# Patient Record
Sex: FEMALE | Race: WHITE | NOT HISPANIC OR LATINO | Employment: FULL TIME | URBAN - METROPOLITAN AREA
[De-identification: names, ages, dates, MRNs, and addresses within clinical notes are randomized per-mention and may not be internally consistent; named-entity substitution may affect disease eponyms.]

---

## 2017-11-28 ENCOUNTER — ALLSCRIPTS OFFICE VISIT (OUTPATIENT)
Dept: OTHER | Facility: OTHER | Age: 32
End: 2017-11-28

## 2017-11-28 DIAGNOSIS — Z13.6 ENCOUNTER FOR SCREENING FOR CARDIOVASCULAR DISORDERS: ICD-10-CM

## 2017-11-28 DIAGNOSIS — Z13.29 ENCOUNTER FOR SCREENING FOR OTHER SUSPECTED ENDOCRINE DISORDER: ICD-10-CM

## 2017-11-28 DIAGNOSIS — Z13.228 ENCOUNTER FOR SCREENING FOR OTHER METABOLIC DISORDERS: ICD-10-CM

## 2017-11-28 DIAGNOSIS — Z13.0 ENCOUNTER FOR SCREENING FOR DISEASES OF THE BLOOD AND BLOOD-FORMING ORGANS AND CERTAIN DISORDERS INVOLVING THE IMMUNE MECHANISM: ICD-10-CM

## 2017-11-30 ENCOUNTER — GENERIC CONVERSION - ENCOUNTER (OUTPATIENT)
Dept: OTHER | Facility: OTHER | Age: 32
End: 2017-11-30

## 2017-11-30 ENCOUNTER — LAB CONVERSION - ENCOUNTER (OUTPATIENT)
Dept: OTHER | Facility: OTHER | Age: 32
End: 2017-11-30

## 2017-11-30 LAB
A/G RATIO (HISTORICAL): 1.6 (CALC) (ref 1–2.5)
ALBUMIN SERPL BCP-MCNC: 4.2 G/DL (ref 3.6–5.1)
ALP SERPL-CCNC: 94 U/L (ref 33–115)
ALT SERPL W P-5'-P-CCNC: 16 U/L (ref 6–29)
AST SERPL W P-5'-P-CCNC: 17 U/L (ref 10–30)
BILIRUB SERPL-MCNC: 0.4 MG/DL (ref 0.2–1.2)
BUN SERPL-MCNC: 11 MG/DL (ref 7–25)
BUN/CREA RATIO (HISTORICAL): ABNORMAL (CALC) (ref 6–22)
CALCIUM SERPL-MCNC: 9.1 MG/DL (ref 8.6–10.2)
CHLORIDE SERPL-SCNC: 100 MMOL/L (ref 98–110)
CHOLEST SERPL-MCNC: 202 MG/DL
CHOLEST/HDLC SERPL: 2.9 (CALC)
CO2 SERPL-SCNC: 27 MMOL/L (ref 20–31)
CREAT SERPL-MCNC: 0.76 MG/DL (ref 0.5–1.1)
DEPRECATED RDW RBC AUTO: 13.3 % (ref 11–15)
EGFR AFRICAN AMERICAN (HISTORICAL): 119 ML/MIN/1.73M2
EGFR-AMERICAN CALC (HISTORICAL): 102 ML/MIN/1.73M2
GAMMA GLOBULIN (HISTORICAL): 2.7 G/DL (CALC) (ref 1.9–3.7)
GLUCOSE (HISTORICAL): 94 MG/DL (ref 65–99)
HCT VFR BLD AUTO: 40.3 % (ref 35–45)
HDLC SERPL-MCNC: 70 MG/DL
HGB BLD-MCNC: 12.9 G/DL (ref 11.7–15.5)
LDL CHOLESTEROL (HISTORICAL): 105 MG/DL (CALC)
MCH RBC QN AUTO: 26 PG (ref 27–33)
MCHC RBC AUTO-ENTMCNC: 32 G/DL (ref 32–36)
MCV RBC AUTO: 81.1 FL (ref 80–100)
NON-HDL-CHOL (CHOL-HDL) (HISTORICAL): 132 MG/DL (CALC)
PLATELET # BLD AUTO: 331 THOUSAND/UL (ref 140–400)
PMV BLD AUTO: 10.3 FL (ref 7.5–12.5)
POTASSIUM SERPL-SCNC: 4 MMOL/L (ref 3.5–5.3)
RBC # BLD AUTO: 4.97 MILLION/UL (ref 3.8–5.1)
SODIUM SERPL-SCNC: 134 MMOL/L (ref 135–146)
TOTAL PROTEIN (HISTORICAL): 6.9 G/DL (ref 6.1–8.1)
TRIGL SERPL-MCNC: 154 MG/DL
TSH SERPL DL<=0.05 MIU/L-ACNC: 2.44 MIU/L
WBC # BLD AUTO: 6.6 THOUSAND/UL (ref 3.8–10.8)

## 2018-01-11 NOTE — PROGRESS NOTES
Assessment    1  Encounter for preventive health examination (V70 0) (Z00 00)   2  Obesity (278 00) (E66 9)    Plan  Obesity    · Begin or continue regular aerobic exercise  Gradually work up to at least 3 sessions of 30  minutes of exercise a week ; Status:Complete;   Done: 26EZQ8045   · Guidelines for a vegan diet ; Status:Complete;   Done: 61PHH6602   · How to eat a high-protein vegetarian diet ; Status:Complete;   Done: 97EBV9131   · We recommend that you bring your body mass index down to 26 ; Status:Complete;    Done: 07ONK7479    Discussion/Summary  health maintenance visit healthy adult female Currently, she eats an adequate diet and has an inadequate exercise regimen  cervical cancer screening is current Breast cancer screening: breast cancer screening is not indicated  Colorectal cancer screening: colorectal cancer screening is not indicated  Osteoporosis screening: bone mineral density testing is not indicated  The immunizations are up to date  Advice and education were given regarding nutrition, aerobic exercise and weight loss  Work PE form filled out  Discussed weight loss  Encouraged pt to schedule appointment to discuss further once she is finished with school  Chief Complaint  Pt is here for PE      History of Present Illness  HM, Adult Female: The patient is being seen for a health maintenance evaluation  Social History: Household members include domestic partner  She is unmarried  Work status: working full time and occupation:   General Health: The patient's health since the last visit is described as good  She has regular dental visits  She complains of vision problems  Vision care includes wearing glasses and an eye examination more than a year ago  She denies hearing loss  Immunizations status: up to date  Lifestyle:  She consumes a diverse and healthy diet  She has weight concerns  She does not exercise regularly   She does not use tobacco  She consumes alcohol  She reports occasional alcohol use  She denies drug use  Reproductive health: the patient is premenopausal    Screening: Cervical cancer screening includes a pap smear performed last year  Breast cancer screening includes no previous clinical breast exam  She hasn't been previously screened for colorectal cancer  Metabolic screening includes lipid profile performed 2015, glucose screening performed 2015 and thyroid function test performed 2015  Cardiovascular risk factors: stress, obesity and sedentary lifestyle  Risk findings: anxiety symptoms and depression symptoms, but no travel to developing areas and no tuberculosis exposure  HPI: Has difficulty losing weight  Has been to nutritionist w/o effect  Keeps track of dietary intake on phone  Does not exercise  Works full time as  and in school for [employer]  Done in May  Under a lot of stress  Does tend to stress eat  Mom with obesity  Review of Systems    Constitutional: no fever, not feeling poorly, no chills and not feeling tired  Eyes: No complaints of eye pain, no red eyes, no eyesight problems, no discharge, no dry eyes, no itching of eyes  ENT: no complaints of earache, no loss of hearing, no nose bleeds, no nasal discharge, no sore throat, no hoarseness  Cardiovascular: no chest pain, no palpitations and no lower extremity edema  Respiratory: no shortness of breath, no cough and no wheezing  Gastrointestinal: No complaints of abdominal pain, no constipation, no nausea or vomiting, no diarrhea, no bloody stools  Genitourinary: No complaints of dysuria, no incontinence, no pelvic pain, no dysmenorrhea, no vaginal discharge or bleeding  Musculoskeletal: No complaints of arthralgias, no myalgias, no joint swelling or stiffness, no limb pain or swelling  Integumentary: no rashes and no skin lesions  Neurological: no headache, no numbness, no tingling and no dizziness     Psychiatric: anxiety and depression, but no sleep disturbances  Hematologic/Lymphatic: No complaints of swollen glands, no swollen glands in the neck, does not bleed easily, does not bruise easily  Active Problems    1  Allergic rhinitis (477 9) (J30 9)   2  Chronic pain syndrome (338 4) (G89 4)   3  Depression (311) (F32 9)   4  Elevated blood pressure reading without diagnosis of hypertension (796 2) (R03 0)   5  Exercise-induced asthma (493 81) (J45 990)   6  Flu vaccine need (V04 81) (Z23)   7  Generalized anxiety disorder (300 02) (F41 1)   8  Limb pain (729 5) (M79 609)   9  Lower back pain (724 2) (M54 5)   10  Lumbar radiculopathy (724 4) (M54 16)   11  Lumbosacral spondylosis without myelopathy (721 3) (M47 817)   12  Obesity (278 00) (E66 9)    Past Medical History    · History of Facial numbness (782 0) (R20 0)   · History of Herniated lumbar intervertebral disc (722 10) (M51 26)   · History of upper respiratory infection (V12 09) (Z87 09)   · History of URTI (acute upper respiratory infection) (465 9) (J06 9)    Surgical History    · History of Oral Surgery Tooth Extraction   · History of Right Breast Lumpectomy    Family History  Family History    · Family history of Ovarian Cancer (V16 41)    Social History    · Being A Social Drinker   · Never A Smoker    Current Meds   1  Allegra Allergy 180 MG Oral Tablet; take 1 tablet daily as needed; Therapy: 21MRC2100 to Recorded   2  B-12 1000 MCG Oral Capsule; 1 cap a day; Therapy: 99WZA2495 to Recorded   3  Fish Oil 1200 MG Oral Capsule; one tab dialy; Therapy: 68QRF9445 to Recorded   4  Montelukast Sodium 10 MG Oral Tablet; TAKE ONE (1) TABLET(S) DAILY; Therapy: 93REB5075 to (Hettie Coil)  Requested for: 61Dbn9560; Last   Rx:88Ocf4574 Ordered   5  NuvaRing 0 12-0 015 MG/24HR Vaginal Ring; Therapy: 53GBR7050 to Recorded   6  AJ-e 200 MG Oral Tablet; TAKE AS DIRECTED; Therapy: 89LCJ0224 to Recorded   7   Triamcinolone Acetonide 55 MCG/ACT Nasal Aerosol; 2 SPRAYS EACH NOSTRIL   ONCE DAILY; Therapy: 54IZO0675 to Recorded   8  Ventolin  (90 Base) MCG/ACT Inhalation Aerosol Solution; INHALE 1 TO 2   PUFFS EVERY 4 TO 6 HOURS AS NEEDED; Therapy: 32HFJ6278 to Recorded    Allergies    1  Anesthesia IV Set MISC   2  Opioid Analgesics   3  Percocet TABS    Vitals   Recorded: 29Nov2016 10:09AM Recorded: 93RCZ5370 09:47AM   Temperature  98 3 F, Tympanic   Heart Rate  96, L Radial   Pulse Quality  Regular   Systolic 261 352, LUE, Sitting   Diastolic 88 90, LUE, Sitting   Height  5 ft 6 in   Weight  228 lb    BMI Calculated  36 8   BSA Calculated  2 12     Physical Exam    Constitutional   General appearance: No acute distress, well appearing and well nourished  obese  Head and Face   Head and face: Normal     Eyes   Conjunctiva and lids: No swelling, erythema or discharge  Pupils and irises: Equal, round, reactive to light  Ears, Nose, Mouth, and Throat   External inspection of ears and nose: Normal     Otoscopic examination: Tympanic membranes translucent with normal light reflex  Canals patent without erythema  Lips, teeth, and gums: Normal, good dentition  Oropharynx: Normal with no erythema, edema, exudate or lesions  Neck   Neck: Supple, symmetric, trachea midline, no masses  Thyroid: Normal, no thyromegaly  Pulmonary   Respiratory effort: No increased work of breathing or signs of respiratory distress  Auscultation of lungs: Clear to auscultation  Cardiovascular   Auscultation of heart: Normal rate and rhythm, normal S1 and S2, no murmurs  Examination of extremities for edema and/or varicosities: Normal     Abdomen   Abdomen: Non-tender, no masses  Liver and spleen: No hepatomegaly or splenomegaly  Lymphatic   Palpation of lymph nodes in neck: No lymphadenopathy  Musculoskeletal   Gait and station: Normal     Muscle strength/tone: Normal     Skin   Skin and subcutaneous tissue: Normal without rashes or lesions  Palpation of skin and subcutaneous tissue: Normal turgor  Neurologic   Reflexes: 2+ and symmetric  Psychiatric   Orientation to person, place, and time: Normal     Mood and affect: Normal        Results/Data  PHQ-9 Adult Depression Screening 29Nov2016 09:42AM User, s     Test Name Result Flag Reference   PHQ-9 Adult Depression Score 3     Over the last two weeks, how often have you been bothered by any of the following problems? Little interest or pleasure in doing things: Not at all - 0  Feeling down, depressed, or hopeless: Several days - 1  Trouble falling or staying asleep, or sleeping too much: Several days - 1  Feeling tired or having little energy: Not at all - 0  Poor appetite or over eating: Several days - 1  Feeling bad about yourself - or that you are a failure or have let yourself or your family down: Not at all - 0  Trouble concentrating on things, such as reading the newspaper or watching television: Not at all - 0  Moving or speaking so slowly that other people could have noticed   Or the opposite -  being so fidgety or restless that you have been moving around a lot more than usual: Not at all - 0  Thoughts that you would be better off dead, or of hurting yourself in some way: Not at all - 0   PHQ-9 Adult Depression Screening Negative     PHQ-9 Difficulty Level Not difficult at all     PHQ-9 Severity Minimal Depression         Signatures   Electronically signed by : Wily Onofre; Nov 29 2016 10:18AM EST                       (Author)    Electronically signed by : Papo Cho DO; Nov 29 2016 10:29AM EST                       (Author)

## 2018-01-11 NOTE — RESULT NOTES
Discussion/Summary    Please call pt and let her know I completed her form and will be faxing  She can  the original    Her blood work was normal except her triglycerides were slightly elevated  I recommend reducing carbs and sugar in diet  Will recheck in 1 year  Will give her slips at her next PE in 1 year  Pt aware         Verified Results  (1) LIPID PANEL, FASTING [id number] 07:23AM Yael Burnham     Test Name Result Flag Reference   CHOLESTEROL, TOTAL 202 mg/dL H <200   HDL CHOLESTEROL 70 mg/dL  >28   TRIGLICERIDES 981 mg/dL H <150   LDL-CHOLESTEROL 105 mg/dL (calc) H    Reference range: <100     Desirable range <100 mg/dL for patients with CHD or  diabetes and <70 mg/dL for diabetic patients with  known heart disease  LDL-C is now calculated using the Lionel-Ley   calculation, which is a validated novel method providing   better accuracy than the Friedewald equation in the   estimation of LDL-C  Kenny Ac  [place] [address];464(01): [phone]   (http://[employer]/faq/VMD015)   CHOL/HDLC RATIO 2 9 (calc)  <5 0   NON HDL CHOLESTEROL 132 mg/dL (calc) H <130   For patients with diabetes plus 1 major ASCVD risk   factor, treating to a non-HDL-C goal of <100 mg/dL   (LDL-C of <70 mg/dL) is considered a therapeutic   option  (1) COMPREHENSIVE METABOLIC PANEL 16KDV5349 14:06QX Ana Rosa Carlson     Test Name Result Flag Reference   GLUCOSE 94 mg/dL  65-99   Fasting reference interval   UREA NITROGEN (BUN) 11 mg/dL  7-25   CREATININE 0 76 mg/dL  0 50-1 10   eGFR NON-AFR   AMERICAN 102 mL/min/1 73m2  > OR = 60   eGFR AFRICAN AMERICAN 119 mL/min/1 73m2  > OR = 60   BUN/CREATININE RATIO   7-14   NOT APPLICABLE (calc)   SODIUM 134 mmol/L L 135-146   POTASSIUM 4 0 mmol/L  3 5-5 3   CHLORIDE 100 mmol/L     CARBON DIOXIDE 27 mmol/L  20-31   CALCIUM 9 1 mg/dL  8 6-10 2   PROTEIN, TOTAL 6 9 g/dL  6 1-8 1   ALBUMIN 4 2 g/dL  3 6-5 1   GLOBULIN 2 7 g/dL (calc)  1 9-3 7   ALBUMIN/GLOBULIN RATIO 1 6 (calc)  1 0-2 5   BILIRUBIN, TOTAL 0 4 mg/dL  0 2-1 2   ALKALINE PHOSPHATASE 94 U/L     AST 17 U/L  10-30   ALT 16 U/L  6-29     (Q) CBC (H/H, RBC, INDICES, WBC, PLT) [id number] 07:23AM Yael Burnham     Test Name Result Flag Reference   WHITE BLOOD CELL COUNT 6 6 Thousand/uL  3 8-10 8   RED BLOOD CELL COUNT 4 97 Million/uL  3 80-5 10   HEMOGLOBIN 12 9 g/dL  11 7-15 5   HEMATOCRIT 40 3 %  35 0-45 0   MCV 81 1 fL  80 0-100 0   MCH 26 0 pg L 27 0-33 0   MCHC 32 0 g/dL  32 0-36 0   RDW 13 3 %  11 0-15 0   PLATELET COUNT 121 Thousand/uL  140-400   MPV 10 3 fL  7 5-12 5     (Q) TSH, 3RD GENERATION W/REFLEX TO FT4 [id number] 07:23AM Durga Epstein   REPORT COMMENT:  FASTING:YES     Test Name Result Flag Reference   TSH W/REFLEX TO FT4 2 44 mIU/L     Reference Range                         > or = 20 Years  0 40-4 50                              Pregnancy Ranges            First trimester    0 26-2 66            Second trimester   0 55-2 73            Third trimester    0 43-2 91

## 2018-01-16 NOTE — PROGRESS NOTES
Assessment    1  Encounter for preventive health examination (V70 0) (Z00 00)   2  Obesity (278 00) (E66 9)   3  Exercise-induced asthma (493 81) (J45 990)    Plan  Exercise-induced asthma    · Ventolin  (90 Base) MCG/ACT Inhalation Aerosol Solution; INHALE 1 TO  2 PUFFS EVERY 4 TO 6 HOURS AS NEEDED  Health Maintenance    · Follow-up visit in 1 year Evaluation and Treatment  Follow-up  Status: Hold For -  Scheduling  Requested for: 11AKY2607   · Brush your teeth 3 times a day and floss at least once a day ; Status:Complete;   Done:  51OAV3743   · Regular aerobic exercise can help reduce stress ; Status:Complete;   Done: 48MXR0483   · Use a sun block product with an SPF of 15 or more ; Status:Complete;   Done:  46YCY9462   · Vitamins can help you get daily requirements that your diet may not be giving you ;  Status:Complete;   Done: 68EQS3000   · We recommend routine visits to a dentist ; Status:Complete;   Done: 44ONV4032   · Call [phone] if: You find a new or different kind of lump in your breast ;  Status:Complete;   Done: 20XQX2049   · Call [phone] if: You have any warning signs of skin cancer ; Status:Complete;    Done: 99QOT5555   · Call [phone] if: You have symptoms of toxic shock ; Status:Complete;   Done: 59MZS1533  Obesity    · Avoid alcoholic beverages ; Status:Complete;   Done: 92MJS7158   · Eat a low fat and low cholesterol diet ; Status:Complete;   Done: 60AEI2802   · How to eat a high-protein vegetarian diet ; Status:Complete;   Done: 85NVG0037   · Some eating tips that can help you lose weight ; Status:Complete;   Done: 56HKX3751   · We recommend that you bring your body mass index down to 26 ; Status:Complete;    Done: 83OYF7902  Screening for cardiovascular condition    · (1) LIPID PANEL, FASTING; Status:Active; Requested for:28Nov2017;   Screening for endocrine/metabolic/immunity disorders    · (1) CBC/ PLT (NO DIFF); Status:Active;  Requested for:28Nov2017;    · (1) COMPREHENSIVE METABOLIC PANEL; Status:Active; Requested for:28Nov2017;    · (1) TSH WITH FT4 REFLEX; Status:Active; Requested for:28Nov2017;     Discussion/Summary  health maintenance visit healthy adult female Currently, she eats an adequate diet and has an adequate exercise regimen  cervical cancer screening is current Breast cancer screening: breast cancer screening is not indicated  Colorectal cancer screening: colorectal cancer screening is not indicated  Osteoporosis screening: bone mineral density testing is not indicated  Screening lab work includes hemoglobin, glucose, lipid profile and thyroid function testing  The immunizations are up to date and had flu shot at work  Advice and education were given regarding nutrition, aerobic exercise and weight loss  Patient discussion: discussed with the patient  EIA well controlled  Refill for Ventolin inhaler sent  Difficulty losing weight  Will check TSH  Will fill out work PE form once lab results available  The treatment plan was reviewed with the patient/guardian  The patient/guardian understands and agrees with the treatment plan      Chief Complaint  Pt is here for PE      History of Present Illness  HM, Adult Female: The patient is being seen for a health maintenance evaluation  The last health maintenance visit was 1 year(s) ago  General Health: The patient's health since the last visit is described as good  She has regular dental visits  She complains of vision problems  Vision care includes wearing glasses and an eye examination within the last year  She denies hearing loss  Immunizations status: up to date  Lifestyle:  She consumes a diverse and healthy diet  She has weight concerns  She exercises regularly  She does not use tobacco  She consumes alcohol  She reports occasional alcohol use  She denies drug use  Reproductive health: the patient is premenopausal    Screening: Cervical cancer screening includes a pap smear performed 2014   Breast cancer screening includes a mammogram performed 1997  She hasn't been previously screened for colorectal cancer  Metabolic screening includes lipid profile performed 2015, glucose screening performed 2015 and thyroid function test performed 2015  Cardiovascular risk factors: obesity  Risk findings: no anxiety symptoms, no depression symptoms, no travel to developing areas and no tuberculosis exposure  HPI: Keeps gaining weight  Runs 10-15 miles/week  Lifts weight  Does couch to 5K  Did Weight Watchers and it did not work for her  Tends to carry water weight  Watches sodium  Vegetarian  Exercise induced asthma  using Ventolin inhaler prior to running  No need outside of that  States depression and anxiety are so much better  has not needed meds for years  Review of Systems    Constitutional: recent weight gain, but no fever, not feeling poorly, no chills and not feeling tired  Eyes: No complaints of eye pain, no red eyes, no eyesight problems, no discharge, no dry eyes, no itching of eyes  ENT: no complaints of earache, no loss of hearing, no nose bleeds, no nasal discharge, no sore throat, no hoarseness  Cardiovascular: no chest pain, no palpitations and no lower extremity edema  Respiratory: no shortness of breath, no cough and no wheezing  Gastrointestinal: No complaints of abdominal pain, no constipation, no nausea or vomiting, no diarrhea, no bloody stools  Genitourinary: No complaints of dysuria, no incontinence, no pelvic pain, no dysmenorrhea, no vaginal discharge or bleeding  Musculoskeletal: No complaints of arthralgias, no myalgias, no joint swelling or stiffness, no limb pain or swelling  Integumentary: no rashes and no skin lesions  Neurological: no headache, no numbness, no tingling, no dizziness, no limb weakness and no difficulty walking  Psychiatric: no anxiety, no sleep disturbances and no depression     Hematologic/Lymphatic: No complaints of swollen glands, no swollen glands in the neck, does not bleed easily, does not bruise easily  Active Problems    1  Allergic rhinitis (477 9) (J30 9)   2  Exercise-induced asthma (493 81) (J45 990)   3  Flu vaccine need (V04 81) (Z23)   4  Obesity (278 00) (E66 9)    Past Medical History    · History of Chronic pain syndrome (338 4) (G89 4)   · History of Elevated blood pressure reading without diagnosis of hypertension (796 2)  (R03 0)   · History of Facial numbness (782 0) (R20 0)   · History of Generalized anxiety disorder (300 02) (F41 1)   · History of Herniated lumbar intervertebral disc (722 10) (M51 26)   · History of depression (V11 8) (Z86 59)   · History of low back pain (V13 59) (Z87 39)   · History of upper respiratory infection (V12 09) (Z87 09)   · History of Limb pain (729 5) (M79 609)   · History of Lumbar radiculopathy (724 4) (M54 16)   · History of Lumbosacral spondylosis without myelopathy (721 3) (M47 817)   · History of URTI (acute upper respiratory infection) (465 9) (J06 9)    Surgical History    · History of Oral Surgery Tooth Extraction   · History of Right Breast Lumpectomy    Family History  Family History    · Family history of Ovarian Cancer (V16 41)    Social History    · Being A Social Drinker   · Never A Smoker    Current Meds   1  Allegra Allergy 180 MG Oral Tablet; take 1 tablet daily as needed; Therapy: 93XEC5422 to Recorded   2  B-12 1000 MCG Oral Capsule; 1 cap a day; Therapy: 83YDS8298 to Recorded   3  Fish Oil 1200 MG Oral Capsule; one tab dialy; Therapy: 37KXB3532 to Recorded   4  Triamcinolone Acetonide 55 MCG/ACT Nasal Aerosol; 2 SPRAYS EACH NOSTRIL   ONCE DAILY; Therapy: 95NZT0725 to Recorded   5  Ventolin  (90 Base) MCG/ACT Inhalation Aerosol Solution; INHALE 1 TO 2   PUFFS EVERY 4 TO 6 HOURS AS NEEDED; Therapy: 91LKN3127 to Recorded    Allergies    1  Anesthesia IV Set MISC   2  Opioid Analgesics   3   Percocet TABS    Vitals   Recorded: [id number] 08:02AM Temperature 97 9 F, Tympanic   Heart Rate 88, L Radial   Pulse Quality Regular, L Radial   Systolic 948, LUE, Sitting   Diastolic 82, LUE, Sitting   Height 5 ft 6 in   Weight 234 lb 6 4 oz   BMI Calculated 37 83   BSA Calculated 2 14     Physical Exam    Constitutional   General appearance: Abnormal   obese  Head and Face   Head and face: Normal     Eyes   Conjunctiva and lids: No swelling, erythema or discharge  Pupils and irises: Equal, round, reactive to light  Ears, Nose, Mouth, and Throat   External inspection of ears and nose: Normal     Otoscopic examination: Tympanic membranes translucent with normal light reflex  Canals patent without erythema  Lips, teeth, and gums: Normal, good dentition  Oropharynx: Normal with no erythema, edema, exudate or lesions  Neck   Neck: Supple, symmetric, trachea midline, no masses  Thyroid: Normal, no thyromegaly  Pulmonary   Respiratory effort: No increased work of breathing or signs of respiratory distress  Auscultation of lungs: Clear to auscultation  Cardiovascular   Auscultation of heart: Normal rate and rhythm, normal S1 and S2, no murmurs  Carotid pulses: 2+ bilaterally  no bruit heard over the right carotid and no bruit heard over the left carotid  Examination of extremities for edema and/or varicosities: Normal     Abdomen   Abdomen: Non-tender, no masses  Liver and spleen: No hepatomegaly or splenomegaly  Lymphatic   Palpation of lymph nodes in neck: No lymphadenopathy  Musculoskeletal   Gait and station: Normal     Muscle strength/tone: Normal     Skin   Skin and subcutaneous tissue: Normal without rashes or lesions  Palpation of skin and subcutaneous tissue: Normal turgor  Neurologic   Cranial nerves: Cranial nerves II-XII intact  Reflexes: 2+ and symmetric      Psychiatric   Orientation to person, place, and time: Normal     Mood and affect: Normal        Results/Data  PHQ-9 Adult Depression Screening [id number] 08: 15AM User, [facility]     Test Name Result Flag Reference   PHQ-9 Adult Depression Score 0     Over the last two weeks, how often have you been bothered by any of the following problems? Little interest or pleasure in doing things: Not at all - 0  Feeling down, depressed, or hopeless: Not at all - 0  Trouble falling or staying asleep, or sleeping too much: Not at all - 0  Feeling tired or having little energy: Not at all - 0  Poor appetite or over eating: Not at all - 0  Feeling bad about yourself - or that you are a failure or have let yourself or your family down: Not at all - 0  Trouble concentrating on things, such as reading the newspaper or watching television: Not at all - 0  Moving or speaking so slowly that other people could have noticed   Or the opposite -  being so fidgety or restless that you have been moving around a lot more than usual: Not at all - 0  Thoughts that you would be better off dead, or of hurting yourself in some way: Not at all - 0   PHQ-9 Adult Depression Screening Negative     PHQ-9 Difficulty Level Not difficult at all     PHQ-9 Severity No Depression         Signatures   Electronically signed by : David Corado; Nov 28 2017  8:46AM EST                       (Author)    Electronically signed by : Shirley Locke DO; Nov 28 2017  8:51AM EST                       (Author)

## 2018-01-22 VITALS
DIASTOLIC BLOOD PRESSURE: 82 MMHG | WEIGHT: 234.4 LBS | HEIGHT: 66 IN | HEART RATE: 88 BPM | TEMPERATURE: 97.9 F | SYSTOLIC BLOOD PRESSURE: 132 MMHG | BODY MASS INDEX: 37.67 KG/M2

## 2018-04-10 ENCOUNTER — TELEPHONE (OUTPATIENT)
Dept: FAMILY MEDICINE CLINIC | Facility: HOSPITAL | Age: 33
End: 2018-04-10

## 2018-04-10 RX ORDER — PYRIDOXINE HCL (VITAMIN B6) 50 MG
TABLET ORAL
COMMUNITY
Start: 2013-11-25 | End: 2018-04-11 | Stop reason: SDUPTHER

## 2018-04-10 RX ORDER — ALBUTEROL SULFATE 90 UG/1
1-2 AEROSOL, METERED RESPIRATORY (INHALATION)
COMMUNITY
Start: 2013-11-25 | End: 2018-11-06 | Stop reason: SDUPTHER

## 2018-04-10 RX ORDER — TRIAMCINOLONE ACETONIDE 55 UG/1
2 SPRAY, METERED NASAL DAILY
COMMUNITY
Start: 2015-09-08 | End: 2018-05-15 | Stop reason: ALTCHOICE

## 2018-04-10 RX ORDER — AMOXICILLIN 500 MG
CAPSULE ORAL
COMMUNITY
Start: 2013-11-25 | End: 2018-05-15 | Stop reason: ALTCHOICE

## 2018-04-10 RX ORDER — FEXOFENADINE HCL 180 MG/1
1 TABLET ORAL DAILY PRN
COMMUNITY
Start: 2015-06-04 | End: 2018-05-15 | Stop reason: ALTCHOICE

## 2018-04-10 NOTE — TELEPHONE ENCOUNTER
PATIENT LEFT MESSAGE ON OUR VOICEMAIL - SHE IS PREGNANT BUT WANTS TO SEE A PSYCH DR BECAUSE SHE IS DEPRESSED - CAN NOT GET IN TO SEE PSYCH DR FOR A FEW MONTHS AND WAS HOPING THAT WE COULD PRESCRIBE AN ANTIDEPRESSANT FOR HER IN THE MEANTIME - PCB

## 2018-04-11 ENCOUNTER — OFFICE VISIT (OUTPATIENT)
Dept: FAMILY MEDICINE CLINIC | Facility: HOSPITAL | Age: 33
End: 2018-04-11
Payer: COMMERCIAL

## 2018-04-11 VITALS
TEMPERATURE: 98.6 F | WEIGHT: 235.8 LBS | BODY MASS INDEX: 37.9 KG/M2 | DIASTOLIC BLOOD PRESSURE: 80 MMHG | HEIGHT: 66 IN | SYSTOLIC BLOOD PRESSURE: 136 MMHG | HEART RATE: 94 BPM

## 2018-04-11 DIAGNOSIS — F33.0 MILD EPISODE OF RECURRENT MAJOR DEPRESSIVE DISORDER (HCC): Primary | ICD-10-CM

## 2018-04-11 DIAGNOSIS — Z3A.01 LESS THAN 8 WEEKS GESTATION OF PREGNANCY: ICD-10-CM

## 2018-04-11 PROCEDURE — 99214 OFFICE O/P EST MOD 30 MIN: CPT | Performed by: NURSE PRACTITIONER

## 2018-04-11 RX ORDER — LORATADINE 10 MG/1
CAPSULE, LIQUID FILLED ORAL
COMMUNITY
Start: 2018-03-19 | End: 2020-03-27

## 2018-04-11 RX ORDER — CHOLECALCIFEROL (VITAMIN D3) 25 MCG
TABLET,CHEWABLE ORAL
COMMUNITY
Start: 2018-01-03 | End: 2022-07-08

## 2018-04-11 RX ORDER — SERTRALINE HYDROCHLORIDE 25 MG/1
TABLET, FILM COATED ORAL
Qty: 30 TABLET | Refills: 1 | Status: SHIPPED | OUTPATIENT
Start: 2018-04-11 | End: 2018-05-15 | Stop reason: SDUPTHER

## 2018-04-11 NOTE — ASSESSMENT & PLAN NOTE
Recurrent depression complicated by pregnancy  Discussed risk/benefit to SSRI use in pregnancy  Agreeable starting medication outweighs risks  Will start on sertraline  Advise she discuss with OB before starting  Continue with therapy  F/U in 4 weeks  Call with any worsening mood

## 2018-04-11 NOTE — PROGRESS NOTES
Assessment/Plan:    Mild episode of recurrent major depressive disorder (HCC)  Recurrent depression complicated by pregnancy  Discussed risk/benefit to SSRI use in pregnancy  Agreeable starting medication outweighs risks  Will start on sertraline  Advise she discuss with OB before starting  Continue with therapy  F/U in 4 weeks  Call with any worsening mood  Diagnoses and all orders for this visit:    Mild episode of recurrent major depressive disorder (HCC)  -     sertraline (ZOLOFT) 25 mg tablet; Take 1/2 tab daily for 1 week then increase to 1 tablet daily  Less than 8 weeks gestation of pregnancy    Other orders  -     Loratadine (CLARITIN) 10 MG CAPS;   -     Fluticasone Propionate (FLONASE ALLERGY RELIEF NA);   -     Prenatal Vit-Fe Fum-FA-Omega (ONE-A-DAY WOMENS PRENATAL PO);   -     Cyanocobalamin (B-12) 1000 MCG CAPS;           Subjective:      Patient ID: Emily Limon is a 27 y o  female  5 weeks pregnant  Feeling depressed for last 4 months  Was taking AJ-E supplement but stopped when she found out she was pregnant  Meloncholic-very sad  Has some anxiety  Was doing well with supplement  Lack of motivation  Negative self talk  Going to therapy  Using coping skills  Has been on both sertraline and Lexapro in the past  States both were effective but caused SI after being on for 1 year  Had also been on wellbutrin with good effect but made her hyped up initially  Currently denies any Si  Pregnancy was planned  States she did not want children but  does  States she compromised with him as he will be main caretaker once baby born  The following portions of the patient's history were reviewed and updated as appropriate: allergies, current medications, past family history, past medical history, past social history, past surgical history and problem list     Review of Systems   Constitutional: Negative for unexpected weight change     Psychiatric/Behavioral: Positive for dysphoric mood and sleep disturbance  Negative for suicidal ideas  The patient is nervous/anxious  Objective:  Vitals:    04/11/18 0742   BP: 136/80   Pulse: 94   Temp: 98 6 °F (37 °C)      Physical Exam   Constitutional: She is oriented to person, place, and time  She appears well-developed and well-nourished  Neurological: She is alert and oriented to person, place, and time  Skin: Skin is warm and dry  Psychiatric: She has a normal mood and affect   Her behavior is normal  Judgment and thought content normal

## 2018-05-15 ENCOUNTER — OFFICE VISIT (OUTPATIENT)
Dept: FAMILY MEDICINE CLINIC | Facility: HOSPITAL | Age: 33
End: 2018-05-15
Payer: COMMERCIAL

## 2018-05-15 VITALS
SYSTOLIC BLOOD PRESSURE: 126 MMHG | TEMPERATURE: 98.5 F | WEIGHT: 236.2 LBS | BODY MASS INDEX: 37.96 KG/M2 | HEART RATE: 90 BPM | HEIGHT: 66 IN | DIASTOLIC BLOOD PRESSURE: 84 MMHG

## 2018-05-15 DIAGNOSIS — F33.0 MILD EPISODE OF RECURRENT MAJOR DEPRESSIVE DISORDER (HCC): Primary | ICD-10-CM

## 2018-05-15 PROCEDURE — 99213 OFFICE O/P EST LOW 20 MIN: CPT | Performed by: NURSE PRACTITIONER

## 2018-05-15 RX ORDER — SERTRALINE HYDROCHLORIDE 25 MG/1
TABLET, FILM COATED ORAL
Qty: 30 TABLET | Refills: 1 | Status: SHIPPED | OUTPATIENT
Start: 2018-05-15 | End: 2018-07-17 | Stop reason: SDUPTHER

## 2018-05-15 NOTE — ASSESSMENT & PLAN NOTE
Well controlled  Continue with therapy  Good use of coping skills  Will continue on current regimen  F/U in 2 months

## 2018-05-15 NOTE — PROGRESS NOTES
Assessment/Plan:    Mild episode of recurrent major depressive disorder ([address] )  Well controlled  Continue with therapy  Good use of coping skills  Will continue on current regimen  F/U in 2 months  Diagnoses and all orders for this visit:    Mild episode of recurrent major depressive disorder (HCC)  -     sertraline (ZOLOFT) 25 mg tablet; Take 1/2 tab daily for 1 week then increase to 1 tablet daily  Subjective:      Patient ID: Hollie Irwin is a 27 y o  female  Mood and affect are better  Has been using coping skills  In therapy  No anxiety  Not sleeping but feels it is hormonal but getting better  Denies AE  Considering coming off sertraline for third trimester  Plans on utilizing coping skills and therpy to fullest extent in order to achieve this  The following portions of the patient's history were reviewed and updated as appropriate: allergies, current medications, past family history, past medical history, past social history, past surgical history and problem list     Review of Systems   Constitutional: Negative for activity change, fatigue and unexpected weight change  Gastrointestinal: Positive for vomiting  Negative for abdominal pain and nausea  Neurological: Negative for headaches  Psychiatric/Behavioral: Positive for sleep disturbance  Negative for agitation, decreased concentration, dysphoric mood and suicidal ideas  The patient is not nervous/anxious and is not hyperactive  Objective:  Vitals:    05/15/18 1709   BP: 126/84   Pulse: 90   Temp: 98 5 °F (36 9 °C)      Physical Exam   Constitutional: She is oriented to person, place, and time  She appears well-developed and well-nourished  Neurological: She is alert and oriented to person, place, and time  Skin: Skin is warm and dry  Psychiatric: She has a normal mood and affect   Her behavior is normal  Judgment and thought content normal

## 2018-07-17 ENCOUNTER — OFFICE VISIT (OUTPATIENT)
Dept: FAMILY MEDICINE CLINIC | Facility: HOSPITAL | Age: 33
End: 2018-07-17
Payer: COMMERCIAL

## 2018-07-17 VITALS
HEIGHT: 66 IN | TEMPERATURE: 97.9 F | SYSTOLIC BLOOD PRESSURE: 132 MMHG | WEIGHT: 244.4 LBS | DIASTOLIC BLOOD PRESSURE: 80 MMHG | BODY MASS INDEX: 39.28 KG/M2 | HEART RATE: 86 BPM

## 2018-07-17 DIAGNOSIS — F33.0 MILD EPISODE OF RECURRENT MAJOR DEPRESSIVE DISORDER (HCC): Primary | ICD-10-CM

## 2018-07-17 DIAGNOSIS — Z3A.19 19 WEEKS GESTATION OF PREGNANCY: ICD-10-CM

## 2018-07-17 PROCEDURE — 99213 OFFICE O/P EST LOW 20 MIN: CPT | Performed by: NURSE PRACTITIONER

## 2018-07-17 RX ORDER — SERTRALINE HYDROCHLORIDE 25 MG/1
TABLET, FILM COATED ORAL
Qty: 30 TABLET | Refills: 3 | Status: SHIPPED | OUTPATIENT
Start: 2018-07-17 | End: 2018-09-11 | Stop reason: SDUPTHER

## 2018-07-17 NOTE — PROGRESS NOTES
Assessment/Plan:    Mild episode of recurrent major depressive disorder ([address] )  Well controlled depression  Continue on current sertraline dose  Still planning on weaning in third trimester  F/U in 3 months and can discuss wean at that time  Diagnoses and all orders for this visit:    Mild episode of recurrent major depressive disorder (HCC)  -     sertraline (ZOLOFT) 25 mg tablet; Take 1/2 tab daily for 1 week then increase to 1 tablet daily  19 weeks gestation of pregnancy          Subjective:      Patient ID: Jose L Gonzalez is a 27 y o  female  Mood is good  19 weeks pregnant  Feels calm  More productive  Positive thinking  Denies depressive and anxiety symptoms  No AE from sertraline  Still plans on weaning in third trimester  Sleeping better  The following portions of the patient's history were reviewed and updated as appropriate: allergies, current medications, past family history, past medical history, past social history, past surgical history and problem list     Review of Systems   Constitutional: Negative for fatigue and unexpected weight change  Psychiatric/Behavioral: Negative for dysphoric mood, self-injury, sleep disturbance and suicidal ideas  The patient is not nervous/anxious  Objective:  Vitals:    07/17/18 1752   BP: 132/80   Pulse: 86   Temp: 97 9 °F (36 6 °C)      Physical Exam   Constitutional: She is oriented to person, place, and time  She appears well-developed and well-nourished  Neurological: She is alert and oriented to person, place, and time  Psychiatric: She has a normal mood and affect   Her behavior is normal  Judgment and thought content normal

## 2018-07-17 NOTE — ASSESSMENT & PLAN NOTE
Well controlled depression  Continue on current sertraline dose  Still planning on weaning in third trimester  F/U in 3 months and can discuss wean at that time

## 2018-08-13 DIAGNOSIS — F33.0 MILD EPISODE OF RECURRENT MAJOR DEPRESSIVE DISORDER (HCC): ICD-10-CM

## 2018-08-13 RX ORDER — SERTRALINE HYDROCHLORIDE 25 MG/1
25 TABLET, FILM COATED ORAL DAILY
Qty: 30 TABLET | Refills: 2 | Status: SHIPPED | OUTPATIENT
Start: 2018-08-13 | End: 2018-09-11 | Stop reason: SDUPTHER

## 2018-09-11 ENCOUNTER — OFFICE VISIT (OUTPATIENT)
Dept: FAMILY MEDICINE CLINIC | Facility: HOSPITAL | Age: 33
End: 2018-09-11
Payer: COMMERCIAL

## 2018-09-11 VITALS
HEART RATE: 92 BPM | BODY MASS INDEX: 40.47 KG/M2 | HEIGHT: 66 IN | WEIGHT: 251.8 LBS | SYSTOLIC BLOOD PRESSURE: 128 MMHG | TEMPERATURE: 97.9 F | DIASTOLIC BLOOD PRESSURE: 80 MMHG

## 2018-09-11 DIAGNOSIS — Z3A.28 28 WEEKS GESTATION OF PREGNANCY: Primary | ICD-10-CM

## 2018-09-11 DIAGNOSIS — F33.0 MILD EPISODE OF RECURRENT MAJOR DEPRESSIVE DISORDER (HCC): ICD-10-CM

## 2018-09-11 PROCEDURE — 99213 OFFICE O/P EST LOW 20 MIN: CPT | Performed by: NURSE PRACTITIONER

## 2018-09-11 RX ORDER — SERTRALINE HYDROCHLORIDE 25 MG/1
TABLET, FILM COATED ORAL
Qty: 30 TABLET | Refills: 0 | Status: SHIPPED | OUTPATIENT
Start: 2018-09-11 | End: 2018-11-06 | Stop reason: ALTCHOICE

## 2018-09-11 NOTE — PROGRESS NOTES
Assessment/Plan:    Mild episode of recurrent major depressive disorder ([facility] [address] )  Doing exceptionally well on current dose of sertraline  Pt is requesting to wean off now that she will be in 3rd trimester  Instructed to decrease to 1/2 tab daily (12 5 mg) for the next month to see if her mood is tolerating lower dose  If no depressive symptoms can take 1/2 tab every other day for 1 week then stop  Will f/u in 8 weeks  Call with worsening mood  Diagnoses and all orders for this visit:    28 weeks gestation of pregnancy    Mild episode of recurrent major depressive disorder (HCC)  -     sertraline (ZOLOFT) 25 mg tablet; Take 1/2 tablet daily for 1 month then 1/2 tablet every other day for 1 week then stop  Subjective:      Patient ID: Gavin Martinez is a 27 y o  female  Doing very well  Would like to start to wean Zoloft now that she will be starting third trimester  Mood has been good  No anxiety  Still in therapy  Not as often  Working out  Utilizing coping skills  Denies SI  The following portions of the patient's history were reviewed and updated as appropriate: allergies, current medications, past family history, past medical history, past social history, past surgical history and problem list     Review of Systems   Constitutional: Negative for activity change and appetite change  Psychiatric/Behavioral: Negative for dysphoric mood and suicidal ideas  The patient is not nervous/anxious  Objective:  Vitals:    09/11/18 1711   BP: 128/80   Pulse: 92   Temp: 97 9 °F (36 6 °C)      Physical Exam   Constitutional: She is oriented to person, place, and time  She appears well-developed  Neurological: She is alert and oriented to person, place, and time  Psychiatric: She has a normal mood and affect   Her behavior is normal  Judgment and thought content normal

## 2018-09-11 NOTE — ASSESSMENT & PLAN NOTE
Doing exceptionally well on current dose of sertraline  Pt is requesting to wean off now that she will be in 3rd trimester  Instructed to decrease to 1/2 tab daily (12 5 mg) for the next month to see if her mood is tolerating lower dose  If no depressive symptoms can take 1/2 tab every other day for 1 week then stop  Will f/u in 8 weeks  Call with worsening mood

## 2018-11-06 ENCOUNTER — OFFICE VISIT (OUTPATIENT)
Dept: FAMILY MEDICINE CLINIC | Facility: HOSPITAL | Age: 33
End: 2018-11-06
Payer: COMMERCIAL

## 2018-11-06 VITALS
BODY MASS INDEX: 43.33 KG/M2 | SYSTOLIC BLOOD PRESSURE: 128 MMHG | HEIGHT: 66 IN | WEIGHT: 269.6 LBS | TEMPERATURE: 98.1 F | DIASTOLIC BLOOD PRESSURE: 82 MMHG | HEART RATE: 96 BPM

## 2018-11-06 DIAGNOSIS — F33.0 MILD EPISODE OF RECURRENT MAJOR DEPRESSIVE DISORDER (HCC): Primary | ICD-10-CM

## 2018-11-06 DIAGNOSIS — J45.990 EXERCISE-INDUCED ASTHMA: ICD-10-CM

## 2018-11-06 PROCEDURE — 99213 OFFICE O/P EST LOW 20 MIN: CPT | Performed by: NURSE PRACTITIONER

## 2018-11-06 RX ORDER — ALBUTEROL SULFATE 90 UG/1
1-2 AEROSOL, METERED RESPIRATORY (INHALATION) EVERY 6 HOURS PRN
Qty: 1 INHALER | Refills: 0 | Status: SHIPPED | OUTPATIENT
Start: 2018-11-06 | End: 2021-07-11 | Stop reason: SDUPTHER

## 2018-11-06 NOTE — ASSESSMENT & PLAN NOTE
Controlled off sertraline  Discussed postpartum depression  Pt is very in tune to her mood and will call with any concerns

## 2018-11-06 NOTE — PROGRESS NOTES
Assessment/Plan:    Mild episode of recurrent major depressive disorder (HCC)  Controlled off sertraline  Discussed postpartum depression  Pt is very in tune to her mood and will call with any concerns  Exercise-induced asthma  Controlled  Rare use of rescue inhaler  Only uses with intense exercise  Refill sent  Diagnoses and all orders for this visit:    Mild episode of recurrent major depressive disorder (HCC)    Exercise-induced asthma  -     albuterol (VENTOLIN HFA) 90 mcg/act inhaler; Inhale 1-2 puffs every 6 (six) hours as needed for wheezing          Subjective:      Patient ID: Lottie Merino is a 26 y o  female  Weaned off of sertraline  Has been off for the past 2 weeks  Mood has been good  Due in 4 weeks  Denies depression and anxiety  Would like to stay off of medication for now  Has EIA  Has albuterol inhaler  Uses with hard exercise  Only had to use once while pregnant  The following portions of the patient's history were reviewed and updated as appropriate: allergies, current medications, past family history, past medical history, past social history, past surgical history and problem list     Review of Systems   Constitutional: Negative for chills and fever  Respiratory: Negative for cough, shortness of breath and wheezing  Psychiatric/Behavioral: Negative for agitation, decreased concentration, dysphoric mood, sleep disturbance and suicidal ideas  The patient is not nervous/anxious  Objective:  Vitals:    11/06/18 1711   BP: 128/82   Pulse: 96   Temp: 98 1 °F (36 7 °C)      Physical Exam   Constitutional: She is oriented to person, place, and time  She appears well-developed and well-nourished  Cardiovascular: Normal rate, regular rhythm and normal heart sounds  No murmur heard  Pulmonary/Chest: Effort normal and breath sounds normal    Neurological: She is alert and oriented to person, place, and time  Skin: Skin is warm and dry  Psychiatric: She has a normal mood and affect   Her behavior is normal  Judgment and thought content normal

## 2019-01-23 ENCOUNTER — OFFICE VISIT (OUTPATIENT)
Dept: FAMILY MEDICINE CLINIC | Facility: HOSPITAL | Age: 34
End: 2019-01-23
Payer: COMMERCIAL

## 2019-01-23 VITALS
WEIGHT: 244.2 LBS | SYSTOLIC BLOOD PRESSURE: 130 MMHG | TEMPERATURE: 97.3 F | HEIGHT: 66 IN | DIASTOLIC BLOOD PRESSURE: 84 MMHG | HEART RATE: 102 BPM | BODY MASS INDEX: 39.25 KG/M2

## 2019-01-23 DIAGNOSIS — F41.8 POSTPARTUM ANXIETY: Primary | ICD-10-CM

## 2019-01-23 PROCEDURE — 99214 OFFICE O/P EST MOD 30 MIN: CPT | Performed by: NURSE PRACTITIONER

## 2019-01-23 RX ORDER — FEXOFENADINE HCL 180 MG/1
180 TABLET ORAL DAILY
COMMUNITY
End: 2019-02-25 | Stop reason: ALTCHOICE

## 2019-01-23 RX ORDER — SERTRALINE HYDROCHLORIDE 25 MG/1
25 TABLET, FILM COATED ORAL DAILY
Qty: 30 TABLET | Refills: 1 | Status: SHIPPED | OUTPATIENT
Start: 2019-01-23 | End: 2019-02-25 | Stop reason: SDUPTHER

## 2019-01-23 NOTE — PROGRESS NOTES
Assessment/Plan:    Postpartum anxiety  Anxiety likely due to postpartum hormones and lack of sleep  Given underlying depression and anxiety discussed going back on sertraline which pt is agreeable to  Good social support noted  Denies any thoughts of harm to self or baby  F/U in 4 weeks  Call with any worsneing mood  Diagnoses and all orders for this visit:    Postpartum anxiety  -     sertraline (ZOLOFT) 25 mg tablet; Take 1 tablet (25 mg total) by mouth daily    Other orders  -     fexofenadine (ALLEGRA) 180 MG tablet; Take 180 mg by mouth daily          Subjective:      Patient ID: Tyrone Patterson is a 26 y o  female  Postpartum 6 weeks  Having a lot of anxiety  H/o depression and anxiety  Was treated most of her pregnancy but weaned off by 3 rd trimester  Mood was controlled up to birth  Not sleeping due to baby being up  Having trouble shutting brain off  Worrying about the baby  Trying to control everything  Initially 1 st week postpartum had depressive issues and crying  Got better by 2nd week  Anxiety started about 1 1/2 weeks ago  Feels overwhelmed  Crying because feels on edge  Reports loving her baby and not wanting to harm herself or the baby  Feels she has bonded well with baby  Breastfeeding and having issues with heavy letdown so needs to pump a little prior to nursing  Has lactation consultant  Sharing night feedings with  and giving bottle with breast milk at night  Family has been helping  Tries to nap but can't which makes her anxiety worse   is home with her and will stay at home with baby when she returns at week 12  States she is already worrying about her return to work           The following portions of the patient's history were reviewed and updated as appropriate: allergies, current medications, past family history, past medical history, past social history, past surgical history and problem list     Review of Systems   Constitutional: Positive for fatigue  Negative for activity change, appetite change and unexpected weight change  Psychiatric/Behavioral: Positive for sleep disturbance  Negative for dysphoric mood, self-injury and suicidal ideas  The patient is nervous/anxious  Objective:  Vitals:    01/23/19 1421   BP: 130/84   Pulse: 102   Temp: (!) 97 3 °F (36 3 °C)      Physical Exam   Constitutional: She is oriented to person, place, and time  She appears well-developed and well-nourished  Cardiovascular: Normal rate, regular rhythm and normal heart sounds  Pulmonary/Chest: Effort normal and breath sounds normal    Neurological: She is alert and oriented to person, place, and time  Skin: Skin is warm and dry  Psychiatric: She has a normal mood and affect   Her behavior is normal  Judgment and thought content normal    Tearful at times

## 2019-01-23 NOTE — ASSESSMENT & PLAN NOTE
Anxiety likely due to postpartum hormones and lack of sleep  Given underlying depression and anxiety discussed going back on sertraline which pt is agreeable to  Good social support noted  Denies any thoughts of harm to self or baby  F/U in 4 weeks  Call with any worsneing mood

## 2019-02-25 ENCOUNTER — OFFICE VISIT (OUTPATIENT)
Dept: FAMILY MEDICINE CLINIC | Facility: HOSPITAL | Age: 34
End: 2019-02-25
Payer: COMMERCIAL

## 2019-02-25 VITALS
WEIGHT: 245 LBS | DIASTOLIC BLOOD PRESSURE: 82 MMHG | TEMPERATURE: 97.5 F | BODY MASS INDEX: 39.37 KG/M2 | HEART RATE: 90 BPM | SYSTOLIC BLOOD PRESSURE: 126 MMHG | HEIGHT: 66 IN

## 2019-02-25 DIAGNOSIS — F41.8 POSTPARTUM ANXIETY: ICD-10-CM

## 2019-02-25 DIAGNOSIS — F33.0 MILD EPISODE OF RECURRENT MAJOR DEPRESSIVE DISORDER (HCC): Primary | ICD-10-CM

## 2019-02-25 PROCEDURE — 99213 OFFICE O/P EST LOW 20 MIN: CPT | Performed by: NURSE PRACTITIONER

## 2019-02-25 RX ORDER — SERTRALINE HYDROCHLORIDE 25 MG/1
25 TABLET, FILM COATED ORAL DAILY
Qty: 30 TABLET | Refills: 1 | Status: SHIPPED | OUTPATIENT
Start: 2019-02-25 | End: 2019-08-06 | Stop reason: SDUPTHER

## 2019-02-25 NOTE — PROGRESS NOTES
Assessment/Plan:    Mild episode of recurrent major depressive disorder ([facility] [address] )  Much improved on sertraline  I do feel improved sleep has had great impact on mood also  Pt would like to continue on this regimen  F/U in 2 months  Call with worsening mood  Postpartum anxiety  Much improved and likely due to poor sleep  Continue with sertraline  Diagnoses and all orders for this visit:    Mild episode of recurrent major depressive disorder ([facility] [address] )    Postpartum anxiety  -     sertraline (ZOLOFT) 25 mg tablet; Take 1 tablet (25 mg total) by mouth daily          Subjective:      Patient ID: Valentina Craven is a 26 y o  female  Doing much better  Was not getting sleep due to baby waking frequently  Has reflux and sleeping much better  Still on Zoloft  Anxiety is better controlled  Going back to work at the end of the week   will be staying home with baby  Denies SI  No AE from sertrlaine  Has been on previously w/o problems  PHQ-9 Depression Screening    PHQ-9:    Frequency of the following problems over the past two weeks:       Little interest or pleasure in doing things:  0 - not at all  Feeling down, depressed, or hopeless:  0 - not at all  Trouble falling or staying asleep, or sleeping too much:  0 - not at all  Feeling tired or having little energy:  0 - not at all  Poor appetite or overeatin - not at all  Feeling bad about yourself - or that you are a failure or have let yourself or your family down:  0 - not at all  Trouble concentrating on things, such as reading the newspaper or watching television:  0 - not at all  Moving or speaking so slowly that other people could have noticed   Or the opposite - being so fidgety or restless that you have been moving around a lot more than usual:  0 - not at all  Thoughts that you would be better off dead, or of hurting yourself in some way:  0 - not at all  PHQ-2 Score:  0       DASHAWN-7 Flowsheet Screening      Most Recent Value Over the last two weeks, how often have you been bothered by the following problems? Feeling nervous, anxious, or on edge  0   Not being able to stop or control worrying  0   Trouble relaxing   0   Being so restless that it's hard to sit still  0   Becoming easily annoyed or irritable   0   Feeling afraid as if something awful might happen  0   How difficult have these problems made it for you to do your work, take care of things at home, or get along with other people? Not difficult at all   DASHAWN Score   0        The following portions of the patient's history were reviewed and updated as appropriate: allergies, current medications, past family history, past medical history, past social history, past surgical history and problem list     Review of Systems   Constitutional: Negative for fatigue and unexpected weight change  Psychiatric/Behavioral: Negative for agitation, dysphoric mood, sleep disturbance and suicidal ideas  The patient is not nervous/anxious  Objective:  Vitals:    02/25/19 1034   BP: 126/82   Pulse: 90   Temp: 97 5 °F (36 4 °C)      Physical Exam   Constitutional: She is oriented to person, place, and time  She appears well-developed and well-nourished  Cardiovascular: Regular rhythm and normal heart sounds  Pulmonary/Chest: Effort normal and breath sounds normal    Neurological: She is alert and oriented to person, place, and time  Psychiatric: She has a normal mood and affect   Her behavior is normal  Judgment and thought content normal

## 2019-02-25 NOTE — ASSESSMENT & PLAN NOTE
Much improved on sertraline  I do feel improved sleep has had great impact on mood also  Pt would like to continue on this regimen  F/U in 2 months  Call with worsening mood

## 2019-04-23 ENCOUNTER — OFFICE VISIT (OUTPATIENT)
Dept: FAMILY MEDICINE CLINIC | Facility: HOSPITAL | Age: 34
End: 2019-04-23
Payer: COMMERCIAL

## 2019-04-23 VITALS
HEART RATE: 86 BPM | HEIGHT: 66 IN | WEIGHT: 251 LBS | DIASTOLIC BLOOD PRESSURE: 80 MMHG | SYSTOLIC BLOOD PRESSURE: 124 MMHG | BODY MASS INDEX: 40.34 KG/M2 | TEMPERATURE: 98.4 F

## 2019-04-23 DIAGNOSIS — F41.8 POSTPARTUM ANXIETY: Primary | ICD-10-CM

## 2019-04-23 DIAGNOSIS — J45.990 EXERCISE-INDUCED ASTHMA: ICD-10-CM

## 2019-04-23 DIAGNOSIS — F33.0 MILD EPISODE OF RECURRENT MAJOR DEPRESSIVE DISORDER (HCC): ICD-10-CM

## 2019-04-23 PROCEDURE — 99214 OFFICE O/P EST MOD 30 MIN: CPT | Performed by: NURSE PRACTITIONER

## 2019-06-28 DIAGNOSIS — F41.8 POSTPARTUM ANXIETY: ICD-10-CM

## 2019-06-30 RX ORDER — SERTRALINE HYDROCHLORIDE 25 MG/1
25 TABLET, FILM COATED ORAL DAILY
Qty: 30 TABLET | Refills: 2 | Status: SHIPPED | OUTPATIENT
Start: 2019-06-30 | End: 2019-08-06 | Stop reason: SDUPTHER

## 2019-08-06 ENCOUNTER — OFFICE VISIT (OUTPATIENT)
Dept: FAMILY MEDICINE CLINIC | Facility: HOSPITAL | Age: 34
End: 2019-08-06
Payer: COMMERCIAL

## 2019-08-06 VITALS
TEMPERATURE: 98.2 F | WEIGHT: 256.6 LBS | HEIGHT: 66 IN | DIASTOLIC BLOOD PRESSURE: 80 MMHG | HEART RATE: 94 BPM | SYSTOLIC BLOOD PRESSURE: 132 MMHG | BODY MASS INDEX: 41.24 KG/M2

## 2019-08-06 DIAGNOSIS — E66.01 CLASS 3 SEVERE OBESITY DUE TO EXCESS CALORIES WITHOUT SERIOUS COMORBIDITY WITH BODY MASS INDEX (BMI) OF 40.0 TO 44.9 IN ADULT (HCC): ICD-10-CM

## 2019-08-06 DIAGNOSIS — F41.8 POSTPARTUM ANXIETY: ICD-10-CM

## 2019-08-06 DIAGNOSIS — F33.0 MILD EPISODE OF RECURRENT MAJOR DEPRESSIVE DISORDER (HCC): Primary | ICD-10-CM

## 2019-08-06 DIAGNOSIS — E66.01 MORBID OBESITY WITH BMI OF 40.0-44.9, ADULT (HCC): ICD-10-CM

## 2019-08-06 PROCEDURE — 99214 OFFICE O/P EST MOD 30 MIN: CPT | Performed by: NURSE PRACTITIONER

## 2019-08-06 RX ORDER — SERTRALINE HYDROCHLORIDE 25 MG/1
25 TABLET, FILM COATED ORAL DAILY
Qty: 90 TABLET | Refills: 1 | Status: SHIPPED | OUTPATIENT
Start: 2019-08-06 | End: 2020-03-27

## 2019-08-06 NOTE — PATIENT INSTRUCTIONS
Obesity   AMBULATORY CARE:   Obesity  is when your body mass index (BMI) is greater than 30  Your healthcare provider will use your height and weight to measure your BMI  The risks of obesity include  many health problems, such as injuries or physical disability  You may need tests to check for the following:  · Diabetes     · High blood pressure or high cholesterol     · Heart disease     · Gallbladder or liver disease     · Cancer of the colon, breast, prostate, liver, or kidney     · Sleep apnea     · Arthritis or gout  Seek care immediately if:   · You have a severe headache, confusion, or difficulty speaking  · You have weakness on one side of your body  · You have chest pain, sweating, or shortness of breath  Contact your healthcare provider if:   · You have symptoms of gallbladder or liver disease, such as pain in your upper abdomen  · You have knee or hip pain and discomfort while walking  · You have symptoms of diabetes, such as intense hunger and thirst, and frequent urination  · You have symptoms of sleep apnea, such as snoring or daytime sleepiness  · You have questions or concerns about your condition or care  Treatment for obesity  focuses on helping you lose weight to improve your health  Even a small decrease in BMI can reduce the risk for many health problems  Your healthcare provider will help you set a weight-loss goal   · Lifestyle changes  are the first step in treating obesity  These include making healthy food choices and getting regular physical activity  Your healthcare provider may suggest a weight-loss program that involves coaching, education, and therapy  · Medicine  may help you lose weight when it is used with a healthy diet and physical activity  · Surgery  can help you lose weight if you are very obese and have other health problems  There are several types of weight-loss surgery  Ask your healthcare provider for more information    Be successful losing weight:   · Set small, realistic goals  An example of a small goal is to walk for 20 minutes 5 days a week  Anther goal is to lose 5% of your body weight  · Tell friends, family members, and coworkers about your goals  and ask for their support  Ask a friend to lose weight with you, or join a weight-loss support group  · Identify foods or triggers that may cause you to overeat , and find ways to avoid them  Remove tempting high-calorie foods from your home and workplace  Place a bowl of fresh fruit on your kitchen counter  If stress causes you to eat, then find other ways to cope with stress  · Keep a diary to track what you eat and drink  Also write down how many minutes of physical activity you do each day  Weigh yourself once a week and record it in your diary  Eating changes: You will need to eat 500 to 1,000 fewer calories each day than you currently eat to lose 1 to 2 pounds a week  The following changes will help you cut calories:  · Eat smaller portions  Use small plates, no larger than 9 inches in diameter  Fill your plate half full of fruits and vegetables  Measure your food using measuring cups until you know what a serving size looks like  · Eat 3 meals and 1 or 2 snacks each day  Plan your meals in advance  Jessica Caballero and eat at home most of the time  Eat slowly  · Eat fruits and vegetables at every meal   They are low in calories and high in fiber, which makes you feel full  Do not add butter, margarine, or cream sauce to vegetables  Use herbs to season steamed vegetables  · Eat less fat and fewer fried foods  Eat more baked or grilled chicken and fish  These protein sources are lower in calories and fat than red meat  Limit fast food  Dress your salads with olive oil and vinegar instead of bottled dressing  · Limit the amount of sugar you eat  Do not drink sugary beverages  Limit alcohol  Activity changes:  Physical activity is good for your body in many ways   It helps you burn calories and build strong muscles  It decreases stress and depression, and improves your mood  It can also help you sleep better  Talk to your healthcare provider before you begin an exercise program   · Exercise for at least 30 minutes 5 days a week  Start slowly  Set aside time each day for physical activity that you enjoy and that is convenient for you  It is best to do both weight training and an activity that increases your heart rate, such as walking, bicycling, or swimming  · Find ways to be more active  Do yard work and housecleaning  Walk up the stairs instead of using elevators  Spend your leisure time going to events that require walking, such as outdoor festivals or fairs  This extra physical activity can help you lose weight and keep it off  Follow up with your healthcare provider as directed: You may need to meet with a dietitian  Write down your questions so you remember to ask them during your visits  © 2017 [employer] Information is for End User's use only and may not be sold, redistributed or otherwise used for commercial purposes  All illustrations and images included in CareNotes® are the copyrighted property of [employer]  or [employer]  The above information is an  only  It is not intended as medical advice for individual conditions or treatments  Talk to your doctor, nurse or pharmacist before following any medical regimen to see if it is safe and effective for you

## 2019-08-06 NOTE — ASSESSMENT & PLAN NOTE
Check TSH to assure thyroid is not issue with weight gain  Discussion on eliminating refined sugar, reading labels and continuing to avoid processed food

## 2019-08-06 NOTE — PROGRESS NOTES
Assessment/Plan:    Mild episode of recurrent major depressive disorder (HCC)  Mood well controlled  Continue on current regimen  Call with any worsening mood  F/U in 6 months  Postpartum anxiety  See above plan    Obesity  Check TSH to assure thyroid is not issue with weight gain  Discussion on eliminating refined sugar, reading labels and continuing to avoid processed food  Diagnoses and all orders for this visit:    Mild episode of recurrent major depressive disorder ([address] )    Postpartum anxiety  -     sertraline (ZOLOFT) 25 mg tablet; Take 1 tablet (25 mg total) by mouth daily    Morbid obesity with BMI of 40 0-44 9, adult ([facility])  -     TSH, 3rd generation with Free T4 reflex; Future  -     TSH, 3rd generation with Free T4 reflex    Class 3 severe obesity due to excess calories without serious comorbidity with body mass index (BMI) of 40 0 to 44 9 in adult [facility])          Subjective:      Patient ID: Astrid Encarnacion is a 26 y o  female  Mood is good  Denies depression and anxiety  Doing well on sertraline  Denies AE  Eating and sleeping well  Difficulty losing weight  Has been attempting by cutting out processed foods  The following portions of the patient's history were reviewed and updated as appropriate: allergies, current medications, past family history, past medical history, past social history, past surgical history and problem list     Review of Systems   Constitutional: Positive for unexpected weight change  Psychiatric/Behavioral: Negative for agitation, decreased concentration, dysphoric mood, sleep disturbance and suicidal ideas  The patient is not nervous/anxious  Objective:  Vitals:    08/06/19 1725   BP: 132/80   Pulse: 94   Temp: 98 2 °F (36 8 °C)      Physical Exam   Constitutional: She is oriented to person, place, and time  She appears well-developed and well-nourished  Cardiovascular: Normal rate, regular rhythm and normal heart sounds     No murmur heard   Pulmonary/Chest: Effort normal and breath sounds normal    Neurological: She is alert and oriented to person, place, and time  Skin: Skin is warm and dry  Psychiatric: She has a normal mood and affect  Her behavior is normal  Judgment and thought content normal    Vitals reviewed  BMI Counseling: Body mass index is 41 42 kg/m²  Discussed the patient's BMI with her  The BMI is above average  BMI counseling and education was provided to the patient  Nutrition recommendations include reducing portion sizes, consuming healthier snacks, moderation in carbohydrate intake and increasing intake of lean protein  Exercise recommendations include moderate aerobic physical activity for 150 minutes/week

## 2020-03-27 ENCOUNTER — TELEMEDICINE (OUTPATIENT)
Dept: FAMILY MEDICINE CLINIC | Facility: HOSPITAL | Age: 35
End: 2020-03-27
Payer: COMMERCIAL

## 2020-03-27 ENCOUNTER — TELEPHONE (OUTPATIENT)
Dept: FAMILY MEDICINE CLINIC | Facility: HOSPITAL | Age: 35
End: 2020-03-27

## 2020-03-27 VITALS
SYSTOLIC BLOOD PRESSURE: 118 MMHG | HEIGHT: 66 IN | BODY MASS INDEX: 38.89 KG/M2 | TEMPERATURE: 97 F | DIASTOLIC BLOOD PRESSURE: 78 MMHG | WEIGHT: 242 LBS

## 2020-03-27 DIAGNOSIS — J02.9 PHARYNGITIS, UNSPECIFIED ETIOLOGY: Primary | ICD-10-CM

## 2020-03-27 PROCEDURE — 99213 OFFICE O/P EST LOW 20 MIN: CPT | Performed by: NURSE PRACTITIONER

## 2020-03-27 RX ORDER — MULTIVITAMIN
1 TABLET ORAL DAILY
COMMUNITY

## 2020-03-27 RX ORDER — CETIRIZINE HYDROCHLORIDE 10 MG/1
10 TABLET ORAL DAILY
COMMUNITY
End: 2021-09-23 | Stop reason: HOSPADM

## 2020-03-27 NOTE — PROGRESS NOTES
Virtual Regular Visit    Problem List Items Addressed This Visit     None      Visit Diagnoses     Pharyngitis, unspecified etiology    -  Primary    reassured pt sx's not consistent w/strep or COVID, most likely allergic - continue zyrtec, sudafed, and use flonase q HS; advise no work/isolate until better        Offered to provide work note if needed  Reason for visit is sore throat    Encounter provider JEY Hernandez    Provider located at [address] [facility]  [address] [place] [address]      Recent Visits  No visits were found meeting these conditions  Showing recent visits within past 7 days and meeting all other requirements     Today's Visits  Date Type Provider Dept   03/27/20 Telemedicine JEY Hernandez P[facility], [facility]   03/27/20 Telephone GWEN Kramer Pg, Md   Showing today's visits and meeting all other requirements     Future Appointments  Date Type Provider Dept   03/27/20 Telemedicine JEY Hernandez [facility], [facility]   Showing future appointments within next 150 days and meeting all other requirements        After connecting through [employer], the patient was identified by name and date of birth  Petraprince Marin was informed that this is a telemedicine visit and that the visit is being conducted through [address] and patient was informed that this is not a secure, HIPAA-complaint platform  she agrees to proceed  which may not be secure and therefore, might not be HIPAA-compliant  My office door was closed  No one else was in the room  She acknowledged consent and understanding of privacy and security of the video platform  The patient has agreed to participate and understands they can discontinue the visit at any time  Subjective  Elke Kayevamshi is a 37 y o  female c/o scratchy, itchy throat and lymph nodes are swollen  Tonsils are inflamed  Sinus headache in cheeks   Sx's started yesterday and worse today  No white spots currently as w/previous strep infections (not recently)  Started sudafed yesterday  Hx bad allergies, takes zyrtec year round  Has flonase but not using regularly   at [employer]  Denies covid exposures  Past Medical History:   Diagnosis Date    Depression     last assessed 13    Elevated blood pressure reading     w/o diagnosis of hypertension- Last assessed 09/15/15    Generalized anxiety disorder     last assessed 14    Herniated lumbar intervertebral disc     last assessed 03/10/14    Lumbar radiculopathy     last assessed 03/10/14    Lumbosacral spondylosis without myelopathy     last assessed 03/10/14       Past Surgical History:   Procedure Laterality Date    BREAST LUMPECTOMY Right     resolved      SECTION      TOOTH EXTRACTION         Current Outpatient Medications   Medication Sig Dispense Refill    albuterol (VENTOLIN HFA) 90 mcg/act inhaler Inhale 1-2 puffs every 6 (six) hours as needed for wheezing 1 Inhaler 0    cetirizine (ZyrTEC) 10 mg tablet Take 10 mg by mouth daily      Cyanocobalamin (B-12) 1000 MCG CAPS       Fluticasone Propionate (FLONASE ALLERGY RELIEF NA)       Multiple Vitamin (MULTIVITAMIN) tablet Take 1 tablet by mouth daily       No current facility-administered medications for this visit  Allergies   Allergen Reactions    Morphine And Related     Oxycodone-Acetaminophen        Review of Systems   Constitutional: Negative for appetite change, chills and fever (BID temp checks at work)  HENT: Positive for congestion, sinus pressure (bilat maxillary) and sore throat  Negative for ear pain and trouble swallowing  Respiratory: Negative for cough, chest tightness, shortness of breath and wheezing  Physical Exam   Constitutional: She is oriented to person, place, and time  She appears well-developed and well-nourished  No distress     HENT:   Head: Normocephalic and atraumatic  Pulmonary/Chest: Effort normal  No respiratory distress  No cough   Neurological: She is alert and oriented to person, place, and time  Psychiatric: She has a normal mood and affect  Her behavior is normal         I spent 10 minutes with the patient during this visit

## 2020-03-27 NOTE — TELEPHONE ENCOUNTER
Symptoms started yesterday with itchy throat, today lymph nodes and tonsils are swollen  No white spots  Does get allergies bad and sinuses are inflamed  Today not having an trouble drinking or swallowing but did have some difficulty yesterday  Self isolating last 3 1/2 weeks because she works at [employer]  Only going to work and home  Does not want to come into the office  Did you want to do a virtual visit?

## 2020-05-14 ENCOUNTER — OFFICE VISIT (OUTPATIENT)
Dept: FAMILY MEDICINE CLINIC | Facility: HOSPITAL | Age: 35
End: 2020-05-14
Payer: COMMERCIAL

## 2020-05-14 VITALS
WEIGHT: 238.6 LBS | HEART RATE: 92 BPM | TEMPERATURE: 97.9 F | DIASTOLIC BLOOD PRESSURE: 70 MMHG | HEIGHT: 66 IN | SYSTOLIC BLOOD PRESSURE: 102 MMHG | BODY MASS INDEX: 38.35 KG/M2

## 2020-05-14 DIAGNOSIS — K64.4 EXTERNAL HEMORRHOIDS: Primary | ICD-10-CM

## 2020-05-14 PROCEDURE — 1036F TOBACCO NON-USER: CPT | Performed by: FAMILY MEDICINE

## 2020-05-14 PROCEDURE — 99213 OFFICE O/P EST LOW 20 MIN: CPT | Performed by: FAMILY MEDICINE

## 2020-05-27 PROBLEM — L29.9 PRURITUS AND RELATED CONDITIONS: Status: ACTIVE | Noted: 2020-05-27

## 2020-07-20 PROBLEM — K62.5 RECTAL BLEEDING: Status: ACTIVE | Noted: 2020-07-20

## 2020-08-02 ENCOUNTER — HOSPITAL ENCOUNTER (EMERGENCY)
Facility: HOSPITAL | Age: 35
Discharge: HOME | End: 2020-08-02
Attending: EMERGENCY MEDICINE | Admitting: EMERGENCY MEDICINE
Payer: COMMERCIAL

## 2020-08-02 ENCOUNTER — APPOINTMENT (EMERGENCY)
Dept: RADIOLOGY | Facility: HOSPITAL | Age: 35
End: 2020-08-02
Payer: COMMERCIAL

## 2020-08-02 VITALS
HEART RATE: 78 BPM | BODY MASS INDEX: 39.05 KG/M2 | RESPIRATION RATE: 16 BRPM | SYSTOLIC BLOOD PRESSURE: 144 MMHG | WEIGHT: 243 LBS | HEIGHT: 66 IN | OXYGEN SATURATION: 99 % | DIASTOLIC BLOOD PRESSURE: 78 MMHG | TEMPERATURE: 98.6 F

## 2020-08-02 DIAGNOSIS — S82.899A CLOSED FRACTURE OF ANKLE: Primary | ICD-10-CM

## 2020-08-02 PROCEDURE — 29515 APPLICATION SHORT LEG SPLINT: CPT | Performed by: EMERGENCY MEDICINE

## 2020-08-02 PROCEDURE — 73610 X-RAY EXAM OF ANKLE: CPT

## 2020-08-02 PROCEDURE — 99283 EMERGENCY DEPT VISIT LOW MDM: CPT

## 2020-08-02 PROCEDURE — 99284 EMERGENCY DEPT VISIT MOD MDM: CPT | Performed by: EMERGENCY MEDICINE

## 2020-08-02 RX ORDER — IBUPROFEN 600 MG/1
600 TABLET ORAL ONCE
Status: COMPLETED | OUTPATIENT
Start: 2020-08-02 | End: 2020-08-02

## 2020-08-02 RX ADMIN — IBUPROFEN 600 MG: 600 TABLET, FILM COATED ORAL at 19:45

## 2020-08-02 NOTE — Clinical Note
Isael Rocha was seen and treated in our emergency department on 8/2/2020  No work until cleared by Family Doctor/Orthopedics        Diagnosis:     Elke    She may return on 08/16/2020  If you have any questions or concerns, please don't hesitate to call        Velma Grayson, DO    ______________________________           _______________          _______________  Hospital Representative                              Date                                Time

## 2020-08-02 NOTE — Clinical Note
Marian Steve was seen and treated in our emergency department on 8/2/2020             no use of left leg until follow-up    Diagnosis:     Elke    She may return on 08/03/2020  If you have any questions or concerns, please don't hesitate to call        Hannah Carvajal, DO    ______________________________           _______________          _______________  Hospital Representative                              Date                                Time

## 2020-08-03 NOTE — ED PROVIDER NOTES
History  Chief Complaint   Patient presents with    Ankle Injury     Pt with left ankle and foot pain post walking and hearing a "pop" in the ankle  Patient complains of severe left ankle pain occurred just prior to arrival she was walking and walked into a divot in the grass  She felt her ankle twist and pop and she had severe pain there  She does have a history of a previous fracture years ago  She did not take any pain medicine and she came in right away  She has pain when she walks on it  Prior to Admission Medications   Prescriptions Last Dose Informant Patient Reported? Taking?    Cyanocobalamin (B-12) 1000 MCG CAPS   Yes No   Fluticasone Propionate (FLONASE ALLERGY RELIEF NA)   Yes No   Multiple Vitamin (MULTIVITAMIN) tablet   Yes No   Sig: Take 1 tablet by mouth daily   albuterol (VENTOLIN HFA) 90 mcg/act inhaler   No No   Sig: Inhale 1-2 puffs every 6 (six) hours as needed for wheezing   cetirizine (ZyrTEC) 10 mg tablet   Yes No   Sig: Take 10 mg by mouth daily      Facility-Administered Medications: None       Past Medical History:   Diagnosis Date    Depression     last assessed 13    Elevated blood pressure reading     w/o diagnosis of hypertension- Last assessed 09/15/15    Generalized anxiety disorder     last assessed 14    Herniated lumbar intervertebral disc     last assessed 03/10/14    Lumbar radiculopathy     last assessed 03/10/14    Lumbosacral spondylosis without myelopathy     last assessed 03/10/14       Past Surgical History:   Procedure Laterality Date    BREAST LUMPECTOMY Right     resolved      SECTION      TOOTH EXTRACTION         Family History   Problem Relation Age of Onset    Alcohol abuse Mother     Substance Abuse Mother     Mental illness Mother     Alcohol abuse Father     Substance Abuse Father     Mental illness Father     Ovarian cancer Family     Colon cancer Neg Hx      I have reviewed and agree with the history as documented  E-Cigarette/Vaping    E-Cigarette Use Never User      E-Cigarette/Vaping Substances    Nicotine No     THC No     CBD No     Flavoring No     Other No     Unknown No      Social History     Tobacco Use    Smoking status: Never Smoker    Smokeless tobacco: Never Used   Substance Use Topics    Alcohol use: Yes     Comment: social    Drug use: Never       Review of Systems   All other systems reviewed and are negative  Physical Exam  Physical Exam  Vitals signs and nursing note reviewed  Constitutional:       Appearance: She is well-developed  Eyes:      Conjunctiva/sclera: Conjunctivae normal       Pupils: Pupils are equal, round, and reactive to light  Neck:      Musculoskeletal: Normal range of motion and neck supple  No spinous process tenderness  Cardiovascular:      Rate and Rhythm: Normal rate and regular rhythm  Heart sounds: Normal heart sounds  Pulmonary:      Effort: Pulmonary effort is normal  No respiratory distress  Breath sounds: Normal breath sounds  No wheezing  Abdominal:      General: Bowel sounds are normal  There is no distension  Palpations: Abdomen is soft  Tenderness: There is no abdominal tenderness  Musculoskeletal:      Left ankle: She exhibits decreased range of motion and swelling  Tenderness  Lateral malleolus and AITFL tenderness found  Achilles tendon normal    Skin:     General: Skin is warm and dry  Findings: No rash  Neurological:      Mental Status: She is alert  GCS: GCS eye subscore is 4  GCS verbal subscore is 5  GCS motor subscore is 6  Sensory: No sensory deficit           Vital Signs  ED Triage Vitals   Temperature Pulse Respirations Blood Pressure SpO2   08/02/20 1859 08/02/20 1859 08/02/20 1859 08/02/20 1859 08/02/20 1902   98 6 °F (37 °C) 98 20 160/90 100 %      Temp Source Heart Rate Source Patient Position - Orthostatic VS BP Location FiO2 (%)   08/02/20 1859 08/02/20 1859 08/02/20 1859 08/02/20 1859 --   Temporal Monitor Lying Left arm       Pain Score       08/02/20 1859       6           Vitals:    08/02/20 1859 08/02/20 1902 08/02/20 1930 08/02/20 2000   BP: 160/90  154/84 144/78   Pulse: 98 93 88 78   Patient Position - Orthostatic VS: Lying  Lying Lying         Visual Acuity  Visual Acuity      Most Recent Value   L Pupil Size (mm)  3   R Pupil Size (mm)  3          ED Medications  Medications   ibuprofen (MOTRIN) tablet 600 mg (600 mg Oral Given 8/2/20 1945)       Diagnostic Studies  Results Reviewed     None                 XR ankle 3+ views LEFT   ED Interpretation by Celio Contreras DO (08/02 2006)   Avulsion fractures lateral malleolus                 Procedures  Orthopedic injury treatment    Date/Time: 8/2/2020 3:05 PM  Performed by: Celio Contreras DO  Authorized by: Celio Contreras DO     Patient Location:  ED  Verbal consent obtained?: Yes    Consent given by:  Patient  Patient states understanding of procedure being performed: Yes    Injury location:  Ankle  Location details:  Left ankle  Injury type:  Fracture  Fracture type: lateral malleolus    Fracture type: lateral malleolus    Neurovascular status: Neurovascularly intact    Distal perfusion: normal    Neurological function: normal    Range of motion: normal    Manipulation performed?: No    Immobilization:  Splint  Splint type:  Short leg  Supplies used:  Ortho-Glass  Neurovascular status: Neurovascularly intact    Distal perfusion: normal    Neurological function: normal    Patient tolerance:  Patient tolerated the procedure well with no immediate complications             ED Course       US AUDIT      Most Recent Value   Initial Alcohol Screen: US AUDIT-C    1  How often do you have a drink containing alcohol? 2 Filed at: 08/02/2020 1901   2  How many drinks containing alcohol do you have on a typical day you are drinking? 1 Filed at: 08/02/2020 1901   3a  Male UNDER 65:  How often do you have five or more drinks on one occasion? 0 Filed at: 08/02/2020 1901   3b  FEMALE Any Age, or MALE 65+: How often do you have 4 or more drinks on one occassion? 0 Filed at: 08/02/2020 1901   Audit-C Score  3 Filed at: 08/02/2020 1901                  LEVON/DAST-10      Most Recent Value   How many times in the past year have you    Used an illegal drug or used a prescription medication for non-medical reasons? Never Filed at: 08/02/2020 1901                                [facility]  Number of Diagnoses or Management Options  Closed fracture of ankle: new and requires workup     Amount and/or Complexity of Data Reviewed  Tests in the radiology section of CPT®: ordered and reviewed    Patient Progress  Patient progress: improved        Disposition  Final diagnoses:   Closed fracture of ankle - acute left     Time reflects when diagnosis was documented in both MDM as applicable and the Disposition within this note     Time User Action Codes Description Comment    8/2/2020  8:19 PM Nury Santoro Add [D28 220C] Closed fracture of ankle     8/2/2020  8:19 PM Nury Santoro Modify [H20 275S] Closed fracture of ankle acute left      ED Disposition     ED Disposition Condition Date/Time Comment    Discharge Stable Sun Aug 2, 2020  8:19 PM Elke Moya discharge to home/self care              Follow-up Information     Follow up With Specialties Details Why Contact Info Additional [address] [address] [place] [address] Orthopedic Surgery Call in 1 day  [address] [address] [phone] [address] [place] [address] Specialists [address] [place] [address] 310          Discharge Medication List as of 8/2/2020  8:21 PM      CONTINUE these medications which have NOT CHANGED    Details   albuterol (VENTOLIN HFA) 90 mcg/act inhaler Inhale 1-2 puffs every 6 (six) hours as needed for wheezing, Starting Tue 11/6/2018, Normal      cetirizine (ZyrTEC) 10 mg tablet Take 10 mg by mouth daily, Historical Med      Cyanocobalamin (B-12) 1000 MCG CAPS Starting Wed 1/3/2018, Historical Med      Fluticasone Propionate (FLONASE ALLERGY RELIEF NA) Starting Mon 3/19/2018, Historical Med      Multiple Vitamin (MULTIVITAMIN) tablet Take 1 tablet by mouth daily, Historical Med           No discharge procedures on file      PDMP Review     None          ED Provider  Electronically Signed by           Wade Max DO  08/03/20 0106

## 2020-08-04 ENCOUNTER — OFFICE VISIT (OUTPATIENT)
Dept: OBGYN CLINIC | Facility: CLINIC | Age: 35
End: 2020-08-04
Payer: COMMERCIAL

## 2020-08-04 VITALS
TEMPERATURE: 96.7 F | HEIGHT: 66 IN | DIASTOLIC BLOOD PRESSURE: 70 MMHG | WEIGHT: 243 LBS | BODY MASS INDEX: 39.05 KG/M2 | SYSTOLIC BLOOD PRESSURE: 125 MMHG

## 2020-08-04 DIAGNOSIS — M25.572 PAIN, JOINT, ANKLE AND FOOT, LEFT: ICD-10-CM

## 2020-08-04 DIAGNOSIS — S82.62XA CLOSED AVULSION FRACTURE OF LATERAL MALLEOLUS OF LEFT FIBULA, INITIAL ENCOUNTER: Primary | ICD-10-CM

## 2020-08-04 PROCEDURE — 1036F TOBACCO NON-USER: CPT | Performed by: FAMILY MEDICINE

## 2020-08-04 PROCEDURE — 99203 OFFICE O/P NEW LOW 30 MIN: CPT | Performed by: FAMILY MEDICINE

## 2020-08-04 NOTE — LETTER
August 4, 2020     Patient: Tulio Ferguson   YOB: 1985   Date of Visit: 8/4/2020       To Whom it May Concern:    Nixon Jo is under my professional care  She was seen in my office on 8/4/2020  She may return to work on August 10, 2020 with limitations including frequent breaks as needed due to her current medical condition       If you have any questions or concerns, please don't hesitate to call           Sincerely,          Thea Storey DO        CC: Tulio Ferguson

## 2020-08-04 NOTE — PROGRESS NOTES
Assessment:     1  Closed avulsion fracture of lateral malleolus of left fibula, initial encounter    2  Pain, joint, ankle and foot, left        Plan:     Problem List Items Addressed This Visit        Musculoskeletal and Integument    Avulsion fracture of lateral malleolus of left fibula - Primary       Other    Pain, joint, ankle and foot, left         Subjective:     Patient ID: Miya Barajas is a 37 y o  female  Chief Complaint:  Patient is a 52-year-old female presenting today for evaluation of left ankle pain  She reports 2 days ago tripping and twisting her left ankle while caring her baby  She reported immediate onset of pain in her ankle after hearing a pop  Pain continues today as a throbbing, aching sensation reproduced any attempted ankle movements  Ice and anti-inflammatories provided minimal relief  She denies any numbness or tingling  She denies any warmth or crepitus      Allergy:  Allergies   Allergen Reactions    Morphine And Related     Oxycodone-Acetaminophen      Medications:  all current active meds have been reviewed  Past Medical History:  Past Medical History:   Diagnosis Date    Depression     last assessed 13    Elevated blood pressure reading     w/o diagnosis of hypertension- Last assessed 09/15/15    Generalized anxiety disorder     last assessed 14    Herniated lumbar intervertebral disc     last assessed 03/10/14    Lumbar radiculopathy     last assessed 03/10/14    Lumbosacral spondylosis without myelopathy     last assessed 03/10/14     Past Surgical History:  Past Surgical History:   Procedure Laterality Date    BREAST LUMPECTOMY Right     resolved 1997     SECTION      TOOTH EXTRACTION       Family History:  Family History   Problem Relation Age of Onset    Alcohol abuse Mother     Substance Abuse Mother     Mental illness Mother     Alcohol abuse Father     Substance Abuse Father     Mental illness Father     Ovarian cancer Family  Colon cancer Neg Hx      Social History:  Social History     Substance and Sexual Activity   Alcohol Use Yes    Comment: social     Social History     Substance and Sexual Activity   Drug Use Never     Social History     Tobacco Use   Smoking Status Never Smoker   Smokeless Tobacco Never Used     Review of Systems   Constitutional: Negative  HENT: Negative  Eyes: Negative  Respiratory: Negative  Cardiovascular: Negative  Gastrointestinal: Negative  Genitourinary: Negative  Musculoskeletal: Positive for arthralgias and myalgias  Skin: Negative  Allergic/Immunologic: Negative  Neurological: Negative  Hematological: Negative  Psychiatric/Behavioral: Negative  Objective:  BP Readings from Last 1 Encounters:   08/04/20 125/70      Wt Readings from Last 1 Encounters:   08/04/20 110 kg (243 lb)      BMI:   Estimated body mass index is 39 22 kg/m² as calculated from the following:    Height as of this encounter: 5' 6"  Weight as of this encounter: 110 kg (243 lb)  BSA:   Estimated body surface area is 2 17 meters squared as calculated from the following:    Height as of this encounter: 5' 6"  Weight as of this encounter: 110 kg (243 lb)  Physical Exam   Constitutional: She is oriented to person, place, and time  She appears well-developed  HENT:   Head: Normocephalic  Eyes: Pupils are equal, round, and reactive to light  Pulmonary/Chest: Effort normal    Neurological: She is alert and oriented to person, place, and time  Skin: Skin is warm and dry  Nursing note and vitals reviewed  Left Ankle Exam     Tenderness   The patient is experiencing tenderness in the lateral malleolus     Swelling: moderate    Range of Motion   Dorsiflexion: abnormal   Plantar flexion: abnormal   Eversion: abnormal   Inversion: abnormal     Tests   Anterior drawer: negative  Varus tilt: negative    Other   Erythema: absent  Sensation: normal  Pulse: present            I have personally reviewed pertinent films in PACS  Stable, nondisplaced avulsion fracture of the lateral malleolus

## 2020-08-11 ENCOUNTER — TELEPHONE (OUTPATIENT)
Dept: OBGYN CLINIC | Facility: HOSPITAL | Age: 35
End: 2020-08-11

## 2020-08-13 NOTE — TELEPHONE ENCOUNTER
Called pt and left her a voicemail  She can go to [employer] across the building in [facility] and get fitted in the right cam boot 
Patient sees Dr Delia Koch  Patient was in last week and received a cam boot but states it to too big  We would like to know if she can come to the office to get a new one or does she have to go through A DME store such as [employer]?       CB: [phone]
breath sounds equal/respirations non-labored/clear to auscultation bilaterally/airway patent/good air movement

## 2020-09-01 ENCOUNTER — OFFICE VISIT (OUTPATIENT)
Dept: OBGYN CLINIC | Facility: CLINIC | Age: 35
End: 2020-09-01
Payer: COMMERCIAL

## 2020-09-01 VITALS
TEMPERATURE: 97.6 F | BODY MASS INDEX: 39.05 KG/M2 | WEIGHT: 243 LBS | SYSTOLIC BLOOD PRESSURE: 122 MMHG | DIASTOLIC BLOOD PRESSURE: 82 MMHG | HEIGHT: 66 IN

## 2020-09-01 DIAGNOSIS — S82.62XA CLOSED AVULSION FRACTURE OF LATERAL MALLEOLUS OF LEFT FIBULA, INITIAL ENCOUNTER: Primary | ICD-10-CM

## 2020-09-01 PROCEDURE — 99203 OFFICE O/P NEW LOW 30 MIN: CPT | Performed by: ORTHOPAEDIC SURGERY

## 2020-09-01 NOTE — ASSESSMENT & PLAN NOTE
Findings consistent with healing distal fibula avulsion fracture left 8/2/20  She can transition from CAM boot into custom brace she will get  Order for brace will be faxed to rep and she should get a call within a few days, they will come to house and fit  If she doesn't hear anything in a week call us  She will also start PT for motion, strengthening  She also discussed on way out possibly having leg length discrepancy from broken pelvis when 17, discussed trying off the shelf gel inserts first   Follow-up in 6 weeks  All patient's questions were answered to her satisfaction  This note is created using dictation transcription  It may contain typographical errors, grammatical errors, improperly dictated words, background noise and other errors

## 2020-09-01 NOTE — PROGRESS NOTES
Assessment:     1  Closed avulsion fracture of lateral malleolus of left fibula, initial encounter        Plan:     Problem List Items Addressed This Visit        Musculoskeletal and Integument    Avulsion fracture of lateral malleolus of left fibula - Primary     Findings consistent with healing distal fibula avulsion fracture left 8/2/20  She can transition from CAM boot into custom brace she will get  Order for brace will be faxed to rep and she should get a call within a few days, they will come to house and fit  If she doesn't hear anything in a week call us  She will also start PT for motion, strengthening  She also discussed on way out possibly having leg length discrepancy from broken pelvis when 17, discussed trying off the shelf gel inserts first   Follow-up in 6 weeks  All patient's questions were answered to her satisfaction  This note is created using dictation transcription  It may contain typographical errors, grammatical errors, improperly dictated words, background noise and other errors  Relevant Orders    Ambulatory referral to Physical Therapy          Subjective:     Patient ID: Rebekah Will is a 37 y o  female  Chief Complaint:  37 yr old female in for evaluation of her left ankle  She was treating with Dr Colette Hercules previously for avulsion fracture of left distal fibula sustained 8/2/20  She presents in CAM boot WBAT  She has no pain walking in boot  She has walked short distances in house with mild pain  Ankle feels stiff, weak  She requires no pain medication, ices and elevates at end of day       Allergy:  Allergies   Allergen Reactions    Morphine And Related     Oxycodone-Acetaminophen      Medications:  all current active meds have been reviewed  Past Medical History:  Past Medical History:   Diagnosis Date    Depression     last assessed 11/25/13    Elevated blood pressure reading     w/o diagnosis of hypertension- Last assessed 09/15/15    Generalized anxiety disorder     last assessed 14    Herniated lumbar intervertebral disc     last assessed 03/10/14    Lumbar radiculopathy     last assessed 03/10/14    Lumbosacral spondylosis without myelopathy     last assessed 03/10/14     Past Surgical History:  Past Surgical History:   Procedure Laterality Date    BREAST LUMPECTOMY Right     resolved 1997     SECTION      TOOTH EXTRACTION       Family History:  Family History   Problem Relation Age of Onset    Alcohol abuse Mother     Substance Abuse Mother     Mental illness Mother     Alcohol abuse Father     Substance Abuse Father     Mental illness Father     Ovarian cancer Family     Colon cancer Neg Hx      Social History:  Social History     Substance and Sexual Activity   Alcohol Use Yes    Comment: social     Social History     Substance and Sexual Activity   Drug Use Never     Social History     Tobacco Use   Smoking Status Never Smoker   Smokeless Tobacco Never Used     Review of Systems   Constitutional: Negative for chills and fever  HENT: Negative for drooling and sneezing  Eyes: Negative for redness  Respiratory: Negative for cough and wheezing  Cardiovascular: Negative for chest pain  Gastrointestinal: Negative for nausea and vomiting  Genitourinary: Negative  Musculoskeletal: Positive for gait problem (Wearing Cam walker on the left)  As reviewed in HPI   Skin: Negative for rash  Psychiatric/Behavioral: The patient is not nervous/anxious  Objective:  BP Readings from Last 1 Encounters:   20 122/82      Wt Readings from Last 1 Encounters:   20 110 kg (243 lb)      BMI:   Estimated body mass index is 39 22 kg/m² as calculated from the following:    Height as of this encounter: 5' 6" (1 676 m)  Weight as of this encounter: 110 kg (243 lb)  BSA:   Estimated body surface area is 2 17 meters squared as calculated from the following:    Height as of this encounter: 5' 6" (1 676 m)      Weight as of this encounter: 110 kg (243 lb)  Physical Exam  Vitals signs and nursing note reviewed  Constitutional:       Appearance: Normal appearance  She is well-developed  HENT:      Head: Normocephalic and atraumatic  Right Ear: External ear normal       Left Ear: External ear normal    Eyes:      Extraocular Movements: Extraocular movements intact  Conjunctiva/sclera: Conjunctivae normal    Neck:      Musculoskeletal: Neck supple  Pulmonary:      Effort: Pulmonary effort is normal    Musculoskeletal:      Right knee: She exhibits no effusion  Skin:     General: Skin is warm and dry  Neurological:      Mental Status: She is alert and oriented to person, place, and time  Deep Tendon Reflexes: Reflexes are normal and symmetric  Psychiatric:         Behavior: Behavior normal          Thought Content: Thought content normal          Judgment: Judgment normal        Left Ankle Exam     Tenderness   Left ankle tenderness location: distal fibula    Swelling: none    Range of Motion   Dorsiflexion: 10   Plantar flexion: 50     Tests   Anterior drawer: negative  Varus tilt: negative    Other   Erythema: absent  Scars: absent  Sensation: normal  Pulse: present      Right Knee Exam     Other   Effusion: no effusion present            I have personally reviewed pertinent films in PACS and my interpretation is eft ankle x-ray showed avulsion fracture distal fibula          Scribe Attestation    I,:   Rosey Regalado am acting as a scribe while in the presence of the attending physician :        I,:   Bob Camargo MD personally performed the services described in this documentation    as scribed in my presence :

## 2020-09-02 ENCOUNTER — TELEPHONE (OUTPATIENT)
Dept: OBGYN CLINIC | Facility: HOSPITAL | Age: 35
End: 2020-09-02

## 2020-09-02 NOTE — TELEPHONE ENCOUNTER
Patient is calling stating that she may need the order sent to [facility] she is going to call back to see if the order she has is suffiecient for them since it was supposed to go to another [employer] company but they do not participate with her insurance

## 2020-09-04 ENCOUNTER — TELEPHONE (OUTPATIENT)
Dept: OBGYN CLINIC | Facility: HOSPITAL | Age: 35
End: 2020-09-04

## 2020-09-04 DIAGNOSIS — S82.62XD CLOSED AVULSION FRACTURE OF LATERAL MALLEOLUS OF LEFT FIBULA WITH ROUTINE HEALING, SUBSEQUENT ENCOUNTER: Primary | ICD-10-CM

## 2020-09-04 NOTE — TELEPHONE ENCOUNTER
Patient sees Dr Ayo Alford  Patient called asking, if there is any possibility to order another brace for her L ankle? [facility] in [facility] accept her insurance, but they do not have the brace that was ordered  She also called [id number] [address] [address] in [facility] and they do not have the brace, it will take about 4 weeks to receive it  Patient is asking for advise     #[phone]

## 2020-09-09 ENCOUNTER — EVALUATION (OUTPATIENT)
Dept: PHYSICAL THERAPY | Facility: CLINIC | Age: 35
End: 2020-09-09
Payer: COMMERCIAL

## 2020-09-09 DIAGNOSIS — S82.62XD CLOSED AVULSION FRACTURE OF LATERAL MALLEOLUS OF LEFT FIBULA WITH ROUTINE HEALING, SUBSEQUENT ENCOUNTER: ICD-10-CM

## 2020-09-09 PROCEDURE — 97162 PT EVAL MOD COMPLEX 30 MIN: CPT | Performed by: PHYSICAL THERAPIST

## 2020-09-09 PROCEDURE — 97110 THERAPEUTIC EXERCISES: CPT | Performed by: PHYSICAL THERAPIST

## 2020-09-09 NOTE — PROGRESS NOTES
PT Evaluation     Today's date: 2020  Patient name: Tyrone Patterson  : 1985  MRN: [id number]  Referring provider: Emanuel Pelayo MD  Dx:   Encounter Diagnosis     ICD-10-CM    1  Closed avulsion fracture of lateral malleolus of left fibula with routine healing, subsequent encounter  S82  62XD Ambulatory referral to Physical Therapy                  Assessment  Assessment details: Tyrone Patterson is a 37 y o  female presenting to outpatient physical therapy with chief complaints of (L) ankle pain  Patient describes inversion injury 5 weeks ago  Patient displays with abnormal gait, (L) ankle AROM restrictions, (L) ankle strength deficits, balance impairments, and functional restrictions  Patient's symptoms are multifactorial in nature with a primary movement diagnosis of poor motor control resulting in pathoanatomical signs and symptoms consistent with closed (L) fibula avulsion fracture resulting in limitations in her ability to return to exercising regularly  No further referral appears necessary at this time based upon examination results  Please contact me if you have any questions  Thank you for the opportunity to share in the care of this patient  Impairments: abnormal gait, abnormal muscle tone, abnormal or restricted ROM, activity intolerance, impaired balance, impaired physical strength, lacks appropriate home exercise program, pain with function and poor body mechanics  Understanding of Dx/Px/POC: good   Prognosis: good  Prognosis details: Positive prognostic indicators include positive attitude toward recovery, motivated to improve, high self-efficacy, good understanding of condition, realistic expectations  Goals  STG to be met in 3 weeks (20):  - Increase (L) Ankle strength by 1/2 MMT grade  - Improve SL balance to 15 seconds  - I with HEP  LTG to be met in 6 weeks (10/21/20):  - Increase (L) Ankle AROM: DF with KE 15 degrees    - Increase (L) Ankle strength to 5/5 all planes  - Improve SL balance to 30 seconds  - I with updated HEP   - Patient will be able to return to regular exercise routine  Plan  Plan details: Prognosis above is given PT services 1x/week over the next 6 weeks and home program adherence  Patient would benefit from: skilled physical therapy  Planned modality interventions: cryotherapy and thermotherapy: hydrocollator packs  Planned therapy interventions: joint mobilization, manual therapy, activity modification, balance, neuromuscular re-education, body mechanics training, patient education, strengthening, stretching, therapeutic activities, therapeutic exercise, functional ROM exercises, home exercise program and gait training  Plan of Care beginning date: 2020  Plan of Care expiration date: 10/21/2020  Treatment plan discussed with: patient and PTA        Subjective Evaluation    History of Present Illness  Mechanism of injury: Patient reports she was walking in a field about 5 weeks ago - stepped in a divot and turned to protect her daughter  Her ankle turned in and she heard a pop  She went to the ED - x-rays were performed and she followed up with Dr Octavio Browning  She was issued a CAM boot for 4 weeks  She returned to see Dr Bayron He - brace was ordered and PT was recommended  Red Flag Screen:  Patient denies recent fever, changes in bowel and bladder function, nausea and vomiting, unexplained weight loss, tingling, numbness, or pain with coughing    Patient reports traumatic VANDA 5 weeks ago, weakness in the ankle associated with immobility       Greatest concern: not reinjuring her ankle    Quality of life: good    Pain  Current pain ratin  At best pain ratin  At worst pain ratin  Location: (L) ankle - lateral   Quality: dull ache and pressure    Social Support  Steps to enter house: no  Stairs in house: yes   Lives in: [facility]  Lives with: spouse and young children    Employment status: working ( at nursing home)    Diagnostic Tests  X-ray: abnormal  Treatments  Previous treatment: medication  Patient Goals  Patient goal: Return to exercising - bike riding, walking, running; Feeling confident in the ankle with lifting        Objective     Static Posture   General Observations  Symmetrical weight bearing  Ankle/Foot   Ankle/Foot (Left): Pes planus  Palpation     Additional Palpation Details  TTP (L) ATFL region - no tenderness along distal fibula     Active Range of Motion   Left Ankle/Foot   Dorsiflexion (ke): 10 degrees   Dorsiflexion (kf): 15 degrees   Plantar flexion: WFL  Inversion: WFL  Eversion: WFL  Great toe extension: WFL    Right Ankle/Foot   Dorsiflexion (ke): 15 degrees   Dorsiflexion (kf): 20 degrees   Plantar flexion: WFL  Inversion: WFL  Eversion: WFL  Great toe extension: WFL    Strength/Myotome Testing     Left Ankle/Foot   Dorsiflexion: 5  Plantar flexion: 4  Inversion: 4  Eversion: 4-    Right Ankle/Foot   Dorsiflexion: 5  Plantar flexion: 5  Inversion: 5  Eversion: 5    Tests     Additional Tests Details  Diagnostic testing not performed secondary to recent fracture     Swelling   Left Ankle/Foot   Figure 8: 53 cm    Right Ankle/Foot   Figure 8: 53 cm    Ambulation     Observational Gait   Gait: antalgic   Right stance time and right step length within functional limits  Decreased walking speed, left stance time and left step length     Left foot contact pattern: foot flat  Right foot contact pattern: foot flat  Left arm swing: decreased  Right arm swing: decreased    Functional Assessment        Comments  Squat: good depth - no weight shift - increased left ankle pain at end range  SL Balance: EO (R) 30 sec - ankle strategy; EO (L)  5 sec - hip strategy              Daily Treatment Diary     DX: (L) fibula avulsion fracture  EPOC:10/21/20  Precautions: NA  CO-MORBIDITIES: NA    Stage: subacute  Stability of Symptoms: evolving - improving   Symptom Irritability Level: moderate  Primary Movement Diagnosis: poor motor control   Goal(s):   STG to be met in 3 weeks (9/30/20):  - Increase (L) Ankle strength by 1/2 MMT grade  - Improve SL balance to 15 seconds  - I with HEP  LTG to be met in 6 weeks (10/21/20):  - Increase (L) Ankle AROM: DF with KE 15 degrees  - Increase (L) Ankle strength to 5/5 all planes  - Improve SL balance to 30 seconds  - I with updated HEP   - Patient will be able to return to regular exercise routine     Greatest Concern: not reinjuring ankle  Current Activity Recommendations: added to HEP (9/9)  Current Educational Needs: progressions       Manual          (L) Ankle PROM                                                 Exercise Diary  HEP         Therapeutic Exercise           Recumbent Bike                    Ankle Pumps 9/9         Ankle Circles 9/9                   RB Taps - 2 Way          RB Balance - 2 Way          Seated Gastroc Stretch 9/9         Standing Gastroc Stretch 9/9                             Neuromuscular Reeducation                    TB Ankle DF/PF          TB Ankle INV/EV                    SL balance                     FWD Mini Lunge          LAT Mini Lunge          Mini Squat                              Therapeutic Activity                              Gait Training                        Modalities

## 2020-09-14 ENCOUNTER — OFFICE VISIT (OUTPATIENT)
Dept: PHYSICAL THERAPY | Facility: CLINIC | Age: 35
End: 2020-09-14
Payer: COMMERCIAL

## 2020-09-14 DIAGNOSIS — S82.62XD CLOSED AVULSION FRACTURE OF LATERAL MALLEOLUS OF LEFT FIBULA WITH ROUTINE HEALING, SUBSEQUENT ENCOUNTER: Primary | ICD-10-CM

## 2020-09-14 PROCEDURE — 97112 NEUROMUSCULAR REEDUCATION: CPT | Performed by: PHYSICAL THERAPIST

## 2020-09-14 PROCEDURE — 97140 MANUAL THERAPY 1/> REGIONS: CPT | Performed by: PHYSICAL THERAPIST

## 2020-09-14 PROCEDURE — 97110 THERAPEUTIC EXERCISES: CPT | Performed by: PHYSICAL THERAPIST

## 2020-09-14 NOTE — PROGRESS NOTES
Daily Note     Today's date: 2020  Patient name: Juan Daniel Herrera  : 1985  MRN: [id number]  Referring provider: Lee Zuniga MD  Dx:   Encounter Diagnosis     ICD-10-CM    1  Closed avulsion fracture of lateral malleolus of left fibula with routine healing, subsequent encounter  S82  62XD                   Subjective: Patient reports good tolerance to her IE  She notes going on a 1 mile hike this weekend and had no increased pain  She has been performing HEP without questions  Objective: See treatment diary below      Assessment:   Stage: subacute  Stability of Symptoms: evolving - improving   Symptom Irritability Level: moderate  Primary Movement Diagnosis: poor motor control   Goal(s):   STG to be met in 3 weeks (20):  - Increase (L) Ankle strength by 1/2 MMT grade  - Improve SL balance to 15 seconds  - I with HEP  LTG to be met in 6 weeks (10/21/20):  - Increase (L) Ankle AROM: DF with KE 15 degrees  - Increase (L) Ankle strength to 5/5 all planes  - Improve SL balance to 30 seconds  - I with updated HEP   - Patient will be able to return to regular exercise routine  Greatest Concern: not reinjuring ankle  Current Activity Recommendations: added to HEP (); continue wearing ankle brace outside of the house ()  Current Educational Needs: progressions     Patient had good tolerance to manual stretching - improved DF following  She demonstrated good exercise form with previously issued HEP  Patient had good tolerance RB exercises and TB exercises  She had no complaints of increased pain post treatment       Plan: Continue with POC - monitor tolerance to initial treatment - progress as able     Daily Treatment Diary     DX: (L) fibula avulsion fracture  EPOC:10/21/20  Precautions: NA  CO-MORBIDITIES: NA      Manual          (L) Ankle PROM 8 min                                                Exercise Diary  HEP         Therapeutic Exercise           Recumbent Bike  6 min                  Ankle Pumps 9/9 20x        Ankle Circles 9/9 CW/ CCW  20x ea        Ankle Alphabet 9/9 1x        RB Taps - 2 Way  20x ea        RB Balance - 2 Way  20"x2 ea        Seated Gastroc Stretch 9/9 30"x3         Standing Gastroc Stretch 9/9 30"x3                            Neuromuscular Reeducation                    TB Ankle DF/PF  OTB   20x ea        TB Ankle INV/EV  OTB  20x ea                  SL Balance                     FWD Mini Lunge          LAT Mini Lunge          Mini Squat                              Therapeutic Activity                              Gait Training                        Modalities

## 2020-09-21 ENCOUNTER — OFFICE VISIT (OUTPATIENT)
Dept: PHYSICAL THERAPY | Facility: CLINIC | Age: 35
End: 2020-09-21
Payer: COMMERCIAL

## 2020-09-21 DIAGNOSIS — S82.62XD CLOSED AVULSION FRACTURE OF LATERAL MALLEOLUS OF LEFT FIBULA WITH ROUTINE HEALING, SUBSEQUENT ENCOUNTER: Primary | ICD-10-CM

## 2020-09-21 PROCEDURE — 97112 NEUROMUSCULAR REEDUCATION: CPT | Performed by: PHYSICAL THERAPIST

## 2020-09-21 PROCEDURE — 97140 MANUAL THERAPY 1/> REGIONS: CPT | Performed by: PHYSICAL THERAPIST

## 2020-09-21 PROCEDURE — 97110 THERAPEUTIC EXERCISES: CPT | Performed by: PHYSICAL THERAPIST

## 2020-09-21 NOTE — PROGRESS NOTES
Daily Note     Today's date: 2020  Patient name: Angely Alvarado  : 1985  MRN: [id number]  Referring provider: Gabby Diaz MD  Dx:   Encounter Diagnosis     ICD-10-CM    1  Closed avulsion fracture of lateral malleolus of left fibula with routine healing, subsequent encounter  S82  62XD                   Subjective: Patient reports good tolerance to her IE  She reports her HEP is going well  Has seen good improvement in function - was able to play with daughter on jungle gym and go for several walks this weekend  Objective: See treatment diary below      Assessment:   Stage: subacute  Stability of Symptoms: evolving - improving   Symptom Irritability Level: moderate  Primary Movement Diagnosis: poor motor control   Goal(s):   STG to be met in 3 weeks (20):  - Increase (L) Ankle strength by 1/2 MMT grade  - Improve SL balance to 15 seconds  - I with HEP  LTG to be met in 6 weeks (10/21/20):  - Increase (L) Ankle AROM: DF with KE 15 degrees  - Increase (L) Ankle strength to 5/5 all planes  - Improve SL balance to 30 seconds  - I with updated HEP   - Patient will be able to return to regular exercise routine  Greatest Concern: not reinjuring ankle  Current Activity Recommendations: added to HEP (); continue wearing ankle brace outside of the house (); added to HEP ()  Current Educational Needs: progressions     Patient had continues to progress well with manual stretching  She displayed improved form and neuromuscular control with balance exercises  She had no complaints of increased pain with performing WB exercises          Plan: Continue with POC - monitor tolerance to treatment progressions and updated HEP       Daily Treatment Diary     DX: (L) fibula avulsion fracture  EPOC:10/21/20  Precautions: NA  CO-MORBIDITIES: NA      Manual         (L) Calf ART   3 min       (L) Ankle PROM 8 min 5 min                                               Exercise Diary HEP 9/14 9/21       Therapeutic Exercise           Recumbent Bike  6 min 6 min                 Ankle Pumps 9/9 20x 20x        Ankle Circles 9/9 CW/ CCW  20x ea CW/ CCW  20x ea       Ankle Alphabet 9/9 1x 1x       RB Taps - 2 Way  20x ea 20x ea       RB Balance - 2 Way  20"x2 ea 20"x2 ea       Seated Gastroc Stretch 9/9 30"x3  30"x3       Standing Gastroc Stretch 9/9 30"x3 30"x3                           Neuromuscular Reeducation                    TB Ankle DF/PF  OTB   20x ea OTB  20x ea       TB Ankle INV/EV  OTB  20x ea OTB  20x ea                 SL Balance    20"x3        TB Counter Balance   GTB  10x ea                 FWD Mini Lunge 9/21  10x ea       LAT Mini Lunge 9/21  10x ea       Mini Squat 9/21  10x                 FWD Heel Tap          LAT Heel Tap          FWD Step Up                    Therapeutic Activity                              Gait Training                        Modalities

## 2020-09-28 ENCOUNTER — OFFICE VISIT (OUTPATIENT)
Dept: PHYSICAL THERAPY | Facility: CLINIC | Age: 35
End: 2020-09-28
Payer: COMMERCIAL

## 2020-09-28 DIAGNOSIS — S82.62XD CLOSED AVULSION FRACTURE OF LATERAL MALLEOLUS OF LEFT FIBULA WITH ROUTINE HEALING, SUBSEQUENT ENCOUNTER: Primary | ICD-10-CM

## 2020-09-28 PROCEDURE — 97112 NEUROMUSCULAR REEDUCATION: CPT

## 2020-09-28 PROCEDURE — 97110 THERAPEUTIC EXERCISES: CPT

## 2020-09-28 PROCEDURE — 97140 MANUAL THERAPY 1/> REGIONS: CPT

## 2020-09-28 NOTE — PROGRESS NOTES
Daily Note     Today's date: 2020  Patient name: Valentina Craven  : 1985  MRN: [id number]  Referring provider: Rin Gross MD  Dx:   Encounter Diagnosis     ICD-10-CM    1  Closed avulsion fracture of lateral malleolus of left fibula with routine healing, subsequent encounter  S82  62XD                   Subjective: Patient reports good tolerance to her LV  She reports she has been trying to ween out of ankle brace and did not wear Tues-Thurs  , but by Friday her ankle was sore so she wore the brace all day Friday  Since then she has been feeling great  She has been consistent with HEP performing 2-3 times per day  Objective: See treatment diary below      Assessment:   Stage: subacute  Stability of Symptoms: evolving - improving   Symptom Irritability Level: moderate  Primary Movement Diagnosis: poor motor control   Goal(s):   STG to be met in 3 weeks (20):  - Increase (L) Ankle strength by 1/2 MMT grade  - Improve SL balance to 15 seconds  - I with HEP  LTG to be met in 6 weeks (10/21/20):  - Increase (L) Ankle AROM: DF with KE 15 degrees  - Increase (L) Ankle strength to 5/5 all planes  - Improve SL balance to 30 seconds  - I with updated HEP   - Patient will be able to return to regular exercise routine  Greatest Concern: not reinjuring ankle  Current Activity Recommendations: added to HEP (); continue wearing ankle brace outside of the house (); added to HEP ()  Current Educational Needs: progressions     Patient continues to progress very well  Her ROM is moving well with no discomfort reported  She was able to tolerate progressed program with no adverse effects  Her SL balance and on uneven surface has improved  Continue to work on strength and stability to improve motor control         Plan: Continue with POC - monitor tolerance to treatment progressions and weaning completely from brace        Daily Treatment Diary     DX: (L) fibula avulsion fracture  EPOC:10/21/20  Precautions: NA  CO-MORBIDITIES: NA      Manual  9/14 9/21 9/28      (L) Calf ART   3 min 3 min      (L) Ankle PROM 8 min 5 min 5 min                                              Exercise Diary  HEP 9/14 9/21 9/28      Therapeutic Exercise           Recumbent Bike  6 min 6 min 6 min                Ankle Pumps 9/9 20x 20x  20x      Ankle Circles 9/9 CW/ CCW  20x ea CW/ CCW  20x ea CW/ CCW  20x ea      Ankle Alphabet 9/9 1x 1x 1x      RB Taps - 2 Way  20x ea 20x ea 20x ea      RB Balance - 2 Way  20"x2 ea 20"x2 ea 20"x3 ea      Seated Gastroc Stretch 9/9 30"x3  30"x3 30"x3      Standing Gastroc Stretch 9/9 30"x3 30"x3 30"x3                          Neuromuscular Reeducation                    TB Ankle DF/PF  OTB   20x ea OTB  20x ea GTB  x20 ea      TB Ankle INV/EV  OTB  20x ea OTB  20x ea GTB  x20 ea                SL Balance    20"x3  20"x3      TB Counter Balance   GTB  10x ea GTB  10x ea                FWD Mini Lunge 9/21  10x ea 10x ea      LAT Mini Lunge 9/21  10x ea 10x ea      Mini Squat 9/21  10x 10x                FWD Heel Tap    x10 ea      LAT Heel Tap    x10 ea      FWD Step Up                    Therapeutic Activity                              Gait Training                        Modalities

## 2020-10-05 ENCOUNTER — OFFICE VISIT (OUTPATIENT)
Dept: PHYSICAL THERAPY | Facility: CLINIC | Age: 35
End: 2020-10-05
Payer: COMMERCIAL

## 2020-10-05 DIAGNOSIS — S82.62XD CLOSED AVULSION FRACTURE OF LATERAL MALLEOLUS OF LEFT FIBULA WITH ROUTINE HEALING, SUBSEQUENT ENCOUNTER: Primary | ICD-10-CM

## 2020-10-05 PROCEDURE — 97112 NEUROMUSCULAR REEDUCATION: CPT

## 2020-10-05 PROCEDURE — 97140 MANUAL THERAPY 1/> REGIONS: CPT

## 2020-10-05 PROCEDURE — 97110 THERAPEUTIC EXERCISES: CPT

## 2020-10-12 ENCOUNTER — APPOINTMENT (OUTPATIENT)
Dept: PHYSICAL THERAPY | Facility: CLINIC | Age: 35
End: 2020-10-12
Payer: COMMERCIAL

## 2020-10-19 ENCOUNTER — APPOINTMENT (OUTPATIENT)
Dept: PHYSICAL THERAPY | Facility: CLINIC | Age: 35
End: 2020-10-19
Payer: COMMERCIAL

## 2020-10-26 ENCOUNTER — APPOINTMENT (OUTPATIENT)
Dept: PHYSICAL THERAPY | Facility: CLINIC | Age: 35
End: 2020-10-26
Payer: COMMERCIAL

## 2021-04-06 ENCOUNTER — OFFICE VISIT (OUTPATIENT)
Dept: FAMILY MEDICINE CLINIC | Facility: HOSPITAL | Age: 36
End: 2021-04-06
Payer: COMMERCIAL

## 2021-04-06 VITALS
HEART RATE: 86 BPM | SYSTOLIC BLOOD PRESSURE: 124 MMHG | WEIGHT: 240.4 LBS | BODY MASS INDEX: 38.63 KG/M2 | DIASTOLIC BLOOD PRESSURE: 70 MMHG | HEIGHT: 66 IN | TEMPERATURE: 97.2 F | OXYGEN SATURATION: 100 %

## 2021-04-06 DIAGNOSIS — J45.990 EXERCISE-INDUCED ASTHMA: ICD-10-CM

## 2021-04-06 DIAGNOSIS — Z13.228 SCREENING FOR ENDOCRINE, METABOLIC AND IMMUNITY DISORDER: ICD-10-CM

## 2021-04-06 DIAGNOSIS — Z13.220 SCREENING CHOLESTEROL LEVEL: ICD-10-CM

## 2021-04-06 DIAGNOSIS — Z13.0 SCREENING FOR ENDOCRINE, METABOLIC AND IMMUNITY DISORDER: ICD-10-CM

## 2021-04-06 DIAGNOSIS — Z13.29 SCREENING FOR ENDOCRINE, METABOLIC AND IMMUNITY DISORDER: ICD-10-CM

## 2021-04-06 DIAGNOSIS — Z13.1 SCREENING FOR DIABETES MELLITUS: ICD-10-CM

## 2021-04-06 DIAGNOSIS — Z00.00 ANNUAL PHYSICAL EXAM: Primary | ICD-10-CM

## 2021-04-06 PROBLEM — F41.8 POSTPARTUM ANXIETY: Status: RESOLVED | Noted: 2019-01-23 | Resolved: 2021-04-06

## 2021-04-06 PROBLEM — F33.0 MILD EPISODE OF RECURRENT MAJOR DEPRESSIVE DISORDER (HCC): Status: RESOLVED | Noted: 2018-04-11 | Resolved: 2021-04-06

## 2021-04-06 PROBLEM — S82.62XA AVULSION FRACTURE OF LATERAL MALLEOLUS OF LEFT FIBULA: Status: RESOLVED | Noted: 2020-08-04 | Resolved: 2021-04-06

## 2021-04-06 PROBLEM — M25.572 PAIN, JOINT, ANKLE AND FOOT, LEFT: Status: RESOLVED | Noted: 2020-08-04 | Resolved: 2021-04-06

## 2021-04-06 PROBLEM — L29.9 PRURITUS AND RELATED CONDITIONS: Status: RESOLVED | Noted: 2020-05-27 | Resolved: 2021-04-06

## 2021-04-06 PROBLEM — K62.5 RECTAL BLEEDING: Status: RESOLVED | Noted: 2020-07-20 | Resolved: 2021-04-06

## 2021-04-06 PROCEDURE — 1036F TOBACCO NON-USER: CPT | Performed by: NURSE PRACTITIONER

## 2021-04-06 PROCEDURE — 3008F BODY MASS INDEX DOCD: CPT | Performed by: NURSE PRACTITIONER

## 2021-04-06 PROCEDURE — 3725F SCREEN DEPRESSION PERFORMED: CPT | Performed by: NURSE PRACTITIONER

## 2021-04-06 PROCEDURE — 99395 PREV VISIT EST AGE 18-39: CPT | Performed by: NURSE PRACTITIONER

## 2021-04-06 NOTE — PROGRESS NOTES
Thad Caballero MD    NAME: Elke Moya  AGE: 38 y o  SEX: female  : 1985     DATE: 2021     Assessment and Plan:     Problem List Items Addressed This Visit        Respiratory    Exercise-induced asthma     Occasional use of albuterol  Well controlled  Other Visit Diagnoses     Annual physical exam    -  Primary    Screening cholesterol level        Relevant Orders    Lipid panel    Screening for diabetes mellitus        Relevant Orders    Hemoglobin A1C    Screening for endocrine, metabolic and immunity disorder        Relevant Orders    CBC and differential    Comprehensive metabolic panel    TSH, 3rd generation with Free T4 reflex          Immunizations and preventive care screenings were discussed with patient today  Appropriate education was printed on patient's after visit summary  Counseling:  Alcohol/drug use: discussed moderation in alcohol intake, the recommendations for healthy alcohol use, and avoidance of illicit drug use  Dental Health: discussed importance of regular tooth brushing, flossing, and dental visits  Injury prevention: discussed safety/seat belts, safety helmets, smoke detectors, carbon dioxide detectors, and smoking near bedding or upholstery  Sexual health: discussed sexually transmitted diseases, partner selection, use of condoms, avoidance of unintended pregnancy, and contraceptive alternatives  · Exercise: the importance of regular exercise/physical activity was discussed  Recommend exercise 3-5 times per week for at least 30 minutes  BMI Counseling: Body mass index is 38 8 kg/m²  The BMI is above normal  Nutrition recommendations include decreasing portion sizes, encouraging healthy choices of fruits and vegetables, decreasing fast food intake, consuming healthier snacks, limiting drinks that contain sugar, moderation in carbohydrate intake and increasing intake of lean protein  Exercise recommendations include moderate physical activity 150 minutes/week  No pharmacotherapy was ordered  Return in 1 year (on 2022)  Chief Complaint:     Chief Complaint   Patient presents with    Follow-up      History of Present Illness:     Adult Annual Physical   Patient here for a comprehensive physical exam  The patient reports no problems  Has occasional swelling of ankles and hands  Especially with exercise  Eating healthy  Has lost weight  Stopped antidepressant a long time ago  Mood has been good off  Diet and Physical Activity  · Diet/Nutrition: well balanced diet  · Exercise: walking  Depression Screening  PHQ-9 Depression Screening    PHQ-9:   Frequency of the following problems over the past two weeks:      Little interest or pleasure in doing things: 0 - not at all  Feeling down, depressed, or hopeless: 1 - several days  Trouble falling or staying asleep, or sleeping too much: 0 - not at all  Feeling tired or having little energy: 0 - not at all  Poor appetite or overeatin - not at all  Feeling bad about yourself - or that you are a failure or have let yourself or your family down: 0 - not at all  Trouble concentrating on things, such as reading the newspaper or watching television: 0 - not at all  Moving or speaking so slowly that other people could have noticed  Or the opposite - being so fidgety or restless that you have been moving around a lot more than usual: 0 - not at all  Thoughts that you would be better off dead, or of hurting yourself in some way: 0 - not at all  PHQ-2 Score: 1  PHQ-9 Score: 1       General Health  · Sleep: sleeps well  · Hearing: normal - bilateral   · Vision: most recent eye exam >1 year ago and wears glasses  · Dental: no dental visits for >1 year  /GYN Health  · Last menstrual period: 19  · Contraceptive method: IUD placement  · History of STDs?: no  · UTD with pap-[facility]       Review of Systems: Review of Systems   Constitutional: Negative  HENT: Negative  Negative for congestion, dental problem, ear pain, hearing loss, postnasal drip, rhinorrhea, sinus pressure, sinus pain, sore throat, tinnitus and trouble swallowing  Eyes: Negative  Respiratory: Negative  Cardiovascular: Positive for leg swelling  Negative for chest pain and palpitations  Gastrointestinal: Negative  Endocrine: Negative  Genitourinary: Negative  Musculoskeletal: Negative  Skin: Negative  Allergic/Immunologic: Negative  Neurological: Negative  Psychiatric/Behavioral: Negative         Past Medical History:     Past Medical History:   Diagnosis Date    Avulsion fracture of lateral malleolus of left fibula 8/4/2020    Depression     last assessed 11/25/13    Elevated blood pressure reading     w/o diagnosis of hypertension- Last assessed 09/15/15    Generalized anxiety disorder     last assessed 12/04/14    Herniated lumbar intervertebral disc     last assessed 03/10/14    Lumbar radiculopathy     last assessed 03/10/14    Lumbosacral spondylosis without myelopathy     last assessed 03/10/14    [facility] of [facility] 4[facility]11[facility]    Postpartum anxiety 1/23/2019    Rectal bleeding 7/20/2020      Past Surgical History:     Past Surgical History:   Procedure Laterality Date    BREAST LUMPECTOMY Right     resolved [place] EXTRACTION        Social History:     E-Cigarette/Vaping    E-Cigarette Use Never User      E-Cigarette/Vaping Substances    Nicotine No     THC No     CBD No     Flavoring No     Other No     Unknown No      Social History     Socioeconomic History    Marital status: /Civil Union     Spouse name: Not on file    Number of children: Not on file    Years of education: Not on file    Highest education level: Not on file   Occupational History    Not on file   Social Needs    Financial resource strain: Not on file    Food insecurity     Worry: Not on file     Inability: Not on file    Transportation needs     Medical: Not on file     Non-medical: Not on file   Tobacco Use    Smoking status: Never Smoker    Smokeless tobacco: Never Used   Substance and Sexual Activity    Alcohol use: Yes     Comment: social    Drug use: Never    Sexual activity: Not on file   Lifestyle    Physical activity     Days per week: Not on file     Minutes per session: Not on file    Stress: Not on file   Relationships    Social connections     Talks on phone: Not on file     Gets together: Not on file     Attends [employer] service: Not on file     Active member of club or organization: Not on file     Attends meetings of clubs or organizations: Not on file     Relationship status: Not on file    Intimate partner violence     Fear of current or ex partner: Not on file     Emotionally abused: Not on file     Physically abused: Not on file     Forced sexual activity: Not on file   Other Topics Concern    Not on file   Social History Narrative    Not on file      Family History:     Family History   Problem Relation Age of Onset    Alcohol abuse Mother     Substance Abuse Mother     Mental illness Mother     Alcohol abuse Father     Substance Abuse Father     Mental illness Father     Ovarian cancer Family     Colon cancer Neg Hx       Current Medications:     Current Outpatient Medications   Medication Sig Dispense Refill    cetirizine (ZyrTEC) 10 mg tablet Take 10 mg by mouth daily      Cyanocobalamin (B-12) 1000 MCG CAPS       Fluticasone Propionate (FLONASE ALLERGY RELIEF NA)       Multiple Vitamin (MULTIVITAMIN) tablet Take 1 tablet by mouth daily      albuterol (VENTOLIN HFA) 90 mcg/act inhaler Inhale 1-2 puffs every 6 (six) hours as needed for wheezing (Patient not taking: Reported on 4/6/2021) 1 Inhaler 0     No current facility-administered medications for this visit  Allergies:      Allergies Allergen Reactions    Morphine And Related     Oxycodone-Acetaminophen       Physical Exam:     /70 (BP Location: Left arm, Patient Position: Sitting, Cuff Size: Standard)   Pulse 86   Temp (!) 97 2 °F (36 2 °C) (Tympanic)   Ht 5' 6" (1 676 m)   Wt 109 kg (240 lb 6 4 oz)   SpO2 100%   BMI 38 80 kg/m²     Physical Exam  Vitals signs reviewed  Constitutional:       Appearance: Normal appearance  She is normal weight  HENT:      Head: Normocephalic and atraumatic  Right Ear: Tympanic membrane, ear canal and external ear normal       Left Ear: Tympanic membrane, ear canal and external ear normal       Nose: Nose normal       Mouth/Throat:      Mouth: Mucous membranes are moist       Pharynx: Oropharynx is clear  Eyes:      Conjunctiva/sclera: Conjunctivae normal       Pupils: Pupils are equal, round, and reactive to light  Neck:      Musculoskeletal: Normal range of motion and neck supple  Cardiovascular:      Rate and Rhythm: Normal rate and regular rhythm  Heart sounds: Normal heart sounds  No murmur  Pulmonary:      Effort: Pulmonary effort is normal       Breath sounds: Normal breath sounds  Abdominal:      General: Abdomen is flat  Bowel sounds are normal       Palpations: Abdomen is soft  There is no hepatomegaly or splenomegaly  Tenderness: There is no abdominal tenderness  Musculoskeletal: Normal range of motion  Skin:     General: Skin is warm and dry  Capillary Refill: Capillary refill takes less than 2 seconds  Neurological:      General: No focal deficit present  Mental Status: She is alert and oriented to person, place, and time  Psychiatric:         Mood and Affect: Mood normal          Behavior: Behavior normal          Thought Content:  Thought content normal          Judgment: Judgment normal           Sukhdeep Hewitt MD

## 2021-04-06 NOTE — PATIENT INSTRUCTIONS
Wellness Visit for Adults   AMBULATORY CARE:   A wellness visit  is when you see your healthcare provider to get screened for health problems  Your healthcare provider will also give you advice on how to stay healthy  Write down your questions so you remember to ask them  Ask your healthcare provider how often you should have a wellness visit  What happens at a wellness visit:  Your healthcare provider will ask about your health, and your family history of health problems  This includes high blood pressure, heart disease, and cancer  He or she will ask if you have symptoms that concern you, if you smoke, and about your mood  You may also be asked about your intake of medicines, supplements, food, and alcohol  Any of the following may be done:  · Your weight  will be checked  Your height may also be checked so your body mass index (BMI) can be calculated  Your BMI shows if you are at a healthy weight  · Your blood pressure  and heart rate will be checked  Your temperature may also be checked  · Blood and urine tests  may be done  Blood tests may be done to check your cholesterol levels  Abnormal cholesterol levels increase your risk for heart disease and stroke  You may also need a blood or urine test to check for diabetes if you are at increased risk  Urine tests may be done to look for signs of an infection or kidney disease  · A physical exam  includes checking your heartbeat and lungs with a stethoscope  Your healthcare provider may also check your skin to look for sun damage  · Screening tests  may be recommended  A screening test is done to check for diseases that may not cause symptoms  The screening tests you may need depend on your age, gender, family history, and lifestyle habits  For example, colorectal screening may be recommended if you are 48years old or older  Screening tests you need if you are a woman:   · A Pap smear  is used to screen for cervical cancer   Pap smears are usually done every 3 to 5 years depending on your age  You may need them more often if you have had abnormal Pap smear test results in the past  Ask your healthcare provider how often you should have a Pap smear  · A mammogram  is an x-ray of your breasts to screen for breast cancer  Experts recommend mammograms every 2 years starting at age 48 years  You may need a mammogram at age 52 years or younger if you have an increased risk for breast cancer  Talk to your healthcare provider about when you should start having mammograms and how often you need them  Vaccines you may need:   · Get an influenza vaccine  every year  The influenza vaccine protects you from the flu  Several types of viruses cause the flu  The viruses change over time, so new vaccines are made each year  · Get a tetanus-diphtheria (Td) booster vaccine  every 10 years  This vaccine protects you against tetanus and diphtheria  Tetanus is a severe infection that may cause painful muscle spasms and lockjaw  Diphtheria is a severe bacterial infection that causes a thick covering in the back of your mouth and throat  · Get a human papillomavirus (HPV) vaccine  if you are female and aged 23 to 32 or male 23 to 24 and never received it  This vaccine protects you from HPV infection  HPV is the most common infection spread by sexual contact  HPV may also cause vaginal, penile, and anal cancers  · Get a pneumococcal vaccine  if you are aged 72 years or older  The pneumococcal vaccine is an injection given to protect you from pneumococcal disease  Pneumococcal disease is an infection caused by pneumococcal bacteria  The infection may cause pneumonia, meningitis, or an ear infection  · Get a shingles vaccine  if you are 60 or older, even if you have had shingles before  The shingles vaccine is an injection to protect you from the varicella-zoster virus  This is the same virus that causes chickenpox   Shingles is a painful rash that develops in people who had chickenpox or have been exposed to the virus  How to eat healthy:  My Plate is a model for planning healthy meals  It shows the types and amounts of foods that should go on your plate  Fruits and vegetables make up about half of your plate, and grains and protein make up the other half  A serving of dairy is included on the side of your plate  The amount of calories and serving sizes you need depends on your age, gender, weight, and height  Examples of healthy foods are listed below:  · Eat a variety of vegetables  such as dark green, red, and orange vegetables  You can also include canned vegetables low in sodium (salt) and frozen vegetables without added butter or sauces  · Eat a variety of fresh fruits , canned fruit in 100% juice, frozen fruit, and dried fruit  · Include whole grains  At least half of the grains you eat should be whole grains  Examples include whole-wheat bread, wheat pasta, brown rice, and whole-grain cereals such as oatmeal     · Eat a variety of protein foods such as seafood (fish and shellfish), lean meat, and poultry without skin (turkey and chicken)  Examples of lean meats include pork leg, shoulder, or tenderloin, and beef round, sirloin, tenderloin, and extra lean ground beef  Other protein foods include eggs and egg substitutes, beans, peas, soy products, nuts, and seeds  · Choose low-fat dairy products such as skim or 1% milk or low-fat yogurt, cheese, and cottage cheese  · Limit unhealthy fats  such as butter, hard margarine, and shortening  Exercise:  Exercise at least 30 minutes per day on most days of the week  Some examples of exercise include walking, biking, dancing, and swimming  You can also fit in more physical activity by taking the stairs instead of the elevator or parking farther away from stores  Include muscle strengthening activities 2 days each week  Regular exercise provides many health benefits   It helps you manage your weight, and decreases your risk for type 2 diabetes, heart disease, stroke, and high blood pressure  Exercise can also help improve your mood  Ask your healthcare provider about the best exercise plan for you  General health and safety guidelines:   · Do not smoke  Nicotine and other chemicals in cigarettes and cigars can cause lung damage  Ask your healthcare provider for information if you currently smoke and need help to quit  E-cigarettes or smokeless tobacco still contain nicotine  Talk to your healthcare provider before you use these products  · Limit alcohol  A drink of alcohol is 12 ounces of beer, 5 ounces of wine, or 1½ ounces of liquor  · Lose weight, if needed  Being overweight increases your risk of certain health conditions  These include heart disease, high blood pressure, type 2 diabetes, and certain types of cancer  · Protect your skin  Do not sunbathe or use tanning beds  Use sunscreen with a SPF 15 or higher  Apply sunscreen at least 15 minutes before you go outside  Reapply sunscreen every 2 hours  Wear protective clothing, hats, and sunglasses when you are outside  · Drive safely  Always wear your seatbelt  Make sure everyone in your car wears a seatbelt  A seatbelt can save your life if you are in an accident  Do not use your cell phone when you are driving  This could distract you and cause an accident  Pull over if you need to make a call or send a text message  · Practice safe sex  Use latex condoms if are sexually active and have more than one partner  Your healthcare provider may recommend screening tests for sexually transmitted infections (STIs)  · Wear helmets, lifejackets, and protective gear  Always wear a helmet when you ride a bike or motorcycle, go skiing, or play sports that could cause a head injury  Wear protective equipment when you play sports  Wear a lifejacket when you are on a boat or doing water sports      © Copyright [employer] 2020 Information is for End User's use only and may not be sold, redistributed or otherwise used for commercial purposes  All illustrations and images included in CareNotes® are the copyrighted property of A D A M , Inc  or Tito Calderón  The above information is an  only  It is not intended as medical advice for individual conditions or treatments  Talk to your doctor, nurse or pharmacist before following any medical regimen to see if it is safe and effective for you  Obesity   AMBULATORY CARE:   Obesity  is when your body mass index (BMI) is greater than 30  Your healthcare provider will use your height and weight to measure your BMI  The risks of obesity include  many health problems, such as injuries or physical disability  You may need tests to check for the following:  · Diabetes    · High blood pressure or high cholesterol    · Heart disease    · Gallbladder or liver disease    · Cancer of the colon, breast, prostate, liver, or kidney    · Sleep apnea    · Arthritis or gout    Seek care immediately if:   · You have a severe headache, confusion, or difficulty speaking  · You have weakness on one side of your body  · You have chest pain, sweating, or shortness of breath  Contact your healthcare provider if:   · You have symptoms of gallbladder or liver disease, such as pain in your upper abdomen  · You have knee or hip pain and discomfort while walking  · You have symptoms of diabetes, such as intense hunger and thirst, and frequent urination  · You have symptoms of sleep apnea, such as snoring or daytime sleepiness  · You have questions or concerns about your condition or care  Treatment for obesity  focuses on helping you lose weight to improve your health  Even a small decrease in BMI can reduce the risk for many health problems  Your healthcare provider will help you set a weight-loss goal   · Lifestyle changes  are the first step in treating obesity   These include making healthy food choices and getting regular physical activity  Your healthcare provider may suggest a weight-loss program that involves coaching, education, and therapy  · Medicine  may help you lose weight when it is used with a healthy diet and physical activity  · Surgery  can help you lose weight if you are very obese and have other health problems  There are several types of weight-loss surgery  Ask your healthcare provider for more information  Be successful losing weight:   · Set small, realistic goals  An example of a small goal is to walk for 20 minutes 5 days a week  Anther goal is to lose 5% of your body weight  · Tell friends, family members, and coworkers about your goals  and ask for their support  Ask a friend to lose weight with you, or join a weight-loss support group  · Identify foods or triggers that may cause you to overeat , and find ways to avoid them  Remove tempting high-calorie foods from your home and workplace  Place a bowl of fresh fruit on your kitchen counter  If stress causes you to eat, then find other ways to cope with stress  · Keep a diary to track what you eat and drink  Also write down how many minutes of physical activity you do each day  Weigh yourself once a week and record it in your diary  Eating changes: You will need to eat 500 to 1,000 fewer calories each day than you currently eat to lose 1 to 2 pounds a week  The following changes will help you cut calories:  · Eat smaller portions  Use small plates, no larger than 9 inches in diameter  Fill your plate half full of fruits and vegetables  Measure your food using measuring cups until you know what a serving size looks like  · Eat 3 meals and 1 or 2 snacks each day  Plan your meals in advance  Nikki Scott and eat at home most of the time  Eat slowly  Do not skip meals  Skipping meals can lead to overeating later in the day  This can make it harder for you to lose weight   Talk with a dietitian to help you make a meal plan and schedule that is right for you  · Eat fruits and vegetables at every meal   They are low in calories and high in fiber, which makes you feel full  Do not add butter, margarine, or cream sauce to vegetables  Use herbs to season steamed vegetables  · Eat less fat and fewer fried foods  Eat more baked or grilled chicken and fish  These protein sources are lower in calories and fat than red meat  Limit fast food  Dress your salads with olive oil and vinegar instead of bottled dressing  · Limit the amount of sugar you eat  Do not drink sugary beverages  Limit alcohol  Activity changes:  Physical activity is good for your body in many ways  It helps you burn calories and build strong muscles  It decreases stress and depression, and improves your mood  It can also help you sleep better  Talk to your healthcare provider before you begin an exercise program   · Exercise for at least 30 minutes 5 days a week  Start slowly  Set aside time each day for physical activity that you enjoy and that is convenient for you  It is best to do both weight training and an activity that increases your heart rate, such as walking, bicycling, or swimming  · Find ways to be more active  Do yard work and housecleaning  Walk up the stairs instead of using elevators  Spend your leisure time going to events that require walking, such as outdoor festivals or fairs  This extra physical activity can help you lose weight and keep it off  Follow up with your healthcare provider as directed: You may need to meet with a dietitian  Write down your questions so you remember to ask them during your visits  © Copyright [address] Information is for End User's use only and may not be sold, redistributed or otherwise used for commercial purposes   All illustrations and images included in CareNotes® are the copyrighted property of A D A M , Inc  or [facility] Khadra Guy   The above information is an  only  It is not intended as medical advice for individual conditions or treatments  Talk to your doctor, nurse or pharmacist before following any medical regimen to see if it is safe and effective for you

## 2021-07-11 ENCOUNTER — NURSE TRIAGE (OUTPATIENT)
Dept: OTHER | Facility: OTHER | Age: 36
End: 2021-07-11

## 2021-07-11 DIAGNOSIS — J45.990 EXERCISE-INDUCED ASTHMA: ICD-10-CM

## 2021-07-11 RX ORDER — ALBUTEROL SULFATE 90 UG/1
1-2 AEROSOL, METERED RESPIRATORY (INHALATION) EVERY 6 HOURS PRN
Refills: 0 | Status: CANCELLED | OUTPATIENT
Start: 2021-07-11

## 2021-07-11 RX ORDER — ALBUTEROL SULFATE 90 UG/1
1-2 AEROSOL, METERED RESPIRATORY (INHALATION) EVERY 6 HOURS PRN
Qty: 18 G | Refills: 0 | Status: SHIPPED | OUTPATIENT
Start: 2021-07-11

## 2021-07-11 NOTE — TELEPHONE ENCOUNTER
Reason for Disposition   [1] Caller requesting a prescription renewal (no refills left), no triage required, AND [2] triager able to renew prescription per department policy    Answer Assessment - Initial Assessment Questions  1  NAME of MEDICATION: "What medicine are you calling about?"      Albuterol inhaler    2  QUESTION: "What is your question?" (e g , medication refill, side effect)     Medication refill    3  PRESCRIBING HCP: "Who prescribed it?" Reason: if prescribed by specialist, call should be referred to that group  Karri velarde    4  SYMPTOMS: "Do you have any symptoms?"      wheezing  5   SEVERITY: If symptoms are present, ask "Are they mild, moderate or severe?"   mild    Protocols used: MEDICATION QUESTION CALL-ADULT-

## 2021-07-11 NOTE — TELEPHONE ENCOUNTER
Regarding: Refill Albuterol  ----- Message from Aravind Banuelos sent at 7/11/2021 12:00 PM EDT -----  "I went to use my albuterol (VENTOLIN HFA) 90 mcg/act inhaler 1-2 puff, Every 6 hours PRN and I did not have any, I would feel more comfortable if I had some "

## 2021-08-01 ENCOUNTER — HOSPITAL ENCOUNTER (EMERGENCY)
Facility: HOSPITAL | Age: 36
Discharge: HOME | End: 2021-08-01
Attending: EMERGENCY MEDICINE
Payer: COMMERCIAL

## 2021-08-01 ENCOUNTER — APPOINTMENT (EMERGENCY)
Dept: RADIOLOGY | Facility: HOSPITAL | Age: 36
End: 2021-08-01
Payer: COMMERCIAL

## 2021-08-01 VITALS
TEMPERATURE: 99 F | HEIGHT: 66 IN | DIASTOLIC BLOOD PRESSURE: 74 MMHG | SYSTOLIC BLOOD PRESSURE: 143 MMHG | HEART RATE: 76 BPM | RESPIRATION RATE: 16 BRPM | OXYGEN SATURATION: 99 % | BODY MASS INDEX: 38.73 KG/M2 | WEIGHT: 241 LBS

## 2021-08-01 DIAGNOSIS — R12 HEARTBURN: Primary | ICD-10-CM

## 2021-08-01 DIAGNOSIS — F41.0 PANIC ATTACK: ICD-10-CM

## 2021-08-01 LAB
ALBUMIN SERPL BCP-MCNC: 3.8 G/DL (ref 3.5–5)
ALP SERPL-CCNC: 90 U/L (ref 46–116)
ALT SERPL W P-5'-P-CCNC: 44 U/L (ref 12–78)
ANION GAP SERPL CALCULATED.3IONS-SCNC: 12 MMOL/L (ref 4–13)
AST SERPL W P-5'-P-CCNC: 21 U/L (ref 5–45)
BASOPHILS # BLD AUTO: 0.04 THOUSANDS/ΜL (ref 0–0.1)
BASOPHILS NFR BLD AUTO: 1 % (ref 0–1)
BILIRUB SERPL-MCNC: 0.2 MG/DL (ref 0.2–1)
BUN SERPL-MCNC: 12 MG/DL (ref 5–25)
CALCIUM SERPL-MCNC: 9 MG/DL (ref 8.3–10.1)
CHLORIDE SERPL-SCNC: 106 MMOL/L (ref 100–108)
CO2 SERPL-SCNC: 23 MMOL/L (ref 21–32)
CREAT SERPL-MCNC: 0.8 MG/DL (ref 0.6–1.3)
EOSINOPHIL # BLD AUTO: 0.35 THOUSAND/ΜL (ref 0–0.61)
EOSINOPHIL NFR BLD AUTO: 4 % (ref 0–6)
ERYTHROCYTE [DISTWIDTH] IN BLOOD BY AUTOMATED COUNT: 13.9 % (ref 11.6–15.1)
GFR SERPL CREATININE-BSD FRML MDRD: 94 ML/MIN/1.73SQ M
GLUCOSE SERPL-MCNC: 112 MG/DL (ref 65–140)
HCT VFR BLD AUTO: 43.5 % (ref 34.8–46.1)
HGB BLD-MCNC: 13.9 G/DL (ref 11.5–15.4)
IMM GRANULOCYTES # BLD AUTO: 0.03 THOUSAND/UL (ref 0–0.2)
IMM GRANULOCYTES NFR BLD AUTO: 0 % (ref 0–2)
LYMPHOCYTES # BLD AUTO: 2.15 THOUSANDS/ΜL (ref 0.6–4.47)
LYMPHOCYTES NFR BLD AUTO: 25 % (ref 14–44)
MCH RBC QN AUTO: 26.6 PG (ref 26.8–34.3)
MCHC RBC AUTO-ENTMCNC: 32 G/DL (ref 31.4–37.4)
MCV RBC AUTO: 83 FL (ref 82–98)
MONOCYTES # BLD AUTO: 0.54 THOUSAND/ΜL (ref 0.17–1.22)
MONOCYTES NFR BLD AUTO: 6 % (ref 4–12)
NEUTROPHILS # BLD AUTO: 5.44 THOUSANDS/ΜL (ref 1.85–7.62)
NEUTS SEG NFR BLD AUTO: 64 % (ref 43–75)
NRBC BLD AUTO-RTO: 0 /100 WBCS
PLATELET # BLD AUTO: 318 THOUSANDS/UL (ref 149–390)
PMV BLD AUTO: 10.4 FL (ref 8.9–12.7)
POTASSIUM SERPL-SCNC: 3.5 MMOL/L (ref 3.5–5.3)
PROT SERPL-MCNC: 7.7 G/DL (ref 6.4–8.2)
RBC # BLD AUTO: 5.22 MILLION/UL (ref 3.81–5.12)
SODIUM SERPL-SCNC: 141 MMOL/L (ref 136–145)
TROPONIN I SERPL-MCNC: <0.02 NG/ML
TSH SERPL DL<=0.05 MIU/L-ACNC: 1.43 UIU/ML (ref 0.36–3.74)
WBC # BLD AUTO: 8.55 THOUSAND/UL (ref 4.31–10.16)

## 2021-08-01 PROCEDURE — 71045 X-RAY EXAM CHEST 1 VIEW: CPT

## 2021-08-01 PROCEDURE — 99285 EMERGENCY DEPT VISIT HI MDM: CPT | Performed by: EMERGENCY MEDICINE

## 2021-08-01 PROCEDURE — 96374 THER/PROPH/DIAG INJ IV PUSH: CPT

## 2021-08-01 PROCEDURE — 96375 TX/PRO/DX INJ NEW DRUG ADDON: CPT

## 2021-08-01 PROCEDURE — 80053 COMPREHEN METABOLIC PANEL: CPT | Performed by: EMERGENCY MEDICINE

## 2021-08-01 PROCEDURE — 84443 ASSAY THYROID STIM HORMONE: CPT | Performed by: EMERGENCY MEDICINE

## 2021-08-01 PROCEDURE — 93005 ELECTROCARDIOGRAM TRACING: CPT

## 2021-08-01 PROCEDURE — 85025 COMPLETE CBC W/AUTO DIFF WBC: CPT | Performed by: EMERGENCY MEDICINE

## 2021-08-01 PROCEDURE — 99285 EMERGENCY DEPT VISIT HI MDM: CPT

## 2021-08-01 PROCEDURE — 84484 ASSAY OF TROPONIN QUANT: CPT | Performed by: EMERGENCY MEDICINE

## 2021-08-01 PROCEDURE — 36415 COLL VENOUS BLD VENIPUNCTURE: CPT | Performed by: EMERGENCY MEDICINE

## 2021-08-01 RX ORDER — DIPHENHYDRAMINE HYDROCHLORIDE 50 MG/ML
25 INJECTION INTRAMUSCULAR; INTRAVENOUS ONCE
Status: COMPLETED | OUTPATIENT
Start: 2021-08-01 | End: 2021-08-01

## 2021-08-01 RX ADMIN — DIPHENHYDRAMINE HYDROCHLORIDE 25 MG: 50 INJECTION, SOLUTION INTRAMUSCULAR; INTRAVENOUS at 16:04

## 2021-08-01 RX ADMIN — FAMOTIDINE 20 MG: 10 INJECTION INTRAVENOUS at 16:04

## 2021-08-01 NOTE — ED NOTES
Pt reports she is feeling better; easier to breathe, lump in throat has decreased and she is burping more        Mynor Fam RN  08/01/21 5115

## 2021-08-01 NOTE — ED PROVIDER NOTES
History  Chief Complaint   Patient presents with    Shortness of Breath     Pt reports SOB that started an "hour to 1/2 hr ago"  Pt reports of heart burn as well since April that comes and goes, happens mostly at night  75-year-old female past medical history depression asthma patient has a nonhormonal IUD  She came in with concern for gradual labored breathing couple hours ago started while she was cleaning the house  She does use a mask because of the mold  She had a sensation of lumps in her throat and discomfort in the epigastric area that radiated up into the throat  For she tried Prilosec which she has been taking lately she is concerned for GI bleeding  Then she tried albuterol inhaler  Neither of those helped  she does admit to having 2 panic attacks in the past but they were triggered by some stressors  She even tried having her sister talk her down on the phone but that did not work  She has no current stress she just got back from a trip to [facility] yesterday  Denies fevers, cough, pain  Prior to Admission Medications   Prescriptions Last Dose Informant Patient Reported? Taking?    Cyanocobalamin (B-12) 1000 MCG CAPS  Self Yes No   Fluticasone Propionate (FLONASE ALLERGY RELIEF NA)  Self Yes No   Multiple Vitamin (MULTIVITAMIN) tablet  Self Yes No   Sig: Take 1 tablet by mouth daily   albuterol (Ventolin HFA) 90 mcg/act inhaler   No No   Sig: Inhale 1-2 puffs every 6 (six) hours as needed for wheezing   cetirizine (ZyrTEC) 10 mg tablet  Self Yes No   Sig: Take 10 mg by mouth daily      Facility-Administered Medications: None       Past Medical History:   Diagnosis Date    Asthma     Avulsion fracture of lateral malleolus of left fibula 8/4/2020    Depression     last assessed 11/25/13    Elevated blood pressure reading     w/o diagnosis of hypertension- Last assessed 09/15/15    Generalized anxiety disorder     last assessed 12/04/14    Herniated lumbar intervertebral disc     last assessed 03/10/14    Lumbar radiculopathy     last assessed 03/10/14    Lumbosacral spondylosis without myelopathy     last assessed 03/10/14    Mild episode of recurrent major depressive disorder ([facility] [address] ) 2018    Postpartum anxiety 2019    Rectal bleeding 2020       Past Surgical History:   Procedure Laterality Date    BREAST LUMPECTOMY Right     resolved 1997     SECTION      TOOTH EXTRACTION         Family History   Problem Relation Age of Onset    Alcohol abuse Mother     Substance Abuse Mother     Mental illness Mother     Alcohol abuse Father     Substance Abuse Father     Mental illness Father     Ovarian cancer Family     Colon cancer Neg Hx      I have reviewed and agree with the history as documented  E-Cigarette/Vaping    E-Cigarette Use Never User      E-Cigarette/Vaping Substances    Nicotine No     THC No     CBD No     Flavoring No     Other No     Unknown No      Social History     Tobacco Use    Smoking status: Never Smoker    Smokeless tobacco: Never Used   Vaping Use    Vaping Use: Never used   Substance Use Topics    Alcohol use: Yes     Comment: social    Drug use: Never       Review of Systems   Constitutional: Negative for chills and fever  HENT: Negative for rhinorrhea and sore throat  Respiratory: Negative for shortness of breath  Cardiovascular: Negative for chest pain  Gastrointestinal: Negative for constipation, diarrhea, nausea and vomiting  Genitourinary: Negative for dysuria and frequency  Skin: Negative for rash  Psychiatric/Behavioral: Negative for suicidal ideas  The patient is nervous/anxious  All other systems reviewed and are negative  Physical Exam  Physical Exam  Vitals and nursing note reviewed  Constitutional:       Appearance: She is well-developed  HENT:      Head: Normocephalic and atraumatic  Jaw: No trismus        Right Ear: External ear normal       Left Ear: External ear normal       Nose: Nose normal       Mouth/Throat:      Mouth: Mucous membranes are moist       Pharynx: Oropharynx is clear  No pharyngeal swelling  Eyes:      Conjunctiva/sclera: Conjunctivae normal       Pupils: Pupils are equal, round, and reactive to light  Neck:      Thyroid: No thyromegaly  Cardiovascular:      Rate and Rhythm: Normal rate and regular rhythm  Heart sounds: Normal heart sounds  Pulmonary:      Effort: Pulmonary effort is normal  No respiratory distress  Breath sounds: Normal breath sounds  No wheezing  Abdominal:      General: Bowel sounds are normal  There is no distension  Palpations: Abdomen is soft  Tenderness: There is no abdominal tenderness  Musculoskeletal:         General: No deformity  Normal range of motion  Cervical back: Full passive range of motion without pain, normal range of motion and neck supple  No edema, erythema or rigidity  No spinous process tenderness  Right lower leg: No edema  Left lower leg: No edema  Lymphadenopathy:      Cervical: No cervical adenopathy  Skin:     General: Skin is warm and dry  Findings: No rash  Neurological:      General: No focal deficit present  Mental Status: She is alert and oriented to person, place, and time  GCS: GCS eye subscore is 4  GCS verbal subscore is 5  GCS motor subscore is 6  Sensory: No sensory deficit  Psychiatric:         Mood and Affect: Mood is anxious  Speech: Speech normal          Behavior: Behavior normal          Thought Content: Thought content does not include suicidal ideation           Vital Signs  ED Triage Vitals [08/01/21 1544]   Temperature Pulse Respirations Blood Pressure SpO2   99 °F (37 2 °C) 104 16 150/87 98 %      Temp Source Heart Rate Source Patient Position - Orthostatic VS BP Location FiO2 (%)   Oral Monitor Lying Left arm --      Pain Score       --           Vitals:    08/01/21 1544 08/01/21 1630 08/01/21 1653   BP: 150/87 128/79 143/74   Pulse: 104 81 76   Patient Position - Orthostatic VS: Lying Lying Lying         Visual Acuity      ED Medications  Medications   diphenhydrAMINE (BENADRYL) injection 25 mg (25 mg Intravenous Given 8/1/21 1604)   famotidine (PEPCID) injection 20 mg (20 mg Intravenous Given 8/1/21 1604)       Diagnostic Studies  Results Reviewed     Procedure Component Value Units Date/Time    TSH, 3rd generation with Free T4 reflex [[id number]]  (Normal) Collected: 08/01/21 1601    Lab Status: Final result Specimen: Blood from Arm, Right Updated: 08/01/21 1648     TSH 3RD GENERATON 1 434 uIU/mL     Narrative:      Patients undergoing fluorescein dye angiography may retain small amounts of fluorescein in the body for 48-72 hours post procedure  Samples containing fluorescein can produce falsely depressed TSH values  If the patient had this procedure,a specimen should be resubmitted post fluorescein clearance        Troponin I [[id number]]  (Normal) Collected: 08/01/21 1601    Lab Status: Final result Specimen: Blood from Arm, Right Updated: 08/01/21 1643     Troponin I <0 02 ng/mL     Comprehensive metabolic panel [[id number]] Collected: 08/01/21 1601    Lab Status: Final result Specimen: Blood from Arm, Right Updated: 08/01/21 1640     Sodium 141 mmol/L      Potassium 3 5 mmol/L      Chloride 106 mmol/L      CO2 23 mmol/L      ANION GAP 12 mmol/L      BUN 12 mg/dL      Creatinine 0 80 mg/dL      Glucose 112 mg/dL      Calcium 9 0 mg/dL      AST 21 U/L      ALT 44 U/L      Alkaline Phosphatase 90 U/L      Total Protein 7 7 g/dL      Albumin 3 8 g/dL      Total Bilirubin 0 20 mg/dL      eGFR 94 ml/min/1 73sq m     Narrative:      [place] guidelines for Chronic Kidney Disease (CKD):     Stage 1 with normal or high GFR (GFR > 90 mL/min/1 73 square meters)    Stage 2 Mild CKD (GFR = 60-89 mL/min/1 73 square meters)    Stage 3A Moderate CKD (GFR = 45-59 mL/min/1 73 square meters)    Stage 3B Moderate CKD (GFR = 30-44 mL/min/1 73 square meters)    Stage 4 Severe CKD (GFR = 15-29 mL/min/1 73 square meters)    Stage 5 End Stage CKD (GFR <15 mL/min/1 73 square meters)  Note: GFR calculation is accurate only with a steady state creatinine    CBC and differential [[id number]]  (Abnormal) Collected: 08/01/21 1601    Lab Status: Final result Specimen: Blood from Arm, Right Updated: 08/01/21 1615     WBC 8 55 Thousand/uL      RBC 5 22 Million/uL      Hemoglobin 13 9 g/dL      Hematocrit 43 5 %      MCV 83 fL      MCH 26 6 pg      MCHC 32 0 g/dL      RDW 13 9 %      MPV 10 4 fL      Platelets 461 Thousands/uL      nRBC 0 /100 WBCs      Neutrophils Relative 64 %      Immat GRANS % 0 %      Lymphocytes Relative 25 %      Monocytes Relative 6 %      Eosinophils Relative 4 %      Basophils Relative 1 %      Neutrophils Absolute 5 44 Thousands/µL      Immature Grans Absolute 0 03 Thousand/uL      Lymphocytes Absolute 2 15 Thousands/µL      Monocytes Absolute 0 54 Thousand/µL      Eosinophils Absolute 0 35 Thousand/µL      Basophils Absolute 0 04 Thousands/µL                  XR chest 1 view portable   ED Interpretation by Lady Iliana DO (08/01 1627)   No acute abnl                 Procedures  ECG 12 Lead Documentation Only    Date/Time: 8/1/2021 3:58 PM  Performed by: Lady Iliana DO  Authorized by: Lady Iliana DO     Indications / Diagnosis:  Sob  ECG reviewed by me, the ED Provider: yes    Patient location:  ED  Previous ECG:     Previous ECG:  Unavailable  Interpretation:     Interpretation: normal    Rate:     ECG rate:  105    ECG rate assessment: tachycardic    Rhythm:     Rhythm: sinus tachycardia    Ectopy:     Ectopy: none    QRS:     QRS axis:  Normal    QRS intervals:  Normal  Conduction:     Conduction: normal    ST segments:     ST segments:  Normal  T waves:     T waves: normal               ED Course                             SBIRT 20yo+      Most Recent Value   SBIRT (24 yo +)   In order to provide better care to our patients, we are screening all of our patients for alcohol and drug use  Would it be okay to ask you these screening questions? No Filed at: 08/01/2021 1545                    [facility]  Number of Diagnoses or Management Options  Heartburn: established and worsening  Panic attack: new and requires workup     Amount and/or Complexity of Data Reviewed  Clinical lab tests: ordered and reviewed  Tests in the radiology section of CPT®: ordered and reviewed    Patient Progress  Patient progress: improved      Disposition  Final diagnoses:   Heartburn   Panic attack     Time reflects when diagnosis was documented in both MDM as applicable and the Disposition within this note     Time User Action Codes Description Comment    8/1/2021  4:49 PM Verna Bosworth Add [R12] Heartburn     8/1/2021  4:49 PM Peyton Bosworth Add [F41 0] Panic attack       ED Disposition     ED Disposition Condition Date/Time Comment    Discharge Stable Sun Aug 1, 2021  4:49 PM Elke Moya discharge to home/self care              Follow-up Information     Follow up With Specialties Details Why Contact Info Additional Information    [facility], [facility] Call   [facility]  [facility] [id number] [phone] [facility], [address], [address] [place] [address], [address] [place] [address], [id number] [phone]          Discharge Medication List as of 8/1/2021  4:50 PM      CONTINUE these medications which have NOT CHANGED    Details   albuterol (Ventolin HFA) 90 mcg/act inhaler Inhale 1-2 puffs every 6 (six) hours as needed for wheezing, Starting Sun 7/11/2021, Normal      cetirizine (ZyrTEC) 10 mg tablet Take 10 mg by mouth daily, Historical Med      Cyanocobalamin (B-12) 1000 MCG CAPS Starting Wed 1/3/2018, Historical Med      Fluticasone Propionate (FLONASE ALLERGY RELIEF NA) Starting Mon 3/19/2018, Historical Med Multiple Vitamin (MULTIVITAMIN) tablet Take 1 tablet by mouth daily, Historical Med           No discharge procedures on file      PDMP Review     None          ED Provider  Electronically Signed by           Shaina Cunningham DO  08/01/21 3256

## 2021-08-02 LAB
ATRIAL RATE: 105 BPM
P AXIS: 61 DEGREES
PR INTERVAL: 146 MS
QRS AXIS: 72 DEGREES
QRSD INTERVAL: 78 MS
QT INTERVAL: 344 MS
QTC INTERVAL: 454 MS
T WAVE AXIS: 57 DEGREES
VENTRICULAR RATE: 105 BPM

## 2021-08-02 PROCEDURE — 93010 ELECTROCARDIOGRAM REPORT: CPT | Performed by: INTERNAL MEDICINE

## 2021-08-03 ENCOUNTER — OFFICE VISIT (OUTPATIENT)
Dept: FAMILY MEDICINE CLINIC | Facility: HOSPITAL | Age: 36
End: 2021-08-03
Payer: COMMERCIAL

## 2021-08-03 VITALS
HEIGHT: 66 IN | BODY MASS INDEX: 37.77 KG/M2 | HEART RATE: 88 BPM | SYSTOLIC BLOOD PRESSURE: 138 MMHG | WEIGHT: 235 LBS | DIASTOLIC BLOOD PRESSURE: 82 MMHG | OXYGEN SATURATION: 98 % | RESPIRATION RATE: 16 BRPM

## 2021-08-03 DIAGNOSIS — K21.9 GASTRIC REFLUX SYNDROME: Primary | ICD-10-CM

## 2021-08-03 DIAGNOSIS — K44.9 HIATAL HERNIA: ICD-10-CM

## 2021-08-03 PROCEDURE — 99213 OFFICE O/P EST LOW 20 MIN: CPT | Performed by: STUDENT IN AN ORGANIZED HEALTH CARE EDUCATION/TRAINING PROGRAM

## 2021-08-03 PROCEDURE — 1036F TOBACCO NON-USER: CPT | Performed by: STUDENT IN AN ORGANIZED HEALTH CARE EDUCATION/TRAINING PROGRAM

## 2021-08-03 PROCEDURE — 3008F BODY MASS INDEX DOCD: CPT | Performed by: STUDENT IN AN ORGANIZED HEALTH CARE EDUCATION/TRAINING PROGRAM

## 2021-08-03 RX ORDER — FAMOTIDINE 20 MG/1
20 TABLET, FILM COATED ORAL DAILY
COMMUNITY
End: 2021-08-31 | Stop reason: ALTCHOICE

## 2021-08-03 RX ORDER — PANTOPRAZOLE SODIUM 20 MG/1
20 TABLET, DELAYED RELEASE ORAL
Qty: 30 TABLET | Refills: 5 | Status: SHIPPED | OUTPATIENT
Start: 2021-08-03 | End: 2021-09-14 | Stop reason: DRUGHIGH

## 2021-08-03 NOTE — PATIENT INSTRUCTIONS
Diet for Stomach Ulcers and Gastritis   AMBULATORY CARE:   A diet for stomach ulcers and gastritis  is a meal plan that limits foods that irritate your stomach  Certain foods may worsen symptoms such as stomach pain, bloating, heartburn, or indigestion  Foods to limit or avoid:  You may need to avoid acidic, spicy, or high-fat foods  Not all foods affect everyone the same way  You will need to learn which foods worsen your symptoms and limit those foods  The following are some foods that may worsen ulcer or gastritis symptoms:  · Beverages:      ? Whole milk and chocolate milk    ? Hot cocoa and cola    ? Any beverage with caffeine    ? Regular and decaffeinated coffee    ? Peppermint and spearmint tea    ? Green and black tea, with or without caffeine    ? Orange and grapefruit juices    ? Drinks that contain alcohol    · Spices and seasonings:      ? Black and red pepper    ? Chili powder    ? Mustard seed and nutmeg    · Other foods:      ? Dairy foods made from whole milk or cream    ? Chocolate    ? Spicy or strongly flavored cheeses, such as jalapeno or black pepper    ? Highly seasoned, high-fat meats, such as sausage, salami, lee, ham, and cold cuts    ? Hot chiles and peppers    ? Tomato products, such as tomato paste, tomato sauce, or tomato juice    Foods to include:  Eat a variety of healthy foods from all the food groups  Eat fruits, vegetables, whole grains, and fat-free or low-fat dairy foods  Whole grains include whole-wheat breads, cereals, pasta, and brown rice  Choose lean meats, poultry (chicken and turkey), fish, beans, eggs, and nuts  A healthy meal plan is low in unhealthy fats, salt, and added sugar  Healthy fats include olive oil and canola oil  Ask your dietitian for more information about a healthy diet  Other helpful guidelines:   · Do not eat right before bedtime  Stop eating at least 2 hours before bedtime  · Eat small, frequent meals    Your stomach may tolerate small, frequent meals better than large meals  © Copyright [address] 2021 Information is for End User's use only and may not be sold, redistributed or otherwise used for commercial purposes  All illustrations and images included in CareNotes® are the copyrighted property of A D A M , Inc  or Tito Calderón  The above information is an  only  It is not intended as medical advice for individual conditions or treatments  Talk to your doctor, nurse or pharmacist before following any medical regimen to see if it is safe and effective for you  Pantoprazole (By mouth)   Pantoprazole (pan-TOE-pra-zole)  Treats gastroesophageal reflux disease (GERD), a damaged esophagus, and conditions that cause your stomach to make too much acid, including Zollinger-Lewis syndrome  This medicine is a proton pump inhibitor (PPI)  Brand Name(s): Protonix   There may be other brand names for this medicine  When This Medicine Should Not Be Used: This medicine is not right for everyone  Do not use it if you had an allergic reaction to pantoprazole or similar medicines  How to Use This Medicine:   Packet, Tablet, Delayed Release Tablet, Long Acting Tablet  · Your doctor will tell you how much medicine to use  Do not use more than directed  · Delayed-release tablet: Swallow the tablet whole  Do not crush, break, or chew it  · Delayed-release packet: Take the medicine mixed in apple juice or applesauce at least 30 minutes before a meal  It may also be given using a nasogastric tube when mixed in apple juice only  ? To prepare with applesauce:   § Mix the packet contents with 1 teaspoon of applesauce  Do not mix with water or other liquids or food  Do not divide the packet contents to make smaller doses  § Swallow the mixture within 10 minutes after you mix it  Do not chew or crush the granules  § Sip some water after you take the mixture to make sure you swallow all of the medicine  ?  To prepare with apple juice:   § Mix the packet contents with 1 teaspoon of apple juice in a small cup  Do not divide the packet contents to make smaller doses  § Stir for 5 seconds and drink the mixture immediately  Do not chew or crush the granules  § To make sure you get all of the medicine, add more apple juice to the cup  Drink it immediately  ? To prepare for a feeding tube:   § Pour the packet contents in a 2-ounce (60 milliliter [mL]) catheter-tip syringe  § Add 10 mL of apple juice to the syringe  Add the mixture to the tube  Gently tap or shake the barrel of the syringe to help empty it  § Add 10 mL of apple juice to the syringe and put it in the tube  Do this at least 2 times  There should be no granules left in the syringe  · This medicine should come with a Medication Guide  Ask your pharmacist for a copy if you do not have one  · Missed dose: Take a dose as soon as you remember  If it is almost time for your next dose, wait until then and take a regular dose  Do not take extra medicine to make up for a missed dose  · Store the medicine in a closed container at room temperature, away from heat, moisture, and direct light  Drugs and Foods to Avoid:   Ask your doctor or pharmacist before using any other medicine, including over-the-counter medicines, vitamins, and herbal products  · Do not use pantoprazole together with medicine that contains rilpivirine  · Some medicines can affect how pantoprazole works  Tell your doctor if you are using any of the following:   ? Ampicillin, atazanavir, dasatinib, digoxin, erlotinib, itraconazole, ketoconazole, methotrexate, mycophenolate mofetil, nelfinavir, nilotinib, saquinavir  ? Blood thinner (including warfarin)  ? Diuretic (water pill)  ? Iron supplements  Warnings While Using This Medicine:   · Tell your doctor if you are pregnant or breastfeeding, or if you have kidney disease, liver disease, lupus, or osteoporosis    · This medicine may cause the following problems:  ? Kidney problems, including acute tubulointerstitial nephritis  ? Increased risk of broken bones in the hip, wrist, or spine (more likely if used several times per day or longer than 1 year)  ? Lupus  ? Fundic gland polyps (abnormal growth in the upper part of your stomach)  · This medicine can cause diarrhea  Call your doctor if the diarrhea becomes severe, does not stop, or is bloody  Do not take any medicine to stop diarrhea until you have talked to your doctor  Diarrhea can occur 2 months or more after you stop taking this medicine  · Tell any doctor or dentist who treats you that you are using this medicine  This medicine may affect certain medical test results  · Your doctor will do lab tests at regular visits to check on the effects of this medicine  Keep all appointments  · Keep all medicine out of the reach of children  Never share your medicine with anyone  Possible Side Effects While Using This Medicine:   Call your doctor right away if you notice any of these side effects:  · Allergic reaction: Itching or hives, swelling in your face or hands, swelling or tingling in your mouth or throat, chest tightness, trouble breathing  · Blistering, peeling, red skin rash  · Fever, joint pain, swelling in your body, unusual weight gain, change in how much or how often you urinate, blood in the urine  · Joint pain, rash on your cheeks or arms that gets worse in the sun  · Seizures, dizziness, uneven heartbeat, muscle cramps or twitching  · Severe diarrhea, stomach cramp or pain, nausea, vomiting, loss of appetite, weight loss  · Unusual tiredness or weakness  If you notice these less serious side effects, talk with your doctor:   · Headache  If you notice other side effects that you think are caused by this medicine, tell your doctor  Call your doctor for medical advice about side effects   You may report side effects to FDA at 3-370-FDA-8828  © Copyright [employer] 2021 Information is for End User's use only and may not be sold, redistributed or otherwise used for commercial purposes  The above information is an  only  It is not intended as medical advice for individual conditions or treatments  Talk to your doctor, nurse or pharmacist before following any medical regimen to see if it is safe and effective for you

## 2021-08-03 NOTE — PROGRESS NOTES
[facility],     Assessment/Plan:        Diagnosis ICD-10-CM Associated Orders   1  Gastric reflux syndrome  K21 9 Ambulatory referral to Gastroenterology     pantoprazole (PROTONIX) 20 mg tablet     FL barium swallow ROUTINE   2  Hiatal hernia  K44 9 FL barium swallow ROUTINE        Concern at this time for hiatal hernia provoking GERD symptoms  Will treat with protonix and keep log of symptoms until f/u   Scheduled appt in Sept with GI  Return in about 4 weeks (around 8/31/2021) for Review hiatal hernia imaging   Patient may call or return to office with any questions or concerns  _________________________________________________________________________    Subjective:     Patient ID: Laurie Casanova is a 38 y o  female  HPI  Elke [facility] 23-year-old female with a history of allergic rhinitis, depression, and exercise-induced asthma who presents today for follow-up from the emergency department 2 days ago at [facility]  She reported at that time that she was having shortness of breath, as well as heartburn that started in April  In emergency department she received Benadryl and Pepcid, was normal   Chest x-ray did not identify any acute cardiopulmonary disease  EKG did note sinus tachycardia with a heart rate of 105  Started pepcid 3 weeks ago, with some relief, but still having episodes of breakthrough  Has one daughter 3  y/o  Remember having severe GERD in pregnancy, with pressure in throat and chest  Feels similar and provokes feelings of worry and fear of choking on it  Has tried home remedies and included wedge under pillow  The following portions of the patient's history were reviewed and updated as appropriate: allergies, current medications, past medical history and problem list     Review of Systems   Constitutional: Negative for chills and fever  HENT: Positive for trouble swallowing (Thorat feels inflamed from acid coming up)   Negative for congestion and sore throat  Eyes: Negative for pain and redness  Respiratory: Positive for cough (occasional)  Negative for shortness of breath  Cardiovascular: Negative for chest pain and palpitations  Gastrointestinal: Negative for abdominal pain, blood in stool, constipation, diarrhea, nausea and vomiting  Genitourinary: Negative for dysuria and urgency  Musculoskeletal: Negative for arthralgias, back pain and gait problem  Skin: Negative for color change and pallor  Neurological: Negative for light-headedness and headaches  Psychiatric/Behavioral: Negative for dysphoric mood  The patient is not nervous/anxious  Objective:      Vitals:    08/03/21 0830   BP: 138/82   Pulse: 88   Resp: 16   SpO2: 98%      Physical Exam  Vitals reviewed  Constitutional:       General: She is not in acute distress  Appearance: Normal appearance  She is well-developed  She is obese  She is not ill-appearing  HENT:      Head: Normocephalic and atraumatic  Eyes:      General: No scleral icterus  Right eye: No discharge  Left eye: No discharge  Neck:      Comments: No thyromegaly  Cardiovascular:      Rate and Rhythm: Normal rate and regular rhythm  Pulses: Normal pulses  Heart sounds: Normal heart sounds  No murmur heard  No friction rub  No gallop  Pulmonary:      Effort: Pulmonary effort is normal  No respiratory distress  Breath sounds: Normal breath sounds  No stridor  No wheezing or rales  Abdominal:      General: Abdomen is flat  Bowel sounds are normal  There is no distension  Palpations: Abdomen is soft  There is no mass  Tenderness: There is no abdominal tenderness  Hernia: No hernia is present  Musculoskeletal:      Cervical back: Normal range of motion  No tenderness  Lymphadenopathy:      Cervical: No cervical adenopathy  Skin:     General: Skin is warm and dry     Neurological:      Mental Status: She is alert and oriented to person, place, and time  Psychiatric:         Mood and Affect: Mood normal          Behavior: Behavior normal          Thought Content: Thought content normal          Judgment: Judgment normal            Portions of the record may have been created with voice recognition software  Occasional wrong word or "sound alike" substitutions may have occurred due to the inherent limitations of voice recognition software  Please review the chart carefully and recognize, using context, where substitutions/typographical errors may have occurred

## 2021-08-11 ENCOUNTER — HOSPITAL ENCOUNTER (OUTPATIENT)
Dept: RADIOLOGY | Facility: HOSPITAL | Age: 36
Discharge: HOME | End: 2021-08-11
Attending: STUDENT IN AN ORGANIZED HEALTH CARE EDUCATION/TRAINING PROGRAM
Payer: COMMERCIAL

## 2021-08-11 DIAGNOSIS — K21.9 GASTRIC REFLUX SYNDROME: ICD-10-CM

## 2021-08-11 DIAGNOSIS — K44.9 HIATAL HERNIA: ICD-10-CM

## 2021-08-11 PROCEDURE — 74220 X-RAY XM ESOPHAGUS 1CNTRST: CPT

## 2021-08-31 ENCOUNTER — OFFICE VISIT (OUTPATIENT)
Dept: FAMILY MEDICINE CLINIC | Facility: HOSPITAL | Age: 36
End: 2021-08-31
Payer: COMMERCIAL

## 2021-08-31 VITALS
DIASTOLIC BLOOD PRESSURE: 82 MMHG | WEIGHT: 232 LBS | BODY MASS INDEX: 37.45 KG/M2 | HEART RATE: 82 BPM | SYSTOLIC BLOOD PRESSURE: 122 MMHG

## 2021-08-31 DIAGNOSIS — R22.0 SWELLING OF LIP, TONGUE, AND THROAT: ICD-10-CM

## 2021-08-31 DIAGNOSIS — K52.29 GASTROINTESTINAL FOOD ALLERGY: Primary | ICD-10-CM

## 2021-08-31 DIAGNOSIS — R22.1 SWELLING OF LIP, TONGUE, AND THROAT: ICD-10-CM

## 2021-08-31 PROCEDURE — 99213 OFFICE O/P EST LOW 20 MIN: CPT | Performed by: STUDENT IN AN ORGANIZED HEALTH CARE EDUCATION/TRAINING PROGRAM

## 2021-08-31 PROCEDURE — 1036F TOBACCO NON-USER: CPT | Performed by: STUDENT IN AN ORGANIZED HEALTH CARE EDUCATION/TRAINING PROGRAM

## 2021-08-31 NOTE — PROGRESS NOTES
[facility],     Assessment/Plan:      Diagnosis ICD-10-CM Associated Orders   1  Gastrointestinal food allergy  K52 29 Ambulatory referral to Allergy     Food Allergy Profile   2  Swelling of lip, tongue, and throat  R22 0 Ambulatory referral to Allergy    R22 1 Food Allergy Profile      Will order food allergy profile blood test   Patient also referred to the allergist for further testing and workup   She should continue her appointment with gastroenterology and consider EGD to assess degree of gastritis, and possible hiatal hernia  Reviewed barium swallow which was normal   Continue on once daily Protonix  F/U with me after GI & Allergy appt for check up before Dec   Patient may call or return to office with any questions or concerns  ______________________________________________________________________  Subjective:     Patient ID: Kalyani Davies is a 38 y o  female  HPI  Elke PITTMAN Griffin   Is a 43-year-old pleasant female here today for follow-up of her heartburn, and throat swelling sensations  At last visit patient was changed from Pepcid to Protonix  She states that she is having significantly less heartburn than prior  She does occasionally have coughing that she thinks is likely related to episodes of heartburn  She is also trying to adhere to the brat diet and avoid heartburn inducing foods  She also states that sheLoves grapes and eats them 2-3x month  Drinks wine socially  Notices the breathing issues afterwards  Throat swelling, itchy skin, dry mouth, difficulty swallowing, upper lip goes numb, tongue feels swelling  Happened also after green tea & lettuce  Mom developed food allergies later in life  Hx of lactose intolerance with certain ages as a baby and as a teen  She has had and anuscopy done, but has never had EGD or colonoscopy performed  She does have a consult with GI coming     She is interested in seeing an allergist        The following portions of the patient's history were reviewed and updated as appropriate: allergies, current medications, past medical history and problem list     Review of Systems   Constitutional: Negative for chills and fever  HENT: Negative for congestion and rhinorrhea  See HPI   Respiratory: Positive for cough  Negative for shortness of breath  Cardiovascular: Negative for chest pain and palpitations  Gastrointestinal: Negative for diarrhea, nausea and vomiting  Skin: Negative for color change and rash  Objective:      Vitals:    08/31/21 0911   BP: 122/82   Pulse: 82      Physical Exam  Vitals reviewed  Constitutional:       General: She is not in acute distress  Appearance: Normal appearance  She is well-developed  She is not ill-appearing  HENT:      Head: Normocephalic and atraumatic  Eyes:      General: No scleral icterus  Right eye: No discharge  Left eye: No discharge  Cardiovascular:      Rate and Rhythm: Normal rate and regular rhythm  Pulses: Normal pulses  Heart sounds: Normal heart sounds  No murmur heard  No friction rub  No gallop  Pulmonary:      Effort: Pulmonary effort is normal  No respiratory distress  Breath sounds: Normal breath sounds  No stridor  No wheezing  Abdominal:      General: Abdomen is flat  Bowel sounds are normal  There is no distension  Palpations: Abdomen is soft  There is no mass  Tenderness: There is no abdominal tenderness  Hernia: No hernia is present  Musculoskeletal:      Cervical back: Normal range of motion  Skin:     General: Skin is warm and dry  Coloration: Skin is not pale  Findings: No erythema  Neurological:      Mental Status: She is alert and oriented to person, place, and time  Psychiatric:         Mood and Affect: Mood normal          Behavior: Behavior normal          Thought Content:  Thought content normal          Judgment: Judgment normal            Portions of the record may have been created with voice recognition software  Occasional wrong word or "sound alike" substitutions may have occurred due to the inherent limitations of voice recognition software  Please review the chart carefully and recognize, using context, where substitutions/typographical errors may have occurred

## 2021-09-03 ENCOUNTER — APPOINTMENT (EMERGENCY)
Dept: CT IMAGING | Facility: HOSPITAL | Age: 36
End: 2021-09-03
Payer: COMMERCIAL

## 2021-09-03 ENCOUNTER — HOSPITAL ENCOUNTER (EMERGENCY)
Facility: HOSPITAL | Age: 36
Discharge: HOME | End: 2021-09-03
Attending: EMERGENCY MEDICINE | Admitting: EMERGENCY MEDICINE
Payer: COMMERCIAL

## 2021-09-03 VITALS
WEIGHT: 232 LBS | DIASTOLIC BLOOD PRESSURE: 75 MMHG | RESPIRATION RATE: 17 BRPM | SYSTOLIC BLOOD PRESSURE: 137 MMHG | TEMPERATURE: 98.2 F | HEART RATE: 85 BPM | OXYGEN SATURATION: 99 % | BODY MASS INDEX: 37.45 KG/M2

## 2021-09-03 DIAGNOSIS — T78.40XA ACUTE ALLERGIC REACTION: Primary | ICD-10-CM

## 2021-09-03 LAB
ALBUMIN SERPL BCP-MCNC: 3.8 G/DL (ref 3.5–5)
ALP SERPL-CCNC: 97 U/L (ref 46–116)
ALT SERPL W P-5'-P-CCNC: 25 U/L (ref 12–78)
ANION GAP SERPL CALCULATED.3IONS-SCNC: 11 MMOL/L (ref 4–13)
AST SERPL W P-5'-P-CCNC: 17 U/L (ref 5–45)
ATRIAL RATE: 73 BPM
BASOPHILS # BLD AUTO: 0.02 THOUSANDS/ΜL (ref 0–0.1)
BASOPHILS NFR BLD AUTO: 0 % (ref 0–1)
BILIRUB SERPL-MCNC: 0.2 MG/DL (ref 0.2–1)
BUN SERPL-MCNC: 9 MG/DL (ref 5–25)
CALCIUM SERPL-MCNC: 9.1 MG/DL (ref 8.3–10.1)
CHLORIDE SERPL-SCNC: 106 MMOL/L (ref 100–108)
CO2 SERPL-SCNC: 25 MMOL/L (ref 21–32)
CREAT SERPL-MCNC: 0.69 MG/DL (ref 0.6–1.3)
EOSINOPHIL # BLD AUTO: 0.1 THOUSAND/ΜL (ref 0–0.61)
EOSINOPHIL NFR BLD AUTO: 2 % (ref 0–6)
ERYTHROCYTE [DISTWIDTH] IN BLOOD BY AUTOMATED COUNT: 13.2 % (ref 11.6–15.1)
GFR SERPL CREATININE-BSD FRML MDRD: 112 ML/MIN/1.73SQ M
GLUCOSE SERPL-MCNC: 88 MG/DL (ref 65–140)
HCT VFR BLD AUTO: 42.3 % (ref 34.8–46.1)
HGB BLD-MCNC: 13.3 G/DL (ref 11.5–15.4)
IMM GRANULOCYTES # BLD AUTO: 0.01 THOUSAND/UL (ref 0–0.2)
IMM GRANULOCYTES NFR BLD AUTO: 0 % (ref 0–2)
LYMPHOCYTES # BLD AUTO: 1.95 THOUSANDS/ΜL (ref 0.6–4.47)
LYMPHOCYTES NFR BLD AUTO: 29 % (ref 14–44)
MCH RBC QN AUTO: 26.1 PG (ref 26.8–34.3)
MCHC RBC AUTO-ENTMCNC: 31.4 G/DL (ref 31.4–37.4)
MCV RBC AUTO: 83 FL (ref 82–98)
MONOCYTES # BLD AUTO: 0.47 THOUSAND/ΜL (ref 0.17–1.22)
MONOCYTES NFR BLD AUTO: 7 % (ref 4–12)
NEUTROPHILS # BLD AUTO: 4.09 THOUSANDS/ΜL (ref 1.85–7.62)
NEUTS SEG NFR BLD AUTO: 62 % (ref 43–75)
NRBC BLD AUTO-RTO: 0 /100 WBCS
P AXIS: 48 DEGREES
PLATELET # BLD AUTO: 287 THOUSANDS/UL (ref 149–390)
PMV BLD AUTO: 10 FL (ref 8.9–12.7)
POTASSIUM SERPL-SCNC: 4.1 MMOL/L (ref 3.5–5.3)
PR INTERVAL: 160 MS
PROT SERPL-MCNC: 7.4 G/DL (ref 6.4–8.2)
QRS AXIS: 70 DEGREES
QRSD INTERVAL: 82 MS
QT INTERVAL: 400 MS
QTC INTERVAL: 440 MS
RBC # BLD AUTO: 5.1 MILLION/UL (ref 3.81–5.12)
S PYO DNA THROAT QL NAA+PROBE: NORMAL
SODIUM SERPL-SCNC: 142 MMOL/L (ref 136–145)
T WAVE AXIS: 57 DEGREES
TROPONIN I SERPL-MCNC: <0.02 NG/ML
VENTRICULAR RATE: 73 BPM
WBC # BLD AUTO: 6.64 THOUSAND/UL (ref 4.31–10.16)

## 2021-09-03 PROCEDURE — 93005 ELECTROCARDIOGRAM TRACING: CPT

## 2021-09-03 PROCEDURE — 93010 ELECTROCARDIOGRAM REPORT: CPT | Performed by: INTERNAL MEDICINE

## 2021-09-03 PROCEDURE — 85025 COMPLETE CBC W/AUTO DIFF WBC: CPT | Performed by: EMERGENCY MEDICINE

## 2021-09-03 PROCEDURE — 36415 COLL VENOUS BLD VENIPUNCTURE: CPT | Performed by: EMERGENCY MEDICINE

## 2021-09-03 PROCEDURE — 99284 EMERGENCY DEPT VISIT MOD MDM: CPT

## 2021-09-03 PROCEDURE — 99285 EMERGENCY DEPT VISIT HI MDM: CPT | Performed by: EMERGENCY MEDICINE

## 2021-09-03 PROCEDURE — 87651 STREP A DNA AMP PROBE: CPT | Performed by: EMERGENCY MEDICINE

## 2021-09-03 PROCEDURE — 96375 TX/PRO/DX INJ NEW DRUG ADDON: CPT

## 2021-09-03 PROCEDURE — G1004 CDSM NDSC: HCPCS

## 2021-09-03 PROCEDURE — 80053 COMPREHEN METABOLIC PANEL: CPT | Performed by: EMERGENCY MEDICINE

## 2021-09-03 PROCEDURE — 84484 ASSAY OF TROPONIN QUANT: CPT | Performed by: EMERGENCY MEDICINE

## 2021-09-03 PROCEDURE — 96374 THER/PROPH/DIAG INJ IV PUSH: CPT

## 2021-09-03 PROCEDURE — 96361 HYDRATE IV INFUSION ADD-ON: CPT

## 2021-09-03 PROCEDURE — 70491 CT SOFT TISSUE NECK W/DYE: CPT

## 2021-09-03 RX ORDER — DEXAMETHASONE SODIUM PHOSPHATE 10 MG/ML
10 INJECTION, SOLUTION INTRAMUSCULAR; INTRAVENOUS ONCE
Status: COMPLETED | OUTPATIENT
Start: 2021-09-03 | End: 2021-09-03

## 2021-09-03 RX ORDER — PREDNISONE 10 MG/1
TABLET ORAL
Qty: 30 TABLET | Refills: 0 | Status: SHIPPED | OUTPATIENT
Start: 2021-09-04 | End: 2021-09-23 | Stop reason: HOSPADM

## 2021-09-03 RX ORDER — DIPHENHYDRAMINE HYDROCHLORIDE 50 MG/ML
25 INJECTION INTRAMUSCULAR; INTRAVENOUS ONCE
Status: COMPLETED | OUTPATIENT
Start: 2021-09-03 | End: 2021-09-03

## 2021-09-03 RX ADMIN — DIPHENHYDRAMINE HYDROCHLORIDE 25 MG: 50 INJECTION INTRAMUSCULAR; INTRAVENOUS at 12:45

## 2021-09-03 RX ADMIN — DEXAMETHASONE SODIUM PHOSPHATE 10 MG: 10 INJECTION, SOLUTION INTRAMUSCULAR; INTRAVENOUS at 12:36

## 2021-09-03 RX ADMIN — SODIUM CHLORIDE 1000 ML: 0.9 INJECTION, SOLUTION INTRAVENOUS at 12:33

## 2021-09-03 RX ADMIN — FAMOTIDINE 20 MG: 10 INJECTION INTRAVENOUS at 12:44

## 2021-09-03 RX ADMIN — IOHEXOL 85 ML: 350 INJECTION, SOLUTION INTRAVENOUS at 13:24

## 2021-09-03 NOTE — ED PROVIDER NOTES
History  Chief Complaint   Patient presents with    Allergic Reaction     To ED with c/o allergic reaction started last evening after eating  States that she has had multiple epeisodes in the past several months  States that she still feels like her lips are swollen after taking benadryl  This is a 58-year-old female who presents with throat discomfort increasing since last evening after eating she has a recurrence history of allergic type symptoms with diffuse itching she has an appointment with an allergist later this month denies any nausea vomiting shortness of breath wheezing or skin rash  No current chest pain or shortness of breath      History provided by:  Patient  Medical Problem  Location:  Throat  Quality:  Tingling/ numbness discomfort and generalized itch  Severity:  Moderate  Onset quality:  Gradual  Timing:  Constant  Progression:  Waxing and waning  Chronicity:  Recurrent  Context:  Generalized itching throat discomfort on and off for the past few months  Relieved by:  Benadryl  Worsened by:  Foods unclear which type  Associated symptoms: sore throat    Associated symptoms: no rash        Prior to Admission Medications   Prescriptions Last Dose Informant Patient Reported? Taking?    Cyanocobalamin (B-12) 1000 MCG CAPS  Self Yes No   Si daily   Fluticasone Propionate (FLONASE ALLERGY RELIEF NA)  Self Yes No   Si to 2 sprays daily prn   Multiple Vitamin (MULTIVITAMIN) tablet  Self Yes No   Sig: Take 1 tablet by mouth daily   albuterol (Ventolin HFA) 90 mcg/act inhaler   No No   Sig: Inhale 1-2 puffs every 6 (six) hours as needed for wheezing   cetirizine (ZyrTEC) 10 mg tablet  Self Yes No   Sig: Take 10 mg by mouth daily   pantoprazole (PROTONIX) 20 mg tablet   No No   Sig: Take 1 tablet (20 mg total) by mouth daily before breakfast      Facility-Administered Medications: None       Past Medical History:   Diagnosis Date    Asthma     Avulsion fracture of lateral malleolus of left fibula 2020    Depression     last assessed 13    Elevated blood pressure reading     w/o diagnosis of hypertension- Last assessed 09/15/15    Generalized anxiety disorder     last assessed 14    Herniated lumbar intervertebral disc     last assessed 03/10/14    Lumbar radiculopathy     last assessed 03/10/14    Lumbosacral spondylosis without myelopathy     last assessed 03/10/14    Mild episode of recurrent major depressive disorder ([facility], [place] [address] ) 2018    Postpartum anxiety 2019    Rectal bleeding 2020       Past Surgical History:   Procedure Laterality Date    BREAST LUMPECTOMY Right     resolved      SECTION      TOOTH EXTRACTION         Family History   Problem Relation Age of Onset    Alcohol abuse Mother     Substance Abuse Mother     Mental illness Mother     Alcohol abuse Father     Substance Abuse Father     Mental illness Father     Ovarian cancer Family     Colon cancer Neg Hx      I have reviewed and agree with the history as documented  E-Cigarette/Vaping    E-Cigarette Use Never User      E-Cigarette/Vaping Substances    Nicotine No     THC No     CBD No     Flavoring No     Other No     Unknown No      Social History     Tobacco Use    Smoking status: Never Smoker    Smokeless tobacco: Never Used   Vaping Use    Vaping Use: Never used   Substance Use Topics    Alcohol use: Yes     Comment: social    Drug use: Never       Review of Systems   HENT: Positive for sore throat  Skin: Negative for rash  Diffuse pruritus   All other systems reviewed and are negative  Physical Exam  Physical Exam  Vitals and nursing note reviewed  Constitutional:       General: She is not in acute distress  Appearance: She is not ill-appearing, toxic-appearing or diaphoretic  HENT:      Head: Normocephalic and atraumatic        Right Ear: Tympanic membrane, ear canal and external ear normal       Left Ear: Tympanic membrane, ear canal and external ear normal       Nose: Nose normal       Mouth/Throat:      Mouth: Mucous membranes are moist       Pharynx: No oropharyngeal exudate or posterior oropharyngeal erythema  Eyes:      General: No scleral icterus  Right eye: No discharge  Left eye: No discharge  Extraocular Movements: Extraocular movements intact  Pupils: Pupils are equal, round, and reactive to light  Cardiovascular:      Rate and Rhythm: Normal rate and regular rhythm  Pulses: Normal pulses  Heart sounds: Murmur (Systolic ejection) heard  No friction rub  No gallop  Pulmonary:      Effort: No respiratory distress  Breath sounds: Normal breath sounds  No stridor  No wheezing, rhonchi or rales  Abdominal:      General: There is no distension  Palpations: Abdomen is soft  Tenderness: There is no abdominal tenderness  There is no guarding or rebound  Musculoskeletal:         General: No swelling, tenderness, deformity or signs of injury  Normal range of motion  Cervical back: Normal range of motion and neck supple  No rigidity or tenderness  Right lower leg: No edema  Left lower leg: No edema  Skin:     General: Skin is warm and dry  Findings: No erythema or rash  Neurological:      General: No focal deficit present  Mental Status: She is alert and oriented to person, place, and time  Cranial Nerves: No cranial nerve deficit  Sensory: No sensory deficit  Motor: No weakness  Coordination: Coordination normal       Gait: Gait normal    Psychiatric:         Mood and Affect: Mood normal          Behavior: Behavior normal          Thought Content:  Thought content normal          Vital Signs  ED Triage Vitals [09/03/21 1154]   Temperature Pulse Respirations Blood Pressure SpO2   98 2 °F (36 8 °C) 74 20 123/70 100 %      Temp Source Heart Rate Source Patient Position - Orthostatic VS BP Location FiO2 (%)   Tympanic Monitor Lying Right arm --      Pain Score       --           Vitals:    09/03/21 1400 09/03/21 1430 09/03/21 1500 09/03/21 1530   BP: 147/77 137/79 137/72 137/75   Pulse: 77 83 82 85   Patient Position - Orthostatic VS:    Lying         Visual Acuity      ED Medications  Medications   sodium chloride 0 9 % bolus 1,000 mL (1,000 mL Intravenous New Bag 9/3/21 1233)   famotidine (PEPCID) injection 20 mg (20 mg Intravenous Given 9/3/21 1244)   diphenhydrAMINE (BENADRYL) injection 25 mg (25 mg Intravenous Given 9/3/21 1245)   dexamethasone (PF) (DECADRON) injection 10 mg (10 mg Intravenous Given 9/3/21 1236)   iohexol (OMNIPAQUE) 350 MG/ML injection (SINGLE-DOSE) 100 mL (85 mL Intravenous Given 9/3/21 1324)       Diagnostic Studies  Results Reviewed     Procedure Component Value Units Date/Time    Strep A PCR [[id number]]  (Normal) Collected: 09/03/21 1459    Lab Status: Final result Specimen: Throat Updated: 09/03/21 1545     STREP A PCR None Detected    Comprehensive metabolic panel [[id number]] Collected: 09/03/21 1232    Lab Status: Final result Specimen: Blood from Arm, Left Updated: 09/03/21 1317     Sodium 142 mmol/L      Potassium 4 1 mmol/L      Chloride 106 mmol/L      CO2 25 mmol/L      ANION GAP 11 mmol/L      BUN 9 mg/dL      Creatinine 0 69 mg/dL      Glucose 88 mg/dL      Calcium 9 1 mg/dL      AST 17 U/L      ALT 25 U/L      Alkaline Phosphatase 97 U/L      Total Protein 7 4 g/dL      Albumin 3 8 g/dL      Total Bilirubin 0 20 mg/dL      eGFR 112 ml/min/1 73sq m     Narrative:      [place] guidelines for Chronic Kidney Disease (CKD):     Stage 1 with normal or high GFR (GFR > 90 mL/min/1 73 square meters)    Stage 2 Mild CKD (GFR = 60-89 mL/min/1 73 square meters)    Stage 3A Moderate CKD (GFR = 45-59 mL/min/1 73 square meters)    Stage 3B Moderate CKD (GFR = 30-44 mL/min/1 73 square meters)    Stage 4 Severe CKD (GFR = 15-29 mL/min/1 73 square meters)    Stage 5 End Stage CKD (GFR <15 mL/min/1 73 square meters)  Note: GFR calculation is accurate only with a steady state creatinine    Troponin I [[id number]]  (Normal) Collected: 09/03/21 1232    Lab Status: Final result Specimen: Blood from Arm, Left Updated: 09/03/21 1312     Troponin I <0 02 ng/mL     CBC and differential [[id number]]  (Abnormal) Collected: 09/03/21 1232    Lab Status: Final result Specimen: Blood from Arm, Left Updated: 09/03/21 1252     WBC 6 64 Thousand/uL      RBC 5 10 Million/uL      Hemoglobin 13 3 g/dL      Hematocrit 42 3 %      MCV 83 fL      MCH 26 1 pg      MCHC 31 4 g/dL      RDW 13 2 %      MPV 10 0 fL      Platelets 366 Thousands/uL      nRBC 0 /100 WBCs      Neutrophils Relative 62 %      Immat GRANS % 0 %      Lymphocytes Relative 29 %      Monocytes Relative 7 %      Eosinophils Relative 2 %      Basophils Relative 0 %      Neutrophils Absolute 4 09 Thousands/µL      Immature Grans Absolute 0 01 Thousand/uL      Lymphocytes Absolute 1 95 Thousands/µL      Monocytes Absolute 0 47 Thousand/µL      Eosinophils Absolute 0 10 Thousand/µL      Basophils Absolute 0 02 Thousands/µL                  CT soft tissue neck with contrast   Final Result by Yin Suárez MD (09/03 1354)      Possible mild tonsillitis  No peritonsillar abscess  No evidence of epiglottitis, as clinically questioned  Mild sinus disease              Workstation performed: [id number]                    Procedures  ECG 12 Lead Documentation Only    Date/Time: 9/3/2021 1:04 PM  Performed by: Michael Joshua DO  Authorized by: Michael Joshua DO     ECG reviewed by me, the ED Provider: yes    Patient location:  ED  Rate:     ECG rate:  73  Rhythm:     Rhythm: sinus rhythm    Conduction:     Conduction: normal    T waves:     T waves: normal               ED Course             HEART Risk Score      Most Recent Value   Heart Score Risk Calculator   History  0 Filed at: 09/03/2021 1351   ECG  0 Filed at: 09/03/2021 1351   Age  0 Filed at: 09/03/2021 1351   Risk Factors  0 Filed at: 09/03/2021 1351   Troponin  0 Filed at: 09/03/2021 1351   HEART Score  0 Filed at: 09/03/2021 1351                      SBIRT 20yo+      Most Recent Value   SBIRT (25 yo +)   In order to provide better care to our patients, we are screening all of our patients for alcohol and drug use  Would it be okay to ask you these screening questions? No Filed at: 09/03/2021 1157                    MDM  Number of Diagnoses or Management Options  Diagnosis management comments: Throat discomfort unclear if this is allergic in etiology versus soft tissue growth or mass versus anxiety will check labs and CT scan for further evaluation treat symptomatically       Amount and/or Complexity of Data Reviewed  Clinical lab tests: ordered  Tests in the radiology section of CPT®: ordered        Disposition  Final diagnoses:   Acute allergic reaction     Time reflects when diagnosis was documented in both MDM as applicable and the Disposition within this note     Time User Action Codes Description Comment    9/3/2021  3:48 PM Mariah Loomis Add [T78 [id number]] Acute allergic reaction       ED Disposition     ED Disposition Condition Date/Time Comment    Discharge Stable Fri Sep 3, 2021  3:48 PM Elke Moya discharge to home/self care  Follow-up Information     Follow up With Specialties Details Why [address] [place], [facility] [address]   [address] [address] [phone]            Patient's Medications   Discharge Prescriptions    PREDNISONE 10 MG TABLET    5 pills x 2 days, 4 pills x 2 days, 3 pills x 2 days, 2 pills x 2 days, 1 pill x 2 days  Start Date: 9/4/2021  End Date: --       Order Dose: --       Quantity: 30 tablet    Refills: 0     No discharge procedures on file      PDMP Review     None          ED Provider  Electronically Signed by           Yolis Segal DO  09/03/21 41 Mark Calderón DO  09/03/21 [id number]

## 2021-09-07 ENCOUNTER — OFFICE VISIT (OUTPATIENT)
Dept: FAMILY MEDICINE CLINIC | Facility: HOSPITAL | Age: 36
End: 2021-09-07
Payer: COMMERCIAL

## 2021-09-07 VITALS
BODY MASS INDEX: 37.61 KG/M2 | TEMPERATURE: 97.7 F | HEART RATE: 68 BPM | HEIGHT: 66 IN | SYSTOLIC BLOOD PRESSURE: 152 MMHG | DIASTOLIC BLOOD PRESSURE: 90 MMHG | RESPIRATION RATE: 16 BRPM | OXYGEN SATURATION: 99 % | WEIGHT: 234 LBS

## 2021-09-07 DIAGNOSIS — R22.1 SWELLING OF LIP, TONGUE, AND THROAT: ICD-10-CM

## 2021-09-07 DIAGNOSIS — J01.10 ACUTE NON-RECURRENT FRONTAL SINUSITIS: Primary | ICD-10-CM

## 2021-09-07 DIAGNOSIS — J03.91 RECURRENT TONSILLITIS: ICD-10-CM

## 2021-09-07 DIAGNOSIS — R22.0 SWELLING OF LIP, TONGUE, AND THROAT: ICD-10-CM

## 2021-09-07 PROCEDURE — 99214 OFFICE O/P EST MOD 30 MIN: CPT | Performed by: STUDENT IN AN ORGANIZED HEALTH CARE EDUCATION/TRAINING PROGRAM

## 2021-09-07 RX ORDER — EPINEPHRINE 0.3 MG/.3ML
0.3 INJECTION SUBCUTANEOUS ONCE
Qty: 0.6 ML | Refills: 0 | Status: SHIPPED | OUTPATIENT
Start: 2021-09-07 | End: 2022-07-08

## 2021-09-07 RX ORDER — AMOXICILLIN AND CLAVULANATE POTASSIUM 875; 125 MG/1; MG/1
1 TABLET, FILM COATED ORAL EVERY 12 HOURS SCHEDULED
Qty: 20 TABLET | Refills: 0 | Status: SHIPPED | OUTPATIENT
Start: 2021-09-07 | End: 2021-09-17

## 2021-09-07 NOTE — PROGRESS NOTES
[address] [facility],     Assessment/Plan:      Diagnosis ICD-10-CM Associated Orders   1  Acute non-recurrent frontal sinusitis  J01 10 amoxicillin-clavulanate (Augmentin) 875-125 mg per tablet   2  Recurrent tonsillitis  J03 91 Ambulatory Referral to Otolaryngology   3  Swelling of lip, tongue, and throat  R22 0 Ambulatory Referral to Otolaryngology    R22 1 EPINEPHrine (EPIPEN) 0 3 mg/0 3 mL SOAJ      Evidence at this time of sinusitis, will treat with Augmentin  Patient to continue wean of prednisone  She can call with any questions or concerns and can use Sudafed as needed OTC  Advised to stay well-hydrated, and continue a boring, brat diet   ENT referral placed, may need to consider possible laryngoscopy   Patient advised to follow-up with allergy specialist, and have blood work done beforehand  EpiPen provided given severity of her symptoms with prior episodes  Allergy appointment next week  Return if symptoms worsen or fail to improve   Patient may call or return to office with any questions or concerns  ______________________________________________________________________  Subjective:     Patient ID: Mirna Drake is a 38 y o  female  HPI  [facility]  ED visit Friday Thursday night pt had beans, rice, sausage, green peppers  Felt throat close up  Took benadryl 75 mg over night  Went to gym in the am, and then work  Felt itchy, throat felt swollen  Only thing that am was water & larbar  No juice or anything abnormal  No improvement in the ED except less itching  Steroids did not make difference  Strep negative, but many in the past  Could not afford tonsillectomy  COVID neg  CT soft tissue neck - Possible mild tonsillitis  No peritonsillar abscess    No evidence of epiglottitis, as clinically questioned  Mild sinus disease  [place] appt allergy 9/13/21, still doing BW beforehand  Now feeling sore throat, congestion  Sudafed no improvement   No steady cough or chest pressure  No sick contacts  No covid contacts, fully vaccinated  Always masked  The following portions of the patient's history were reviewed and updated as appropriate: allergies, current medications, past medical history and problem list     Review of Systems   Constitutional: Negative for chills, diaphoresis, fatigue and fever  HENT: Positive for congestion, postnasal drip, rhinorrhea, sinus pressure, sinus pain, sore throat and voice change (hoarse)  Negative for ear discharge and ear pain  Eyes: Negative for pain and redness  Respiratory: Negative for cough and shortness of breath  Cardiovascular: Negative for chest pain and palpitations  Gastrointestinal: Negative for diarrhea, nausea and vomiting  Genitourinary: Negative for frequency and urgency  Skin: Negative for pallor and rash  Neurological: Negative for dizziness and light-headedness  Objective:      Vitals:    09/07/21 0953   BP: 152/90   Pulse: 68   Resp: 16   Temp: 97 7 °F (36 5 °C)   SpO2: 99%      Physical Exam  Vitals reviewed  Constitutional:       General: She is not in acute distress  Appearance: Normal appearance  She is well-developed  She is obese  Comments: Appears tired,  Has hoarse voice   HENT:      Head: Normocephalic and atraumatic  Right Ear: Tympanic membrane, ear canal and external ear normal  There is no impacted cerumen  Left Ear: Tympanic membrane, ear canal and external ear normal  There is no impacted cerumen  Nose: Congestion present  Mouth/Throat:      Mouth: Mucous membranes are moist       Pharynx: Oropharynx is clear  No oropharyngeal exudate  Comments: Tonsils mildly enlarged bilaterally, but non erythematous  Eyes:      General: No scleral icterus  Right eye: No discharge  Left eye: No discharge  Cardiovascular:      Rate and Rhythm: Normal rate and regular rhythm  Pulses: Normal pulses        Heart sounds: Normal heart sounds  No murmur heard  No friction rub  No gallop  Pulmonary:      Effort: Pulmonary effort is normal  No respiratory distress  Breath sounds: No stridor  Musculoskeletal:      Cervical back: Normal range of motion and neck supple  Tenderness (Bilateral submandibular) present  No rigidity  Lymphadenopathy:      Cervical: No cervical adenopathy  Skin:     General: Skin is warm  Neurological:      Mental Status: She is alert and oriented to person, place, and time  Psychiatric:         Mood and Affect: Mood normal          Behavior: Behavior normal          Thought Content: Thought content normal          Judgment: Judgment normal            Portions of the record may have been created with voice recognition software  Occasional wrong word or "sound alike" substitutions may have occurred due to the inherent limitations of voice recognition software  Please review the chart carefully and recognize, using context, where substitutions/typographical errors may have occurred

## 2021-09-14 ENCOUNTER — OFFICE VISIT (OUTPATIENT)
Dept: GASTROENTEROLOGY | Facility: CLINIC | Age: 36
End: 2021-09-14
Payer: COMMERCIAL

## 2021-09-14 ENCOUNTER — TELEPHONE (OUTPATIENT)
Dept: GASTROENTEROLOGY | Facility: CLINIC | Age: 36
End: 2021-09-14

## 2021-09-14 VITALS
WEIGHT: 232 LBS | BODY MASS INDEX: 37.28 KG/M2 | HEIGHT: 66 IN | SYSTOLIC BLOOD PRESSURE: 152 MMHG | DIASTOLIC BLOOD PRESSURE: 84 MMHG

## 2021-09-14 DIAGNOSIS — K21.9 GASTRIC REFLUX SYNDROME: Primary | ICD-10-CM

## 2021-09-14 PROBLEM — R22.0 SWELLING OF LIP, TONGUE, AND THROAT: Status: RESOLVED | Noted: 2021-09-14 | Resolved: 2021-09-14

## 2021-09-14 PROBLEM — R22.1 SWELLING OF LIP, TONGUE, AND THROAT: Status: RESOLVED | Noted: 2021-09-14 | Resolved: 2021-09-14

## 2021-09-14 PROBLEM — R22.1 SWELLING OF LIP, TONGUE, AND THROAT: Status: ACTIVE | Noted: 2021-09-14

## 2021-09-14 PROBLEM — R22.0 SWELLING OF LIP, TONGUE, AND THROAT: Status: ACTIVE | Noted: 2021-09-14

## 2021-09-14 PROCEDURE — 1036F TOBACCO NON-USER: CPT | Performed by: NURSE PRACTITIONER

## 2021-09-14 PROCEDURE — 3008F BODY MASS INDEX DOCD: CPT | Performed by: NURSE PRACTITIONER

## 2021-09-14 PROCEDURE — 99203 OFFICE O/P NEW LOW 30 MIN: CPT | Performed by: NURSE PRACTITIONER

## 2021-09-14 RX ORDER — PANTOPRAZOLE SODIUM 20 MG/1
20 TABLET, DELAYED RELEASE ORAL 2 TIMES DAILY
Qty: 60 TABLET | Refills: 5 | Status: SHIPPED | OUTPATIENT
Start: 2021-09-14 | End: 2021-11-10 | Stop reason: ALTCHOICE

## 2021-09-14 NOTE — LETTER
September 14, 2021     Katya De Leon, [address] [facility] [address]   [address] [address]    Patient: Laurie Casanova   YOB: 1985   Date of Visit: 9/14/2021       Dear Dr Yandel Copeland: Thank you for referring Deangelo Conklinrajinder to me for evaluation  Below are my notes for this consultation  If you have questions, please do not hesitate to call me  I look forward to following your patient along with you  Sincerely,        JEY Botello        CC: No Recipients  Vidhi Calro  9/14/2021  4:32 PM  Sign when Signing Visit    [address] [facility] - Outpatient Consultation  Elke Moya 38 y o  female MRN: [id number]  Encounter: [id number]    ASSESSMENT AND PLAN:    1  Dysphagia/throat swelling  2-3 month history throat swelling, difficulty swallowing and sensation of throat closing  Intermittent lip numbness and swelling  No response to antihistamines  Normal recent barium swallow, CT scan of neck revealed mild tonsillitis but no clear etiology for symptoms  Recent increase in GERD symptoms which have worsened sesnsation of throat swelling  Her symptoms may be related to proximal esophageal stricture, or possibly esophagitis, or gastritis   Would recommend EGD to evaluate for GI etiology of her symptoms    -scheduled for EGD at [facility] ([facility])  -follow-up with allergist for testing - Zyrtec on hold    2  Gastric reflux syndrome  2-3 month history daily heartburn/indigestion and intermittent reflux with lying down  Minimal improvement with addition of pantoprazole 20 mg daily    -increase PPI to pantoprazole 20 mg b i d   -Gaviscon antacid prn  -plan for EGD as above  -reviewed GERD diet and lifestyle modifications, written instructions provided    - Ambulatory referral to Gastroenterology  - pantoprazole (PROTONIX) 20 mg tablet; Take 1 tablet (20 mg total) by mouth 2 (two) times a day  Dispense: 60 tablet;  Refill: 5      Followup Appointment: 3 months  ______________________________________________________________________    Chief Complaint   Patient presents with    GERD     new onset, seen in ER       HPI:   Mirna Drake is a 38y o  year old female who presents with 2-3 month history of sensation of throat swelling, difficulty swallowing and feeling like her throat is closing  Sensation always occurs after eating-unable to identify specific food that precipitates this feeling  She has also been experiencing intermittent lip numbness and swelling, also associated with food ingestion  She denies dysphagia or sensation of food getting stuck in chest  Denies nausea or vomiting  No recent fever chills  She is also experiencing 2-3 month history of daily heartburn/indigestion and occasional liquid reflux, usually at night with lying down  She was started on pantoprazole and taking Gaviscon daily with no improvement in her symptom  Heartburn does worsen sensation of throat swelling and difficulty swallowing    Barium esophagram was negative with normal esophageal motility, CT scan of the neck showed normal appearing thyroid, mild tonsillitis, no evidence of epiglottitis     She has been evaluated by an allergist and is scheduled to undergo allergy testing  No improvement in her symptoms with Benadryl or  daily antihistamine    She also underwent ENT evaluation which did not reveal any clear etiology for her symptoms            Historical Information   Past Medical History:   Diagnosis Date    Asthma     Avulsion fracture of lateral malleolus of left fibula 8/4/2020    Depression     last assessed 11/25/13    Elevated blood pressure reading     w/o diagnosis of hypertension- Last assessed 09/15/15    Generalized anxiety disorder     last assessed 12/04/14    Herniated lumbar intervertebral disc     last assessed 03/10/14    Lumbar radiculopathy     last assessed 03/10/14    Lumbosacral spondylosis without myelopathy     last assessed 03/10/14    Mild episode of recurrent major depressive disorder ([facility] [address] ) 4/11/2018    Postpartum anxiety 1/23/2019    Rectal bleeding 7/20/2020     Past Surgical History:   Procedure Laterality Date    BREAST LUMPECTOMY Right     resolved [place] EXTRACTION       Social History     Substance and Sexual Activity   Alcohol Use Yes    Comment: social     Social History     Substance and Sexual Activity   Drug Use Never     Social History     Tobacco Use   Smoking Status Never Smoker   Smokeless Tobacco Never Used     Family History   Problem Relation Age of Onset    Alcohol abuse Mother     Substance Abuse Mother     Mental illness Mother     Alcohol abuse Father     Substance Abuse Father     Mental illness Father     Ovarian cancer Family     Irritable bowel syndrome Brother     Colon cancer Neg Hx     Colon polyps Neg Hx        Meds/Allergies     Current Outpatient Medications:     albuterol (Ventolin HFA) 90 mcg/act inhaler    Alum Hydroxide-Mag Carbonate (GAVISCON PO)    amoxicillin-clavulanate (Augmentin) 875-125 mg per tablet    cetirizine (ZyrTEC) 10 mg tablet    Cyanocobalamin (B-12) 1000 MCG CAPS    EPINEPHrine (EPIPEN) 0 3 mg/0 3 mL SOAJ    Fluticasone Propionate (FLONASE ALLERGY RELIEF NA)    Multiple Vitamin (MULTIVITAMIN) tablet    pseudoephedrine (SUDAFED) 30 mg tablet    pantoprazole (PROTONIX) 20 mg tablet    predniSONE 10 mg tablet    Allergies   Allergen Reactions    Grapeseed Extract [Fish Oil - Food Allergy] Throat Swelling    Morphine And Related     Oxycodone-Acetaminophen     Raspberry - Food Allergy Hives, Itching and Throat Swelling    Molds & Smuts Hives, Itching and Rash       PHYSICAL EXAM:    Blood pressure 152/84, height 5' 6" (1 676 m), weight 105 kg (232 lb)  Body mass index is 37 45 kg/m²  General Appearance: NAD, cooperative, alert  Eyes: Anicteric, PERRLA, EOMI  ENT:  Normocephalic, atraumatic, normal mucosa      Neck:  Supple, symmetrical, trachea midline, mild enlargement of thyroid noted on palpation  Resp:  Clear to auscultation bilaterally; no rales, rhonchi or wheezing; respirations unlabored   CV:  S1 S2, Regular rate and rhythm; no murmur, rub, or gallop  GI:  Soft, non-tender, non-distended; normal bowel sounds; no masses, no organomegaly   Rectal: Deferred  Musculoskeletal: No cyanosis, clubbing or edema  Normal ROM  Skin:  No jaundice, rashes, or lesions   Psych: Normal affect, good eye contact  Neuro: No gross deficits, AAOx3    Lab Results:   Lab Results   Component Value Date    WBC 6 64 09/03/2021    HGB 13 3 09/03/2021    HCT 42 3 09/03/2021    MCV 83 09/03/2021     09/03/2021     Lab Results   Component Value Date     (L) 11/29/2017    K 4 1 09/03/2021     09/03/2021    CO2 25 09/03/2021    BUN 9 09/03/2021    CREATININE 0 69 09/03/2021    CALCIUM 9 1 09/03/2021    AST 17 09/03/2021    ALT 25 09/03/2021    ALKPHOS 97 09/03/2021    PROT 6 9 11/29/2017    BILITOT 0 4 11/29/2017    EGFR 112 09/03/2021     Radiology Results:   CT soft tissue neck with contrast    Result Date: 9/3/2021  Narrative: CT NECK WITH CONTRAST INDICATION:   Epiglottitis or tonsillitis suspected Throat discomfort  COMPARISON:  None  TECHNIQUE:  Axial, sagittal, and coronal 2D reformatted images were created from the axial source data and submitted for interpretation  Radiation dose length product (DLP) for this visit:  498 mGy-cm   This examination, like all CT scans performed in the [facility], was performed utilizing techniques to minimize radiation dose exposure, including the use of iterative reconstruction and automated exposure control  IV Contrast:  85 mL of iohexol (OMNIPAQUE) IMAGE QUALITY:  Diagnostic  FINDINGS: VISUALIZED BRAIN PARENCHYMA:  No acute intracranial pathology of the visualized brain parenchyma   VISUALIZED ORBITS AND PARANASAL SINUSES:  Orbits are normal   Mild scattered mucosal thickening in left frontal and left ethmoid sinuses  NASAL CAVITY AND NASOPHARYNX:  Normal  SUPRAHYOID NECK:  Mild enlargement of bilateral palatine tonsils  No rim-enhancing fluid collection to suggest abscess  Normal oral cavity, tongue base, and epiglottis  INFRAHYOID NECK:  Aryepiglottic folds and piriform sinuses are normal   Normal glottis and subglottic airway  No rim-enhancing fluid collection to suggest abscess  THYROID GLAND:  Unremarkable  PAROTID AND SUBMANDIBULAR GLANDS:  Normal  LYMPH NODES:  No pathologic or enlarged adenopathy  VASCULAR STRUCTURES:  Normal enhancement of the cervical vasculature  THORACIC INLET:  Lung apices and upper mediastinum are unremarkable  THYROID: 0 6 cm hypodense nodule in right thyroid gland (2:77)  Incidental discovery of one or more thyroid nodule(s) measuring less than 1 5 cm and without suspicious features is noted in this patient who is above 28years old; according to guidelines published in the February 2015 white paper on incidental thyroid nodules in the Journal of the [employer] of Radiology [facility]), no further evaluation is recommended  BONY STRUCTURES: No acute fracture or destructive osseous lesion  Impression: Possible mild tonsillitis  No peritonsillar abscess  No evidence of epiglottitis, as clinically questioned  Mild sinus disease  Workstation performed: [id number]         REVIEW OF SYSTEMS:    CONSTITUTIONAL: Denies any fever, chills, rigors, and weight loss  HEENT: No earache or tinnitus  Denies hearing loss or visual disturbances  CARDIOVASCULAR: No chest pain or palpitations  RESPIRATORY: Denies any cough, hemoptysis, shortness of breath or dyspnea on exertion  GASTROINTESTINAL: As noted in the History of Present Illness  GENITOURINARY: No problems with urination  Denies any hematuria or dysuria  NEUROLOGIC: No dizziness or vertigo, denies headaches  MUSCULOSKELETAL: Denies any muscle or joint pain     SKIN: Denies skin rashes , occassional diffuse itching    ENDOCRINE: Denies excessive thirst  Denies intolerance to heat or cold  PSYCHOSOCIAL: Denies depression or anxiety  Denies any recent memory loss

## 2021-09-14 NOTE — PATIENT INSTRUCTIONS
GERD (Gastroesophageal Reflux Disease)   WHAT YOU NEED TO KNOW:   Gastroesophageal reflux disease (GERD) is reflux that occurs more than twice a week for a few weeks  Reflux means acid and food in the stomach back up into the esophagus  It usually causes heartburn and other symptoms  GERD can cause other health problems over time if it is not treated  DISCHARGE INSTRUCTIONS:   Call your local emergency number (911 in the [address] [facility]) if:   · You have severe chest pain and sudden trouble breathing  Return to the emergency department if:   · You have trouble breathing after you vomit  · You have trouble swallowing, or pain with swallowing  · Your bowel movements are black, bloody, or tarry-looking  · Your vomit looks like coffee grounds or has blood in it  Call your doctor or gastroenterologist if:   · You feel full and cannot burp or vomit  · You vomit large amounts, or you vomit often  · You are losing weight without trying  · Your symptoms get worse or do not improve with treatment  · You have questions or concerns about your condition or care  Medicines:   · Medicines  are used to decrease stomach acid  Medicine may also be used to help your lower esophageal sphincter and stomach contract (tighten) more  · Take your medicine as directed  Contact your healthcare provider if you think your medicine is not helping or if you have side effects  Tell him of her if you are allergic to any medicine  Keep a list of the medicines, vitamins, and herbs you take  Include the amounts, and when and why you take them  Bring the list or the pill bottles to follow-up visits  Carry your medicine list with you in case of an emergency  Manage GERD:       · Do not have foods or drinks that may increase heartburn  These include chocolate, peppermint, fried or fatty foods, drinks that contain caffeine, or carbonated drinks (soda)   Other foods include spicy foods, onions, tomatoes, and tomato-based foods  Do not have foods or drinks that can irritate your esophagus, such as citrus fruits, juices, and alcohol  · Do not eat large meals  When you eat a lot of food at one time, your stomach needs more acid to digest it  Eat 6 small meals each day instead of 3 large ones, and eat slowly  Do not eat meals 2 to 3 hours before bedtime  · Elevate the head of your bed  Place 6-inch blocks under the head of your bed frame  You may also use more than one pillow under your head and shoulders while you sleep  · Maintain a healthy weight  If you are overweight, weight loss may help relieve symptoms of GERD  · Do not smoke  Smoking weakens the lower esophageal sphincter and increases the risk of GERD  Ask your healthcare provider for information if you currently smoke and need help to quit  E-cigarettes or smokeless tobacco still contain nicotine  Talk to your healthcare provider before you use these products  · Do not wear clothing that is tight around your waist   Tight clothing can put pressure on your stomach and cause or worsen GERD symptoms  Follow up with your doctor or gastroenterologist as directed:  Write down your questions so you remember to ask them during your visits  © Copyright [employer] 2021 Information is for End User's use only and may not be sold, redistributed or otherwise used for commercial purposes  All illustrations and images included in CareNotes® are the copyrighted property of [employer]  or Tito Calderón  The above information is an  only  It is not intended as medical advice for individual conditions or treatments  Talk to your doctor, nurse or pharmacist before following any medical regimen to see if it is safe and effective for you

## 2021-09-14 NOTE — TELEPHONE ENCOUNTER
Jacqueline Serra from [facility] states they rec'd a script for Pantoprazole 20, one twice daily  Insurance has rejected/will only pay for one-either need new Rx for 40 mg or PA for 20 twice daily; req CB to advise [phone]

## 2021-09-14 NOTE — TELEPHONE ENCOUNTER
Spoke to the pharmacist  Pt's insurance will only cover 1 tablet daily  Rx changed to pantoprazole 40 mg daily   Left message for pt on her voicemail

## 2021-09-14 NOTE — PROGRESS NOTES
[facility] - Outpatient Consultation  Elke Moya 38 y o  female MRN: [id number]  Encounter: [id number]    ASSESSMENT AND PLAN:    1  Dysphagia/throat swelling  2-3 month history throat swelling, difficulty swallowing and sensation of throat closing  Intermittent lip numbness and swelling  No response to antihistamines  Normal recent barium swallow, CT scan of neck revealed mild tonsillitis but no clear etiology for symptoms  Recent increase in GERD symptoms which have worsened sesnsation of throat swelling  Her symptoms may be related to proximal esophageal stricture, or possibly esophagitis, or gastritis   Would recommend EGD to evaluate for GI etiology of her symptoms    -scheduled for EGD at [facility])  -follow-up with allergist for testing - Zyrtec on hold    2  Gastric reflux syndrome  2-3 month history daily heartburn/indigestion and intermittent reflux with lying down  Minimal improvement with addition of pantoprazole 20 mg daily    -increase PPI to pantoprazole 20 mg b i d   -Gaviscon antacid prn  -plan for EGD as above  -reviewed GERD diet and lifestyle modifications, written instructions provided    - Ambulatory referral to Gastroenterology  - pantoprazole (PROTONIX) 20 mg tablet; Take 1 tablet (20 mg total) by mouth 2 (two) times a day  Dispense: 60 tablet; Refill: 5      Followup Appointment: 3 months  ______________________________________________________________________    Chief Complaint   Patient presents with    GERD     new onset, seen in ER       HPI:   Olivia Green is a 38y o  year old female who presents with 2-3 month history of sensation of throat swelling, difficulty swallowing and feeling like her throat is closing  Sensation always occurs after eating-unable to identify specific food that precipitates this feeling  She has also been experiencing intermittent lip numbness and swelling, also associated with food ingestion    She denies dysphagia or sensation of food getting stuck in chest  Denies nausea or vomiting  No recent fever chills  She is also experiencing 2-3 month history of daily heartburn/indigestion and occasional liquid reflux, usually at night with lying down  She was started on pantoprazole and taking Gaviscon daily with no improvement in her symptom  Heartburn does worsen sensation of throat swelling and difficulty swallowing    Barium esophagram was negative with normal esophageal motility, CT scan of the neck showed normal appearing thyroid, mild tonsillitis, no evidence of epiglottitis     She has been evaluated by an allergist and is scheduled to undergo allergy testing  No improvement in her symptoms with Benadryl or  daily antihistamine    She also underwent ENT evaluation which did not reveal any clear etiology for her symptoms            Historical Information   Past Medical History:   Diagnosis Date    Asthma     Avulsion fracture of lateral malleolus of left fibula 8/4/2020    Depression     last assessed 11/25/13    Elevated blood pressure reading     w/o diagnosis of hypertension- Last assessed 09/15/15    Generalized anxiety disorder     last assessed 12/04/14    Herniated lumbar intervertebral disc     last assessed 03/10/14    Lumbar radiculopathy     last assessed 03/10/14    Lumbosacral spondylosis without myelopathy     last assessed 03/10/14    Mild episode of recurrent major depressive disorder ([facility] [address] ) 4/11/2018    Postpartum anxiety 1/23/2019    Rectal bleeding 7/20/2020     Past Surgical History:   Procedure Laterality Date    BREAST LUMPECTOMY Right     resolved [address]      TOOTH EXTRACTION       Social History     Substance and Sexual Activity   Alcohol Use Yes    Comment: social     Social History     Substance and Sexual Activity   Drug Use Never     Social History     Tobacco Use   Smoking Status Never Smoker   Smokeless Tobacco Never Used     Family History   Problem Relation Age of Onset    Alcohol abuse Mother     Substance Abuse Mother     Mental illness Mother     Alcohol abuse Father     Substance Abuse Father     Mental illness Father     Ovarian cancer Family     Irritable bowel syndrome Brother     Colon cancer Neg Hx     Colon polyps Neg Hx        Meds/Allergies     Current Outpatient Medications:     albuterol (Ventolin HFA) 90 mcg/act inhaler    Alum Hydroxide-Mag Carbonate (GAVISCON PO)    amoxicillin-clavulanate (Augmentin) 875-125 mg per tablet    cetirizine (ZyrTEC) 10 mg tablet    Cyanocobalamin (B-12) 1000 MCG CAPS    EPINEPHrine (EPIPEN) 0 3 mg/0 3 mL SOAJ    Fluticasone Propionate (FLONASE ALLERGY RELIEF NA)    Multiple Vitamin (MULTIVITAMIN) tablet    pseudoephedrine (SUDAFED) 30 mg tablet    pantoprazole (PROTONIX) 20 mg tablet    predniSONE 10 mg tablet    Allergies   Allergen Reactions    Grapeseed Extract [Fish Oil - Food Allergy] Throat Swelling    Morphine And Related     Oxycodone-Acetaminophen     Raspberry - Food Allergy Hives, Itching and Throat Swelling    Molds & Smuts Hives, Itching and Rash       PHYSICAL EXAM:    Blood pressure 152/84, height 5' 6" (1 676 m), weight 105 kg (232 lb)  Body mass index is 37 45 kg/m²  General Appearance: NAD, cooperative, alert  Eyes: Anicteric, PERRLA, EOMI  ENT:  Normocephalic, atraumatic, normal mucosa  Neck:  Supple, symmetrical, trachea midline, mild enlargement of thyroid noted on palpation  Resp:  Clear to auscultation bilaterally; no rales, rhonchi or wheezing; respirations unlabored   CV:  S1 S2, Regular rate and rhythm; no murmur, rub, or gallop  GI:  Soft, non-tender, non-distended; normal bowel sounds; no masses, no organomegaly   Rectal: Deferred  Musculoskeletal: No cyanosis, clubbing or edema  Normal ROM    Skin:  No jaundice, rashes, or lesions   Psych: Normal affect, good eye contact  Neuro: No gross deficits, AAOx3    Lab Results:   Lab Results   Component Value Date WBC 6 64 09/03/2021    HGB 13 3 09/03/2021    HCT 42 3 09/03/2021    MCV 83 09/03/2021     09/03/2021     Lab Results   Component Value Date     (L) 11/29/2017    K 4 1 09/03/2021     09/03/2021    CO2 25 09/03/2021    BUN 9 09/03/2021    CREATININE 0 69 09/03/2021    CALCIUM 9 1 09/03/2021    AST 17 09/03/2021    ALT 25 09/03/2021    ALKPHOS 97 09/03/2021    PROT 6 9 11/29/2017    BILITOT 0 4 11/29/2017    EGFR 112 09/03/2021     Radiology Results:   CT soft tissue neck with contrast    Result Date: 9/3/2021  Narrative: CT NECK WITH CONTRAST INDICATION:   Epiglottitis or tonsillitis suspected Throat discomfort  COMPARISON:  None  TECHNIQUE:  Axial, sagittal, and coronal 2D reformatted images were created from the axial source data and submitted for interpretation  Radiation dose length product (DLP) for this visit:  498 mGy-cm   This examination, like all CT scans performed in the [facility], was performed utilizing techniques to minimize radiation dose exposure, including the use of iterative reconstruction and automated exposure control  IV Contrast:  85 mL of iohexol (OMNIPAQUE) IMAGE QUALITY:  Diagnostic  FINDINGS: VISUALIZED BRAIN PARENCHYMA:  No acute intracranial pathology of the visualized brain parenchyma  VISUALIZED ORBITS AND PARANASAL SINUSES:  Orbits are normal   Mild scattered mucosal thickening in left frontal and left ethmoid sinuses  NASAL CAVITY AND NASOPHARYNX:  Normal  SUPRAHYOID NECK:  Mild enlargement of bilateral palatine tonsils  No rim-enhancing fluid collection to suggest abscess  Normal oral cavity, tongue base, and epiglottis  INFRAHYOID NECK:  Aryepiglottic folds and piriform sinuses are normal   Normal glottis and subglottic airway  No rim-enhancing fluid collection to suggest abscess  THYROID GLAND:  Unremarkable  PAROTID AND SUBMANDIBULAR GLANDS:  Normal  LYMPH NODES:  No pathologic or enlarged adenopathy   VASCULAR STRUCTURES:  Normal enhancement of the cervical vasculature  THORACIC INLET:  Lung apices and upper mediastinum are unremarkable  THYROID: 0 6 cm hypodense nodule in right thyroid gland (2:77)  Incidental discovery of one or more thyroid nodule(s) measuring less than 1 5 cm and without suspicious features is noted in this patient who is above 28years old; according to guidelines published in the February 2015 white paper on incidental thyroid nodules in the Journal of the [employer] of Radiology [facility]), no further evaluation is recommended  BONY STRUCTURES: No acute fracture or destructive osseous lesion  Impression: Possible mild tonsillitis  No peritonsillar abscess  No evidence of epiglottitis, as clinically questioned  Mild sinus disease  Workstation performed: [id number]         REVIEW OF SYSTEMS:    CONSTITUTIONAL: Denies any fever, chills, rigors, and weight loss  HEENT: No earache or tinnitus  Denies hearing loss or visual disturbances  CARDIOVASCULAR: No chest pain or palpitations  RESPIRATORY: Denies any cough, hemoptysis, shortness of breath or dyspnea on exertion  GASTROINTESTINAL: As noted in the History of Present Illness  GENITOURINARY: No problems with urination  Denies any hematuria or dysuria  NEUROLOGIC: No dizziness or vertigo, denies headaches  MUSCULOSKELETAL: Denies any muscle or joint pain  SKIN: Denies skin rashes , occassional diffuse itching    ENDOCRINE: Denies excessive thirst  Denies intolerance to heat or cold  PSYCHOSOCIAL: Denies depression or anxiety  Denies any recent memory loss

## 2021-09-15 LAB
ALMOND IGE QN: <0.1 KU/L
CASHEW NUT IGE QN: <0.1 KU/L
CODFISH IGE QN: <0.1 KU/L
DEPRECATED ALMOND IGE RAST QL: 0
DEPRECATED CASHEW NUT IGE RAST QL: 0
DEPRECATED CODFISH IGE RAST QL: 0
DEPRECATED EGG WHITE IGE RAST QL: 0
DEPRECATED HAZELNUT IGE RAST QL: 0
DEPRECATED MILK IGE RAST QL: 0
DEPRECATED PEANUT IGE RAST QL: 0
DEPRECATED SALMON IGE RAST QL: 0
DEPRECATED SCALLOP IGE RAST QL: 0
DEPRECATED SESAME SEED IGE RAST QL: 0
DEPRECATED SHRIMP IGE RAST QL: 0
DEPRECATED SOYBEAN IGE RAST QL: 0
DEPRECATED TUNA IGE RAST QL: 0
DEPRECATED WALNUT IGE RAST QL: 0
DEPRECATED WHEAT IGE RAST QL: 0
EGG WHITE IGE QN: <0.1 KU/L
HAZELNUT IGE QN: <0.1 KU/L
MILK IGE QN: <0.1 KU/L
PEANUT IGE QN: <0.1 KU/L
SALMON IGE QN: <0.1 KU/L
SCALLOP IGE QN: <0.1 KU/L
SESAME SEED IGE QN: <0.1 KU/L
SHRIMP IGE QN: <0.1 KU/L
SOYBEAN IGE QN: <0.1 KU/L
TUNA IGE QN: <0.1 KU/L
WALNUT IGE QN: <0.1 KU/L
WHEAT IGE QN: <0.1 KU/L

## 2021-09-23 ENCOUNTER — ANESTHESIA EVENT (OUTPATIENT)
Dept: GASTROENTEROLOGY | Facility: AMBULATORY SURGERY CENTER | Age: 36
End: 2021-09-23

## 2021-09-23 ENCOUNTER — ANESTHESIA (OUTPATIENT)
Dept: GASTROENTEROLOGY | Facility: AMBULATORY SURGERY CENTER | Age: 36
End: 2021-09-23

## 2021-09-23 ENCOUNTER — HOSPITAL ENCOUNTER (OUTPATIENT)
Dept: GASTROENTEROLOGY | Facility: AMBULATORY SURGERY CENTER | Age: 36
Discharge: HOME | End: 2021-09-23
Payer: COMMERCIAL

## 2021-09-23 VITALS
OXYGEN SATURATION: 100 % | RESPIRATION RATE: 16 BRPM | HEART RATE: 65 BPM | DIASTOLIC BLOOD PRESSURE: 79 MMHG | SYSTOLIC BLOOD PRESSURE: 139 MMHG | TEMPERATURE: 97.7 F

## 2021-09-23 DIAGNOSIS — K21.9 GASTRIC REFLUX SYNDROME: ICD-10-CM

## 2021-09-23 DIAGNOSIS — R13.10 DYSPHAGIA, UNSPECIFIED TYPE: ICD-10-CM

## 2021-09-23 LAB
EXT PREGNANCY TEST URINE: NEGATIVE
EXT. CONTROL: NORMAL

## 2021-09-23 PROCEDURE — 43239 EGD BIOPSY SINGLE/MULTIPLE: CPT | Performed by: INTERNAL MEDICINE

## 2021-09-23 PROCEDURE — 88313 SPECIAL STAINS GROUP 2: CPT | Performed by: PATHOLOGY

## 2021-09-23 PROCEDURE — 43450 DILATE ESOPHAGUS 1/MULT PASS: CPT | Performed by: INTERNAL MEDICINE

## 2021-09-23 PROCEDURE — 88305 TISSUE EXAM BY PATHOLOGIST: CPT | Performed by: PATHOLOGY

## 2021-09-23 RX ORDER — SODIUM CHLORIDE, SODIUM LACTATE, POTASSIUM CHLORIDE, CALCIUM CHLORIDE 600; 310; 30; 20 MG/100ML; MG/100ML; MG/100ML; MG/100ML
INJECTION, SOLUTION INTRAVENOUS CONTINUOUS PRN
Status: DISCONTINUED | OUTPATIENT
Start: 2021-09-23 | End: 2021-09-23

## 2021-09-23 RX ORDER — LIDOCAINE HYDROCHLORIDE 10 MG/ML
INJECTION, SOLUTION EPIDURAL; INFILTRATION; INTRACAUDAL; PERINEURAL AS NEEDED
Status: DISCONTINUED | OUTPATIENT
Start: 2021-09-23 | End: 2021-09-23

## 2021-09-23 RX ORDER — SODIUM CHLORIDE, SODIUM LACTATE, POTASSIUM CHLORIDE, CALCIUM CHLORIDE 600; 310; 30; 20 MG/100ML; MG/100ML; MG/100ML; MG/100ML
50 INJECTION, SOLUTION INTRAVENOUS CONTINUOUS
Status: DISCONTINUED | OUTPATIENT
Start: 2021-09-23 | End: 2021-09-27 | Stop reason: HOSPADM

## 2021-09-23 RX ORDER — PROPOFOL 10 MG/ML
INJECTION, EMULSION INTRAVENOUS AS NEEDED
Status: DISCONTINUED | OUTPATIENT
Start: 2021-09-23 | End: 2021-09-23

## 2021-09-23 RX ADMIN — LIDOCAINE HYDROCHLORIDE 50 MG: 10 INJECTION, SOLUTION EPIDURAL; INFILTRATION; INTRACAUDAL; PERINEURAL at 15:12

## 2021-09-23 RX ADMIN — PROPOFOL 50 MG: 10 INJECTION, EMULSION INTRAVENOUS at 15:15

## 2021-09-23 RX ADMIN — SODIUM CHLORIDE, SODIUM LACTATE, POTASSIUM CHLORIDE, CALCIUM CHLORIDE 50 ML/HR: 600; 310; 30; 20 INJECTION, SOLUTION INTRAVENOUS at 14:32

## 2021-09-23 RX ADMIN — PROPOFOL 50 MG: 10 INJECTION, EMULSION INTRAVENOUS at 15:13

## 2021-09-23 RX ADMIN — PROPOFOL 50 MG: 10 INJECTION, EMULSION INTRAVENOUS at 15:17

## 2021-09-23 RX ADMIN — SODIUM CHLORIDE, SODIUM LACTATE, POTASSIUM CHLORIDE, CALCIUM CHLORIDE: 600; 310; 30; 20 INJECTION, SOLUTION INTRAVENOUS at 15:08

## 2021-09-23 RX ADMIN — PROPOFOL 120 MG: 10 INJECTION, EMULSION INTRAVENOUS at 15:12

## 2021-09-23 NOTE — H&P
History and Physical - [address] [employer] [facility]  Elke TOYA [facility] 43 y o  female MRN: [id number]      HPI: Nasrin Tam is a 43y o  year old female who presents for reflux and dysphagia  REVIEW OF SYSTEMS: Per the HPI, and otherwise unremarkable      Historical Information   Past Medical History:   Diagnosis Date    Asthma     Avulsion fracture of lateral malleolus of left fibula 8/4/2020    Depression     last assessed 11/25/13    Elevated blood pressure reading     w/o diagnosis of hypertension- Last assessed 09/15/15    Generalized anxiety disorder     last assessed 12/04/14    Herniated lumbar intervertebral disc     last assessed 03/10/14    Lumbar radiculopathy     last assessed 03/10/14    Lumbosacral spondylosis without myelopathy     last assessed 03/10/14    Mild episode of recurrent major depressive disorder ([facility] [address] ) 4/11/2018    Postpartum anxiety 1/23/2019    Rectal bleeding 7/20/2020     Past Surgical History:   Procedure Laterality Date    BREAST LUMPECTOMY Right     resolved [place] EXTRACTION       Social History   Social History     Substance and Sexual Activity   Alcohol Use Yes    Comment: social     Social History     Substance and Sexual Activity   Drug Use Never     Social History     Tobacco Use   Smoking Status Never Smoker   Smokeless Tobacco Never Used     Family History   Problem Relation Age of Onset    Alcohol abuse Mother     Substance Abuse Mother     Mental illness Mother     Alcohol abuse Father     Substance Abuse Father     Mental illness Father     Ovarian cancer Family     Irritable bowel syndrome Brother     Colon cancer Neg Hx     Colon polyps Neg Hx        Meds/Allergies       Current Outpatient Medications:     Alum Hydroxide-Mag Carbonate (GAVISCON PO)    Cyanocobalamin (B-12) 1000 MCG CAPS    Fluticasone Propionate (FLONASE ALLERGY RELIEF NA)    Multiple Vitamin (MULTIVITAMIN) tablet   pantoprazole (PROTONIX) 20 mg tablet    pseudoephedrine (SUDAFED) 30 mg tablet    albuterol (Ventolin HFA) 90 mcg/act inhaler    cetirizine (ZyrTEC) 10 mg tablet    EPINEPHrine (EPIPEN) 0 3 mg/0 3 mL SOAJ    predniSONE 10 mg tablet    Current Facility-Administered Medications:     lactated ringers infusion, 50 mL/hr, Intravenous, Continuous, 50 mL/hr at 09/23/21 1432    Allergies   Allergen Reactions    Grapeseed Extract [Fish Oil - Food Allergy] Throat Swelling     9/23/2021 patient denies    Morphine And Related Hives    Oxycodone-Acetaminophen Hyperactivity    Raspberry - Food Allergy Hives, Itching and Throat Swelling    Molds & Smuts Hives, Itching and Rash       Objective     /92   Pulse 78   Temp 97 7 °F (36 5 °C)   Resp 22   LMP 09/21/2021   SpO2 100%       PHYSICAL EXAM    Gen: NAD AAOx3  Head: Normocephalic, Atraumatic  CV: S1S2 RRR no m/r/g  CHEST: Clear b/l no c/r/w  ABD: soft, +BS NT/ND no masses  EXT: no edema      ASSESSMENT/PLAN:  This is a 43y o  year old female here for EGD and dilation, and she is stable and optimized for her procedure

## 2021-09-23 NOTE — ANESTHESIA POSTPROCEDURE EVALUATION
Post-Op Assessment Note    CV Status:  Stable    Pain management: adequate     Mental Status:  Sleepy   Hydration Status:  Euvolemic   PONV Controlled:  Controlled   Airway Patency:  Patent      Post Op Vitals Reviewed: Yes      Staff: CRNA         No complications documented      BP   112/66   Temp      Pulse  86   Resp   16   SpO2   98

## 2021-09-23 NOTE — ANESTHESIA PREPROCEDURE EVALUATION
Procedure:  EGD    Relevant Problems   ANESTHESIA   (+) PONV (postoperative nausea and vomiting)      CARDIO (within normal limits)      NEURO/PSYCH   (+) Generalized anxiety disorder      PULMONARY   (+) Exercise-induced asthma (well controlled)   (-) Sleep apnea   (-) Smoking   (-) URI (upper respiratory infection)      Other   (+) Allergic rhinitis    BMI 37    Physical Exam    Airway    Mallampati score: I  TM Distance: >3 FB  Neck ROM: full     Dental   No notable dental hx     Cardiovascular      Pulmonary      Other Findings        Anesthesia Plan  ASA Score- 2     Anesthesia Type- IV sedation with anesthesia with ASA Monitors  Additional Monitors:   Airway Plan:           Plan Factors-Exercise tolerance (METS): >4 METS  Chart reviewed  Existing labs reviewed  Patient summary reviewed  Patient is not a current smoker  Induction- intravenous  Postoperative Plan-     Informed Consent- Anesthetic plan and risks discussed with patient  I personally reviewed this patient with the CRNA  Discussed and agreed on the Anesthesia Plan with the CRNA  Georgette Ormond

## 2021-09-30 ENCOUNTER — TELEPHONE (OUTPATIENT)
Dept: GASTROENTEROLOGY | Facility: CLINIC | Age: 36
End: 2021-09-30

## 2021-09-30 DIAGNOSIS — R13.10 DYSPHAGIA, UNSPECIFIED TYPE: Primary | ICD-10-CM

## 2021-10-06 DIAGNOSIS — R10.11 RUQ PAIN: Primary | ICD-10-CM

## 2021-10-06 RX ORDER — FAMOTIDINE 40 MG/1
40 TABLET, FILM COATED ORAL
Qty: 30 TABLET | Refills: 5 | Status: SHIPPED | OUTPATIENT
Start: 2021-10-06 | End: 2022-07-08

## 2021-10-12 ENCOUNTER — HOSPITAL ENCOUNTER (OUTPATIENT)
Dept: ULTRASOUND IMAGING | Facility: HOSPITAL | Age: 36
Discharge: HOME | End: 2021-10-12
Payer: COMMERCIAL

## 2021-10-12 DIAGNOSIS — R10.11 RUQ PAIN: ICD-10-CM

## 2021-10-12 PROCEDURE — 76705 ECHO EXAM OF ABDOMEN: CPT

## 2021-10-15 ENCOUNTER — TELEPHONE (OUTPATIENT)
Dept: GASTROENTEROLOGY | Facility: CLINIC | Age: 36
End: 2021-10-15

## 2021-10-29 ENCOUNTER — OFFICE VISIT (OUTPATIENT)
Dept: GASTROENTEROLOGY | Facility: CLINIC | Age: 36
End: 2021-10-29
Payer: COMMERCIAL

## 2021-10-29 VITALS
BODY MASS INDEX: 37 KG/M2 | HEIGHT: 66 IN | DIASTOLIC BLOOD PRESSURE: 84 MMHG | SYSTOLIC BLOOD PRESSURE: 128 MMHG | WEIGHT: 230.2 LBS

## 2021-10-29 DIAGNOSIS — K21.9 GASTROESOPHAGEAL REFLUX DISEASE WITHOUT ESOPHAGITIS: ICD-10-CM

## 2021-10-29 DIAGNOSIS — R10.11 RIGHT UPPER QUADRANT ABDOMINAL PAIN: Primary | ICD-10-CM

## 2021-10-29 DIAGNOSIS — R13.19 OTHER DYSPHAGIA: ICD-10-CM

## 2021-10-29 PROCEDURE — 99213 OFFICE O/P EST LOW 20 MIN: CPT | Performed by: NURSE PRACTITIONER

## 2021-10-29 PROCEDURE — 3008F BODY MASS INDEX DOCD: CPT | Performed by: NURSE PRACTITIONER

## 2021-10-29 RX ORDER — DICYCLOMINE HCL 20 MG
20 TABLET ORAL EVERY 6 HOURS
Qty: 120 TABLET | Refills: 3 | Status: SHIPPED | OUTPATIENT
Start: 2021-10-29 | End: 2022-01-28

## 2021-11-07 ENCOUNTER — HOSPITAL ENCOUNTER (OUTPATIENT)
Dept: CT IMAGING | Facility: HOSPITAL | Age: 36
Discharge: HOME | End: 2021-11-07
Payer: COMMERCIAL

## 2021-11-07 DIAGNOSIS — R10.11 RIGHT UPPER QUADRANT ABDOMINAL PAIN: ICD-10-CM

## 2021-11-07 PROCEDURE — 74177 CT ABD & PELVIS W/CONTRAST: CPT

## 2021-11-07 PROCEDURE — G1004 CDSM NDSC: HCPCS

## 2021-11-07 RX ADMIN — IOHEXOL 50 ML: 240 INJECTION, SOLUTION INTRATHECAL; INTRAVASCULAR; INTRAVENOUS; ORAL at 10:00

## 2021-11-07 RX ADMIN — IOHEXOL 100 ML: 350 INJECTION, SOLUTION INTRAVENOUS at 11:50

## 2021-11-09 ENCOUNTER — TELEPHONE (OUTPATIENT)
Dept: GASTROENTEROLOGY | Facility: CLINIC | Age: 36
End: 2021-11-09

## 2021-11-09 ENCOUNTER — HOSPITAL ENCOUNTER (EMERGENCY)
Facility: HOSPITAL | Age: 36
Discharge: HOME | End: 2021-11-09
Attending: EMERGENCY MEDICINE | Admitting: EMERGENCY MEDICINE
Payer: COMMERCIAL

## 2021-11-09 VITALS
OXYGEN SATURATION: 100 % | TEMPERATURE: 97.9 F | DIASTOLIC BLOOD PRESSURE: 80 MMHG | HEART RATE: 78 BPM | SYSTOLIC BLOOD PRESSURE: 135 MMHG | RESPIRATION RATE: 20 BRPM

## 2021-11-09 DIAGNOSIS — R10.11 RUQ ABDOMINAL PAIN: Primary | ICD-10-CM

## 2021-11-09 DIAGNOSIS — R10.11 RIGHT UPPER QUADRANT ABDOMINAL PAIN: Primary | ICD-10-CM

## 2021-11-09 LAB
ALBUMIN SERPL BCP-MCNC: 4.1 G/DL (ref 3.5–5)
ALP SERPL-CCNC: 98 U/L (ref 46–116)
ALT SERPL W P-5'-P-CCNC: 31 U/L (ref 12–78)
ANION GAP SERPL CALCULATED.3IONS-SCNC: 12 MMOL/L (ref 4–13)
AST SERPL W P-5'-P-CCNC: 9 U/L (ref 5–45)
BACTERIA UR QL AUTO: ABNORMAL /HPF
BASOPHILS # BLD AUTO: 0.04 THOUSANDS/ΜL (ref 0–0.1)
BASOPHILS NFR BLD AUTO: 1 % (ref 0–1)
BILIRUB SERPL-MCNC: 0.2 MG/DL (ref 0.2–1)
BILIRUB UR QL STRIP: NEGATIVE
BUN SERPL-MCNC: 8 MG/DL (ref 5–25)
CALCIUM SERPL-MCNC: 9.4 MG/DL (ref 8.3–10.1)
CHLORIDE SERPL-SCNC: 106 MMOL/L (ref 100–108)
CLARITY UR: CLEAR
CO2 SERPL-SCNC: 24 MMOL/L (ref 21–32)
COLOR UR: YELLOW
CREAT SERPL-MCNC: 0.77 MG/DL (ref 0.6–1.3)
EOSINOPHIL # BLD AUTO: 0.11 THOUSAND/ΜL (ref 0–0.61)
EOSINOPHIL NFR BLD AUTO: 1 % (ref 0–6)
ERYTHROCYTE [DISTWIDTH] IN BLOOD BY AUTOMATED COUNT: 13.1 % (ref 11.6–15.1)
EXT PREG TEST URINE: NEGATIVE
EXT. CONTROL ED NAV: NORMAL
GFR SERPL CREATININE-BSD FRML MDRD: 98 ML/MIN/1.73SQ M
GLUCOSE SERPL-MCNC: 90 MG/DL (ref 65–140)
GLUCOSE UR STRIP-MCNC: NEGATIVE MG/DL
HCT VFR BLD AUTO: 40.8 % (ref 34.8–46.1)
HGB BLD-MCNC: 12.8 G/DL (ref 11.5–15.4)
HGB UR QL STRIP.AUTO: ABNORMAL
IMM GRANULOCYTES # BLD AUTO: 0.03 THOUSAND/UL (ref 0–0.2)
IMM GRANULOCYTES NFR BLD AUTO: 0 % (ref 0–2)
KETONES UR STRIP-MCNC: NEGATIVE MG/DL
LEUKOCYTE ESTERASE UR QL STRIP: NEGATIVE
LIPASE SERPL-CCNC: 159 U/L (ref 73–393)
LYMPHOCYTES # BLD AUTO: 2.32 THOUSANDS/ΜL (ref 0.6–4.47)
LYMPHOCYTES NFR BLD AUTO: 30 % (ref 14–44)
MCH RBC QN AUTO: 25.8 PG (ref 26.8–34.3)
MCHC RBC AUTO-ENTMCNC: 31.4 G/DL (ref 31.4–37.4)
MCV RBC AUTO: 82 FL (ref 82–98)
MONOCYTES # BLD AUTO: 0.47 THOUSAND/ΜL (ref 0.17–1.22)
MONOCYTES NFR BLD AUTO: 6 % (ref 4–12)
NEUTROPHILS # BLD AUTO: 4.84 THOUSANDS/ΜL (ref 1.85–7.62)
NEUTS SEG NFR BLD AUTO: 62 % (ref 43–75)
NITRITE UR QL STRIP: NEGATIVE
NON-SQ EPI CELLS URNS QL MICRO: ABNORMAL /HPF
NRBC BLD AUTO-RTO: 0 /100 WBCS
PH UR STRIP.AUTO: 7 [PH]
PLATELET # BLD AUTO: 311 THOUSANDS/UL (ref 149–390)
PMV BLD AUTO: 10.3 FL (ref 8.9–12.7)
POTASSIUM SERPL-SCNC: 3.4 MMOL/L (ref 3.5–5.3)
PROT SERPL-MCNC: 8.1 G/DL (ref 6.4–8.2)
PROT UR STRIP-MCNC: NEGATIVE MG/DL
RBC # BLD AUTO: 4.97 MILLION/UL (ref 3.81–5.12)
RBC #/AREA URNS AUTO: ABNORMAL /HPF
SODIUM SERPL-SCNC: 142 MMOL/L (ref 136–145)
SP GR UR STRIP.AUTO: 1.01 (ref 1–1.03)
UROBILINOGEN UR QL STRIP.AUTO: 0.2 E.U./DL
WBC # BLD AUTO: 7.81 THOUSAND/UL (ref 4.31–10.16)
WBC #/AREA URNS AUTO: ABNORMAL /HPF

## 2021-11-09 PROCEDURE — 96374 THER/PROPH/DIAG INJ IV PUSH: CPT

## 2021-11-09 PROCEDURE — 80053 COMPREHEN METABOLIC PANEL: CPT | Performed by: PHYSICIAN ASSISTANT

## 2021-11-09 PROCEDURE — 81001 URINALYSIS AUTO W/SCOPE: CPT | Performed by: PHYSICIAN ASSISTANT

## 2021-11-09 PROCEDURE — 36415 COLL VENOUS BLD VENIPUNCTURE: CPT | Performed by: PHYSICIAN ASSISTANT

## 2021-11-09 PROCEDURE — 96361 HYDRATE IV INFUSION ADD-ON: CPT

## 2021-11-09 PROCEDURE — 85025 COMPLETE CBC W/AUTO DIFF WBC: CPT | Performed by: PHYSICIAN ASSISTANT

## 2021-11-09 PROCEDURE — 83690 ASSAY OF LIPASE: CPT | Performed by: PHYSICIAN ASSISTANT

## 2021-11-09 PROCEDURE — 99284 EMERGENCY DEPT VISIT MOD MDM: CPT

## 2021-11-09 PROCEDURE — 99284 EMERGENCY DEPT VISIT MOD MDM: CPT | Performed by: PHYSICIAN ASSISTANT

## 2021-11-09 PROCEDURE — 81025 URINE PREGNANCY TEST: CPT | Performed by: PHYSICIAN ASSISTANT

## 2021-11-09 RX ORDER — ONDANSETRON 2 MG/ML
4 INJECTION INTRAMUSCULAR; INTRAVENOUS ONCE
Status: COMPLETED | OUTPATIENT
Start: 2021-11-09 | End: 2021-11-09

## 2021-11-09 RX ADMIN — SODIUM CHLORIDE 1000 ML: 0.9 INJECTION, SOLUTION INTRAVENOUS at 18:21

## 2021-11-09 RX ADMIN — ONDANSETRON 4 MG: 2 INJECTION INTRAMUSCULAR; INTRAVENOUS at 18:21

## 2021-11-10 ENCOUNTER — OFFICE VISIT (OUTPATIENT)
Dept: FAMILY MEDICINE CLINIC | Facility: HOSPITAL | Age: 36
End: 2021-11-10
Payer: COMMERCIAL

## 2021-11-10 VITALS
DIASTOLIC BLOOD PRESSURE: 78 MMHG | HEIGHT: 66 IN | RESPIRATION RATE: 16 BRPM | SYSTOLIC BLOOD PRESSURE: 118 MMHG | BODY MASS INDEX: 36 KG/M2 | HEART RATE: 83 BPM | WEIGHT: 224 LBS | OXYGEN SATURATION: 99 %

## 2021-11-10 DIAGNOSIS — G89.29 CHRONIC RUQ PAIN: ICD-10-CM

## 2021-11-10 DIAGNOSIS — D84.9 IMMUNE DEFICIENCY DISORDER (HCC): ICD-10-CM

## 2021-11-10 DIAGNOSIS — R10.11 CHRONIC RUQ PAIN: ICD-10-CM

## 2021-11-10 DIAGNOSIS — R10.11 ABDOMINAL CRAMPING IN RIGHT UPPER QUADRANT: Primary | ICD-10-CM

## 2021-11-10 DIAGNOSIS — J45.990 EXERCISE-INDUCED ASTHMA: ICD-10-CM

## 2021-11-10 DIAGNOSIS — K21.9 GASTROESOPHAGEAL REFLUX DISEASE, UNSPECIFIED WHETHER ESOPHAGITIS PRESENT: ICD-10-CM

## 2021-11-10 PROCEDURE — 90471 IMMUNIZATION ADMIN: CPT

## 2021-11-10 PROCEDURE — 90732 PPSV23 VACC 2 YRS+ SUBQ/IM: CPT

## 2021-11-10 PROCEDURE — 99214 OFFICE O/P EST MOD 30 MIN: CPT | Performed by: STUDENT IN AN ORGANIZED HEALTH CARE EDUCATION/TRAINING PROGRAM

## 2021-11-10 RX ORDER — PANTOPRAZOLE SODIUM 40 MG/1
TABLET, DELAYED RELEASE ORAL
COMMUNITY
End: 2022-07-08

## 2021-11-12 ENCOUNTER — TELEPHONE (OUTPATIENT)
Dept: GASTROENTEROLOGY | Facility: CLINIC | Age: 36
End: 2021-11-12

## 2021-11-23 ENCOUNTER — HOSPITAL ENCOUNTER (OUTPATIENT)
Dept: NUCLEAR MEDICINE | Facility: HOSPITAL | Age: 36
Discharge: HOME | End: 2021-11-23
Payer: COMMERCIAL

## 2021-11-23 DIAGNOSIS — R10.11 RIGHT UPPER QUADRANT ABDOMINAL PAIN: ICD-10-CM

## 2021-11-23 PROCEDURE — A9537 TC99M MEBROFENIN: HCPCS

## 2021-11-23 PROCEDURE — G1004 CDSM NDSC: HCPCS

## 2021-11-23 PROCEDURE — 78227 HEPATOBIL SYST IMAGE W/DRUG: CPT

## 2021-11-23 RX ADMIN — SINCALIDE 2 MCG: 5 INJECTION, POWDER, LYOPHILIZED, FOR SOLUTION INTRAVENOUS at 13:00

## 2021-11-24 ENCOUNTER — TELEPHONE (OUTPATIENT)
Dept: GASTROENTEROLOGY | Facility: CLINIC | Age: 36
End: 2021-11-24

## 2021-11-24 DIAGNOSIS — R10.11 RUQ PAIN: Primary | ICD-10-CM

## 2021-12-01 ENCOUNTER — CONSULT (OUTPATIENT)
Dept: SURGERY | Facility: CLINIC | Age: 36
End: 2021-12-01
Payer: COMMERCIAL

## 2021-12-01 VITALS
DIASTOLIC BLOOD PRESSURE: 90 MMHG | HEART RATE: 73 BPM | TEMPERATURE: 97.7 F | BODY MASS INDEX: 36.52 KG/M2 | SYSTOLIC BLOOD PRESSURE: 139 MMHG | WEIGHT: 226.25 LBS

## 2021-12-01 DIAGNOSIS — R10.11 RUQ PAIN: ICD-10-CM

## 2021-12-01 DIAGNOSIS — K82.8 BILIARY DYSKINESIA: Primary | ICD-10-CM

## 2021-12-01 DIAGNOSIS — K21.9 GASTROESOPHAGEAL REFLUX DISEASE WITHOUT ESOPHAGITIS: ICD-10-CM

## 2021-12-01 PROCEDURE — 99245 OFF/OP CONSLTJ NEW/EST HI 55: CPT | Performed by: SURGERY

## 2021-12-07 ENCOUNTER — ANESTHESIA EVENT (OUTPATIENT)
Dept: PERIOP | Facility: HOSPITAL | Age: 36
End: 2021-12-07
Payer: COMMERCIAL

## 2021-12-08 ENCOUNTER — HOSPITAL ENCOUNTER (OUTPATIENT)
Facility: HOSPITAL | Age: 36
Setting detail: OUTPATIENT SURGERY
Discharge: HOME | End: 2021-12-08
Attending: SURGERY | Admitting: SURGERY
Payer: COMMERCIAL

## 2021-12-08 ENCOUNTER — HOSPITAL ENCOUNTER (OUTPATIENT)
Dept: RADIOLOGY | Facility: HOSPITAL | Age: 36
Setting detail: OUTPATIENT SURGERY
Discharge: HOME | End: 2021-12-08
Payer: COMMERCIAL

## 2021-12-08 ENCOUNTER — ANESTHESIA (OUTPATIENT)
Dept: PERIOP | Facility: HOSPITAL | Age: 36
End: 2021-12-08
Payer: COMMERCIAL

## 2021-12-08 VITALS
BODY MASS INDEX: 35.2 KG/M2 | RESPIRATION RATE: 16 BRPM | DIASTOLIC BLOOD PRESSURE: 71 MMHG | WEIGHT: 219 LBS | HEIGHT: 66 IN | HEART RATE: 54 BPM | TEMPERATURE: 97.1 F | SYSTOLIC BLOOD PRESSURE: 117 MMHG | OXYGEN SATURATION: 100 %

## 2021-12-08 DIAGNOSIS — K82.8 BILIARY DYSKINESIA: Primary | ICD-10-CM

## 2021-12-08 DIAGNOSIS — K42.9 UMBILICAL HERNIA WITHOUT OBSTRUCTION OR GANGRENE: ICD-10-CM

## 2021-12-08 DIAGNOSIS — K82.8 BILIARY DYSKINESIA: ICD-10-CM

## 2021-12-08 LAB
EXT PREGNANCY TEST URINE: NEGATIVE
EXT. CONTROL: NORMAL

## 2021-12-08 PROCEDURE — 81025 URINE PREGNANCY TEST: CPT | Performed by: SURGERY

## 2021-12-08 PROCEDURE — 88304 TISSUE EXAM BY PATHOLOGIST: CPT | Performed by: SPECIALIST

## 2021-12-08 PROCEDURE — 47562 LAPAROSCOPIC CHOLECYSTECTOMY: CPT | Performed by: SURGERY

## 2021-12-08 RX ORDER — CEFAZOLIN SODIUM 1 G/3ML
INJECTION, POWDER, FOR SOLUTION INTRAMUSCULAR; INTRAVENOUS AS NEEDED
Status: DISCONTINUED | OUTPATIENT
Start: 2021-12-08 | End: 2021-12-08

## 2021-12-08 RX ORDER — SODIUM CHLORIDE, SODIUM LACTATE, POTASSIUM CHLORIDE, CALCIUM CHLORIDE 600; 310; 30; 20 MG/100ML; MG/100ML; MG/100ML; MG/100ML
50 INJECTION, SOLUTION INTRAVENOUS CONTINUOUS
Status: DISCONTINUED | OUTPATIENT
Start: 2021-12-08 | End: 2021-12-08 | Stop reason: HOSPADM

## 2021-12-08 RX ORDER — KETOROLAC TROMETHAMINE 30 MG/ML
INJECTION, SOLUTION INTRAMUSCULAR; INTRAVENOUS AS NEEDED
Status: DISCONTINUED | OUTPATIENT
Start: 2021-12-08 | End: 2021-12-08

## 2021-12-08 RX ORDER — CEFAZOLIN SODIUM 2 G/50ML
2000 SOLUTION INTRAVENOUS ONCE
Status: DISCONTINUED | OUTPATIENT
Start: 2021-12-08 | End: 2021-12-08 | Stop reason: HOSPADM

## 2021-12-08 RX ORDER — BUPIVACAINE HYDROCHLORIDE 2.5 MG/ML
INJECTION, SOLUTION EPIDURAL; INFILTRATION; INTRACAUDAL AS NEEDED
Status: DISCONTINUED | OUTPATIENT
Start: 2021-12-08 | End: 2021-12-08 | Stop reason: HOSPADM

## 2021-12-08 RX ORDER — LIDOCAINE HYDROCHLORIDE 10 MG/ML
0.5 INJECTION, SOLUTION EPIDURAL; INFILTRATION; INTRACAUDAL; PERINEURAL ONCE AS NEEDED
Status: DISCONTINUED | OUTPATIENT
Start: 2021-12-08 | End: 2021-12-08 | Stop reason: HOSPADM

## 2021-12-08 RX ORDER — LIDOCAINE HYDROCHLORIDE 10 MG/ML
INJECTION, SOLUTION EPIDURAL; INFILTRATION; INTRACAUDAL; PERINEURAL AS NEEDED
Status: DISCONTINUED | OUTPATIENT
Start: 2021-12-08 | End: 2021-12-08

## 2021-12-08 RX ORDER — PROMETHAZINE HYDROCHLORIDE 25 MG/ML
25 INJECTION, SOLUTION INTRAMUSCULAR; INTRAVENOUS ONCE AS NEEDED
Status: COMPLETED | OUTPATIENT
Start: 2021-12-08 | End: 2021-12-08

## 2021-12-08 RX ORDER — NEOSTIGMINE METHYLSULFATE 1 MG/ML
INJECTION INTRAVENOUS AS NEEDED
Status: DISCONTINUED | OUTPATIENT
Start: 2021-12-08 | End: 2021-12-08

## 2021-12-08 RX ORDER — FENTANYL CITRATE 50 UG/ML
INJECTION, SOLUTION INTRAMUSCULAR; INTRAVENOUS AS NEEDED
Status: DISCONTINUED | OUTPATIENT
Start: 2021-12-08 | End: 2021-12-08

## 2021-12-08 RX ORDER — ONDANSETRON 2 MG/ML
INJECTION INTRAMUSCULAR; INTRAVENOUS AS NEEDED
Status: DISCONTINUED | OUTPATIENT
Start: 2021-12-08 | End: 2021-12-08

## 2021-12-08 RX ORDER — TRAMADOL HYDROCHLORIDE 50 MG/1
50 TABLET ORAL EVERY 6 HOURS PRN
Qty: 20 TABLET | Refills: 0 | Status: SHIPPED | OUTPATIENT
Start: 2021-12-08 | End: 2021-12-18

## 2021-12-08 RX ORDER — GLYCOPYRROLATE 0.2 MG/ML
INJECTION INTRAMUSCULAR; INTRAVENOUS AS NEEDED
Status: DISCONTINUED | OUTPATIENT
Start: 2021-12-08 | End: 2021-12-08

## 2021-12-08 RX ORDER — MIDAZOLAM HYDROCHLORIDE 2 MG/2ML
INJECTION, SOLUTION INTRAMUSCULAR; INTRAVENOUS AS NEEDED
Status: DISCONTINUED | OUTPATIENT
Start: 2021-12-08 | End: 2021-12-08

## 2021-12-08 RX ORDER — SODIUM CHLORIDE, SODIUM LACTATE, POTASSIUM CHLORIDE, CALCIUM CHLORIDE 600; 310; 30; 20 MG/100ML; MG/100ML; MG/100ML; MG/100ML
125 INJECTION, SOLUTION INTRAVENOUS CONTINUOUS
Status: DISCONTINUED | OUTPATIENT
Start: 2021-12-08 | End: 2021-12-08 | Stop reason: HOSPADM

## 2021-12-08 RX ORDER — ROCURONIUM BROMIDE 10 MG/ML
INJECTION, SOLUTION INTRAVENOUS AS NEEDED
Status: DISCONTINUED | OUTPATIENT
Start: 2021-12-08 | End: 2021-12-08

## 2021-12-08 RX ORDER — METOCLOPRAMIDE HYDROCHLORIDE 5 MG/ML
10 INJECTION INTRAMUSCULAR; INTRAVENOUS ONCE AS NEEDED
Status: COMPLETED | OUTPATIENT
Start: 2021-12-08 | End: 2021-12-08

## 2021-12-08 RX ORDER — ONDANSETRON 2 MG/ML
4 INJECTION INTRAMUSCULAR; INTRAVENOUS EVERY 6 HOURS PRN
Status: DISCONTINUED | OUTPATIENT
Start: 2021-12-08 | End: 2021-12-08 | Stop reason: HOSPADM

## 2021-12-08 RX ORDER — FENTANYL CITRATE/PF 50 MCG/ML
25 SYRINGE (ML) INJECTION
Status: DISCONTINUED | OUTPATIENT
Start: 2021-12-08 | End: 2021-12-08 | Stop reason: HOSPADM

## 2021-12-08 RX ORDER — PROPOFOL 10 MG/ML
INJECTION, EMULSION INTRAVENOUS AS NEEDED
Status: DISCONTINUED | OUTPATIENT
Start: 2021-12-08 | End: 2021-12-08

## 2021-12-08 RX ORDER — CYCLOBENZAPRINE HCL 10 MG
10 TABLET ORAL 3 TIMES DAILY PRN
Qty: 21 TABLET | Refills: 0 | Status: SHIPPED | OUTPATIENT
Start: 2021-12-08 | End: 2022-01-28

## 2021-12-08 RX ORDER — DEXAMETHASONE SODIUM PHOSPHATE 10 MG/ML
INJECTION, SOLUTION INTRAMUSCULAR; INTRAVENOUS AS NEEDED
Status: DISCONTINUED | OUTPATIENT
Start: 2021-12-08 | End: 2021-12-08

## 2021-12-08 RX ORDER — HYDROMORPHONE HCL IN WATER/PF 6 MG/30 ML
0.2 PATIENT CONTROLLED ANALGESIA SYRINGE INTRAVENOUS
Status: DISCONTINUED | OUTPATIENT
Start: 2021-12-08 | End: 2021-12-08 | Stop reason: HOSPADM

## 2021-12-08 RX ADMIN — SODIUM CHLORIDE, SODIUM LACTATE, POTASSIUM CHLORIDE, AND CALCIUM CHLORIDE: .6; .31; .03; .02 INJECTION, SOLUTION INTRAVENOUS at 12:24

## 2021-12-08 RX ADMIN — METOCLOPRAMIDE HYDROCHLORIDE 10 MG: 5 INJECTION INTRAMUSCULAR; INTRAVENOUS at 14:19

## 2021-12-08 RX ADMIN — ROCURONIUM BROMIDE 50 MG: 50 INJECTION, SOLUTION INTRAVENOUS at 12:28

## 2021-12-08 RX ADMIN — Medication 25 MCG: at 14:15

## 2021-12-08 RX ADMIN — DEXAMETHASONE SODIUM PHOSPHATE 4 MG: 10 INJECTION, SOLUTION INTRAMUSCULAR; INTRAVENOUS at 12:35

## 2021-12-08 RX ADMIN — KETOROLAC TROMETHAMINE 30 MG: 30 INJECTION, SOLUTION INTRAMUSCULAR; INTRAVENOUS at 13:48

## 2021-12-08 RX ADMIN — Medication 25 MCG: at 14:32

## 2021-12-08 RX ADMIN — FENTANYL CITRATE 50 MCG: 50 INJECTION INTRAMUSCULAR; INTRAVENOUS at 12:28

## 2021-12-08 RX ADMIN — GLYCOPYRROLATE 0.2 MG: 0.2 INJECTION, SOLUTION INTRAMUSCULAR; INTRAVENOUS at 13:55

## 2021-12-08 RX ADMIN — GLYCOPYRROLATE 0.4 MG: 0.2 INJECTION, SOLUTION INTRAMUSCULAR; INTRAVENOUS at 13:49

## 2021-12-08 RX ADMIN — HYDROMORPHONE HYDROCHLORIDE 0.2 MG: 0.2 INJECTION, SOLUTION INTRAMUSCULAR; INTRAVENOUS; SUBCUTANEOUS at 14:53

## 2021-12-08 RX ADMIN — FENTANYL CITRATE 25 MCG: 50 INJECTION INTRAMUSCULAR; INTRAVENOUS at 13:23

## 2021-12-08 RX ADMIN — CEFAZOLIN SODIUM 2000 MG: 1 INJECTION, POWDER, FOR SOLUTION INTRAMUSCULAR; INTRAVENOUS at 12:35

## 2021-12-08 RX ADMIN — MIDAZOLAM 2 MG: 1 INJECTION INTRAMUSCULAR; INTRAVENOUS at 12:22

## 2021-12-08 RX ADMIN — ONDANSETRON 4 MG: 2 INJECTION INTRAMUSCULAR; INTRAVENOUS at 13:48

## 2021-12-08 RX ADMIN — LIDOCAINE HYDROCHLORIDE 50 MG: 10 INJECTION, SOLUTION EPIDURAL; INFILTRATION; INTRACAUDAL; PERINEURAL at 12:28

## 2021-12-08 RX ADMIN — ONDANSETRON 4 MG: 2 INJECTION INTRAMUSCULAR; INTRAVENOUS at 14:07

## 2021-12-08 RX ADMIN — PROMETHAZINE HYDROCHLORIDE 25 MG: 25 INJECTION INTRAMUSCULAR; INTRAVENOUS at 14:54

## 2021-12-08 RX ADMIN — SODIUM CHLORIDE, SODIUM LACTATE, POTASSIUM CHLORIDE, AND CALCIUM CHLORIDE: .6; .31; .03; .02 INJECTION, SOLUTION INTRAVENOUS at 13:50

## 2021-12-08 RX ADMIN — FENTANYL CITRATE 25 MCG: 50 INJECTION INTRAMUSCULAR; INTRAVENOUS at 14:03

## 2021-12-08 RX ADMIN — PROPOFOL 200 MG: 10 INJECTION, EMULSION INTRAVENOUS at 12:28

## 2021-12-08 RX ADMIN — NEOSTIGMINE METHYLSULFATE 3 MG: 1 INJECTION INTRAVENOUS at 13:49

## 2021-12-22 ENCOUNTER — TELEMEDICINE (OUTPATIENT)
Dept: FAMILY MEDICINE CLINIC | Facility: HOSPITAL | Age: 36
End: 2021-12-22
Payer: COMMERCIAL

## 2021-12-22 ENCOUNTER — OFFICE VISIT (OUTPATIENT)
Dept: SURGERY | Facility: CLINIC | Age: 36
End: 2021-12-22

## 2021-12-22 VITALS
WEIGHT: 222.25 LBS | BODY MASS INDEX: 35.87 KG/M2 | HEART RATE: 90 BPM | SYSTOLIC BLOOD PRESSURE: 144 MMHG | TEMPERATURE: 96.9 F | DIASTOLIC BLOOD PRESSURE: 86 MMHG

## 2021-12-22 DIAGNOSIS — Z90.49 S/P LAPAROSCOPIC CHOLECYSTECTOMY: Primary | ICD-10-CM

## 2021-12-22 DIAGNOSIS — J01.10 ACUTE NON-RECURRENT FRONTAL SINUSITIS: Primary | ICD-10-CM

## 2021-12-22 PROBLEM — K82.8 BILIARY DYSKINESIA: Status: RESOLVED | Noted: 2021-12-01 | Resolved: 2021-12-22

## 2021-12-22 PROCEDURE — 99024 POSTOP FOLLOW-UP VISIT: CPT | Performed by: SURGERY

## 2021-12-22 PROCEDURE — 99213 OFFICE O/P EST LOW 20 MIN: CPT | Performed by: STUDENT IN AN ORGANIZED HEALTH CARE EDUCATION/TRAINING PROGRAM

## 2021-12-22 PROCEDURE — 1036F TOBACCO NON-USER: CPT | Performed by: STUDENT IN AN ORGANIZED HEALTH CARE EDUCATION/TRAINING PROGRAM

## 2021-12-22 RX ORDER — AMOXICILLIN AND CLAVULANATE POTASSIUM 875; 125 MG/1; MG/1
1 TABLET, FILM COATED ORAL EVERY 12 HOURS SCHEDULED
Qty: 20 TABLET | Refills: 0 | Status: SHIPPED | OUTPATIENT
Start: 2021-12-22 | End: 2022-01-01

## 2022-01-28 ENCOUNTER — OFFICE VISIT (OUTPATIENT)
Dept: GASTROENTEROLOGY | Facility: CLINIC | Age: 37
End: 2022-01-28
Payer: COMMERCIAL

## 2022-01-28 VITALS
SYSTOLIC BLOOD PRESSURE: 138 MMHG | BODY MASS INDEX: 36.64 KG/M2 | HEIGHT: 66 IN | DIASTOLIC BLOOD PRESSURE: 86 MMHG | WEIGHT: 228 LBS | HEART RATE: 84 BPM

## 2022-01-28 DIAGNOSIS — R13.19 OTHER DYSPHAGIA: ICD-10-CM

## 2022-01-28 DIAGNOSIS — K21.9 GASTROESOPHAGEAL REFLUX DISEASE WITHOUT ESOPHAGITIS: ICD-10-CM

## 2022-01-28 DIAGNOSIS — K81.1 CHRONIC CHOLECYSTITIS: Primary | ICD-10-CM

## 2022-01-28 PROCEDURE — 99214 OFFICE O/P EST MOD 30 MIN: CPT | Performed by: INTERNAL MEDICINE

## 2022-01-28 NOTE — PROGRESS NOTES
[facility] [facility] - Outpatient Follow-up Note  Amaury Moya 43 y o  female MRN: [id number]  Encounter: [id number]    ASSESSMENT AND PLAN:      1  Chronic cholecystitis  55-year-old female here today for follow-up  Doing much better status post cholecystectomy 7 weeks ago  Eating better and gaining weight  Since the pain is improved, will discontinue Bentyl  2  Gastroesophageal reflux disease without esophagitis  With a little bit of bile reflux, but overall this is still improved  Once the Bentyl was taken off, will try to drop the pantoprazole to 20 mg daily, with hopes to eventually taking it off  GERD lifestyle modifications discussed  3  Other dysphagia  Resolved      Followup Appointment:  6 months  ______________________________________________________________________    Chief Complaint   Patient presents with    Follow up to having gallbaldder removed     HPI:  55-year-old female here today for follow-up  Had her gallbladder taken out 7 weeks ago  Her symptoms of abdominal pain nausea and dysphagia has significantly improved  Did have some diarrhea postop but this is improved as well  She is gaining weight and able to eat better  She is pleased with the results      Historical Information   Past Medical History:   Diagnosis Date    Anemia 2007    Pernicious anemia    Asthma     Avulsion fracture of lateral malleolus of left fibula 8/4/2020    Biliary dyskinesia 12/1/2021    Depression     last assessed 11/25/13    Elevated blood pressure reading     w/o diagnosis of hypertension- Last assessed 09/15/15    Generalized anxiety disorder     last assessed 12/04/14    GERD (gastroesophageal reflux disease) April 2021    Herniated lumbar intervertebral disc     last assessed 03/10/14    Lumbar radiculopathy     last assessed 03/10/14    Lumbosacral spondylosis without myelopathy     last assessed 03/10/14    Mild episode of recurrent major depressive disorder ([facility] [address] ) 2018    PONV (postoperative nausea and vomiting)     Postpartum anxiety 2019    Rectal bleeding 2020     Past Surgical History:   Procedure Laterality Date    BREAST LUMPECTOMY Right     resolved      SECTION      CHOLECYSTECTOMY      MI LAP,CHOLECYSTECTOMY N/A 2021    Procedure: CHOLECYSTECTOMY LAPAROSCOPIC;  Surgeon: Chris Forde MD;  Location: [facility];  Service: General    MI REPAIR UMBILICAL CSRV,2+G/B,VRIGL N/A 2021    Procedure: REPAIR HERNIA UMBILICAL;  Surgeon:  Chris Forde MD;  Location: [facility];  Service: General    TOENAIL EXCISION      TOOTH EXTRACTION       Social History     Substance and Sexual Activity   Alcohol Use Not Currently    Alcohol/week: 0 0 standard drinks    Comment: Was drinking up to/ aprox 3 drinks a week prior to april     Social History     Substance and Sexual Activity   Drug Use Never     Social History     Tobacco Use   Smoking Status Never Smoker   Smokeless Tobacco Never Used     Family History   Problem Relation Age of Onset    Alcohol abuse Mother     Substance Abuse Mother     Mental illness Mother         Depression, anxiety, ptsd    Addiction problem Mother         Alcohol    Depression Mother         Depression    Alcohol abuse Father     Substance Abuse Father     Mental illness Father         Possible bipolar disorder, never disclosed    Addiction problem Father         Not biological father - drugs and alcohol    Early death Father         Suicide     Ovarian cancer Family     Irritable bowel syndrome Brother     Addiction problem Maternal Grandmother         Alcohol    Cancer Maternal Grandmother         I believe it was in her reproductive system    Addiction problem Maternal Grandfather         Alcohol    Diabetes Maternal Grandfather     Irritable bowel syndrome Brother         Ibs-d    Colon cancer Neg Hx     Colon polyps Neg Hx          Current Outpatient Medications:   albuterol (Ventolin HFA) 90 mcg/act inhaler    Cyanocobalamin (B-12) 1000 MCG CAPS    EPINEPHrine (EPIPEN) 0 3 mg/0 3 mL SOAJ    famotidine (PEPCID) 40 MG tablet    Fluticasone Propionate (FLONASE ALLERGY RELIEF NA)    Multiple Vitamin (MULTIVITAMIN) tablet    pantoprazole (PROTONIX) 40 mg tablet  Allergies   Allergen Reactions    Morphine And Related Hives    Oxycodone-Acetaminophen Hyperactivity    Raspberry - Food Allergy Hives, Itching and Throat Swelling    Molds & Smuts Hives, Itching and Rash     Reviewed medications and allergies and updated as indicated    PHYSICAL EXAM:    Blood pressure 138/86, pulse 84, height 5' 6" (1 676 m), weight 103 kg (228 lb)  Body mass index is 36 8 kg/m²  General Appearance: NAD, cooperative, alert  Eyes: Anicteric, PERRLA, EOMI  ENT:  Normocephalic, atraumatic, normal mucosa  Neck:  Supple, symmetrical, trachea midline  Resp:  Clear to auscultation bilaterally; no rales, rhonchi or wheezing; respirations unlabored   CV:  S1 S2, Regular rate and rhythm; no murmur, rub, or gallop  GI:  Soft, non-tender, non-distended; normal bowel sounds; no masses, no organomegaly   Rectal: Deferred  Musculoskeletal: No cyanosis, clubbing or edema  Normal ROM    Skin:  No jaundice, rashes, or lesions   Heme/Lymph: No palpable cervical lymphadenopathy  Psych: Normal affect, good eye contact  Neuro: No gross deficits, AAOx3    Lab Results:   Lab Results   Component Value Date    WBC 7 81 11/09/2021    HGB 12 8 11/09/2021    HCT 40 8 11/09/2021    MCV 82 11/09/2021     11/09/2021     Lab Results   Component Value Date     (L) 11/29/2017    K 3 4 (L) 11/09/2021     11/09/2021    CO2 24 11/09/2021    BUN 8 11/09/2021    CREATININE 0 77 11/09/2021    CALCIUM 9 4 11/09/2021    AST 9 11/09/2021    ALT 31 11/09/2021    ALKPHOS 98 11/09/2021    PROT 6 9 11/29/2017    BILITOT 0 4 11/29/2017    EGFR 98 11/09/2021     No results found for: IRON, TIBC, FERRITIN  Lab Results   Component Value Date    LIPASE 159 11/09/2021       Radiology Results:   No results found

## 2022-01-28 NOTE — PATIENT INSTRUCTIONS
GERD (Gastroesophageal Reflux Disease)   WHAT YOU NEED TO KNOW:   Gastroesophageal reflux disease (GERD) is reflux that occurs more than twice a week for a few weeks  Reflux means acid and food in the stomach back up into the esophagus  It usually causes heartburn and other symptoms  GERD can cause other health problems over time if it is not treated  DISCHARGE INSTRUCTIONS:   Call your local emergency number (911 in the [address] [facility]) if:   · You have severe chest pain and sudden trouble breathing  Return to the emergency department if:   · You have trouble breathing after you vomit  · You have trouble swallowing, or pain with swallowing  · Your bowel movements are black, bloody, or tarry-looking  · Your vomit looks like coffee grounds or has blood in it  Call your doctor or gastroenterologist if:   · You feel full and cannot burp or vomit  · You vomit large amounts, or you vomit often  · You are losing weight without trying  · Your symptoms get worse or do not improve with treatment  · You have questions or concerns about your condition or care  Medicines:   · Medicines  are used to decrease stomach acid  Medicine may also be used to help your lower esophageal sphincter and stomach contract (tighten) more  · Take your medicine as directed  Contact your healthcare provider if you think your medicine is not helping or if you have side effects  Tell him of her if you are allergic to any medicine  Keep a list of the medicines, vitamins, and herbs you take  Include the amounts, and when and why you take them  Bring the list or the pill bottles to follow-up visits  Carry your medicine list with you in case of an emergency  Manage GERD:       · Do not have foods or drinks that may increase heartburn  These include chocolate, peppermint, fried or fatty foods, drinks that contain caffeine, or carbonated drinks (soda)   Other foods include spicy foods, onions, tomatoes, and tomato-based foods  Do not have foods or drinks that can irritate your esophagus, such as citrus fruits, juices, and alcohol  · Do not eat large meals  When you eat a lot of food at one time, your stomach needs more acid to digest it  Eat 6 small meals each day instead of 3 large ones, and eat slowly  Do not eat meals 2 to 3 hours before bedtime  · Elevate the head of your bed  Place 6-inch blocks under the head of your bed frame  You may also use more than one pillow under your head and shoulders while you sleep  · Maintain a healthy weight  If you are overweight, weight loss may help relieve symptoms of GERD  · Do not smoke  Smoking weakens the lower esophageal sphincter and increases the risk of GERD  Ask your healthcare provider for information if you currently smoke and need help to quit  E-cigarettes or smokeless tobacco still contain nicotine  Talk to your healthcare provider before you use these products  · Do not wear clothing that is tight around your waist   Tight clothing can put pressure on your stomach and cause or worsen GERD symptoms  Follow up with your doctor or gastroenterologist as directed:  Write down your questions so you remember to ask them during your visits  © Copyright [employer] 2021 Information is for End User's use only and may not be sold, redistributed or otherwise used for commercial purposes  All illustrations and images included in CareNotes® are the copyrighted property of [employer]  or Tito Calderón  The above information is an  only  It is not intended as medical advice for individual conditions or treatments  Talk to your doctor, nurse or pharmacist before following any medical regimen to see if it is safe and effective for you

## 2022-01-31 ENCOUNTER — OFFICE VISIT (OUTPATIENT)
Dept: OBGYN CLINIC | Facility: CLINIC | Age: 37
End: 2022-01-31
Payer: COMMERCIAL

## 2022-01-31 VITALS
DIASTOLIC BLOOD PRESSURE: 78 MMHG | HEIGHT: 65 IN | BODY MASS INDEX: 38.05 KG/M2 | SYSTOLIC BLOOD PRESSURE: 138 MMHG | WEIGHT: 228.4 LBS

## 2022-01-31 DIAGNOSIS — Z01.419 ENCOUNTER FOR GYNECOLOGICAL EXAMINATION WITHOUT ABNORMAL FINDING: Primary | ICD-10-CM

## 2022-01-31 DIAGNOSIS — Z12.4 ENCOUNTER FOR PAPANICOLAOU SMEAR FOR CERVICAL CANCER SCREENING: ICD-10-CM

## 2022-01-31 PROCEDURE — 1036F TOBACCO NON-USER: CPT | Performed by: OBSTETRICS & GYNECOLOGY

## 2022-01-31 PROCEDURE — G0124 SCREEN C/V THIN LAYER BY MD: HCPCS | Performed by: SPECIALIST

## 2022-01-31 PROCEDURE — G0145 SCR C/V CYTO,THINLAYER,RESCR: HCPCS | Performed by: SPECIALIST

## 2022-01-31 PROCEDURE — 99385 PREV VISIT NEW AGE 18-39: CPT | Performed by: OBSTETRICS & GYNECOLOGY

## 2022-01-31 PROCEDURE — G0476 HPV COMBO ASSAY CA SCREEN: HCPCS | Performed by: OBSTETRICS & GYNECOLOGY

## 2022-01-31 PROCEDURE — 3008F BODY MASS INDEX DOCD: CPT | Performed by: OBSTETRICS & GYNECOLOGY

## 2022-01-31 NOTE — PROGRESS NOTES
CC:  Annual exam    HPI: Mario Malcolm presents for routine annual gyn exam today  The patient is doing well overall and has no complaints or concerns at this time  Her cycles are regular and she uses a ParaGard IUD for contraception  Her last Pap smear was  and it was normal     Past Medical History:  Past Medical History:   Diagnosis Date    Anemia 2007    Pernicious anemia    Asthma     Avulsion fracture of lateral malleolus of left fibula 2020    Biliary dyskinesia 2021    Depression     last assessed 13    Elevated blood pressure reading     w/o diagnosis of hypertension- Last assessed 09/15/15    Generalized anxiety disorder     last assessed 14    GERD (gastroesophageal reflux disease) 2021    Herniated lumbar intervertebral disc     last assessed 03/10/14    Lumbar radiculopathy     last assessed 03/10/14    Lumbosacral spondylosis without myelopathy     last assessed 03/10/14    Mild episode of recurrent major depressive disorder ([facility] [address] ) 2018    PONV (postoperative nausea and vomiting)     Postpartum anxiety 2019    Rectal bleeding 2020       Past Surgical History:  Past Surgical History:   Procedure Laterality Date    BREAST LUMPECTOMY Right     resolved      SECTION      CHOLECYSTECTOMY      GA LAP,CHOLECYSTECTOMY N/A 2021    Procedure: CHOLECYSTECTOMY LAPAROSCOPIC;  Surgeon: Triston Coronado MD;  [facility];  Service: General    GA REPAIR UMBILICAL TUUP,1+E/L,PTOXL N/A 2021    Procedure: REPAIR HERNIA UMBILICAL;  Surgeon:  Triston Coronado MD;  [facility];  Service: General    TOENAIL EXCISION      TOOTH EXTRACTION         Past OB/Gyn History:  Noncontributory    ALLERGIES:   Allergies   Allergen Reactions    Morphine And Related Hives    Oxycodone-Acetaminophen Hyperactivity    Raspberry - Food Allergy Hives, Itching and Throat Swelling    Molds & Smuts Hives, Itching and Rash       MEDS: Current Outpatient Medications:     albuterol (Ventolin HFA) 90 mcg/act inhaler    Cyanocobalamin (B-12) 1000 MCG CAPS    famotidine (PEPCID) 40 MG tablet    Fluticasone Propionate (FLONASE ALLERGY RELIEF NA)    Multiple Vitamin (MULTIVITAMIN) tablet    pantoprazole (PROTONIX) 40 mg tablet    EPINEPHrine (EPIPEN) 0 3 mg/0 3 mL SOAJ    Family History:  Family History   Problem Relation Age of Onset    Alcohol abuse Mother     Substance Abuse Mother     Mental illness Mother         Depression, anxiety, ptsd    Addiction problem Mother         Alcohol    Depression Mother         Depression    Alcohol abuse Father     Substance Abuse Father     Mental illness Father         Possible bipolar disorder, never disclosed    Addiction problem Father         Not biological father - drugs and alcohol    Early death Father         Suicide 2012    Ovarian cancer Family     Irritable bowel syndrome Brother     Addiction problem Maternal Grandmother         Alcohol    Cancer Maternal Grandmother         I believe it was in her reproductive system    Ovarian cancer Maternal Grandmother         She also had a hysterectomy but it was not cancer related    Addiction problem Maternal Grandfather         Alcohol    Diabetes Maternal Grandfather     Irritable bowel syndrome Brother         Ibs-d    Colon cancer Neg Hx     Colon polyps Neg Hx        Social History:  Social History     Socioeconomic History    Marital status: /Civil Union     Spouse name: Not on file    Number of children: Not on file    Years of education: Not on file    Highest education level: Not on file   Occupational History    Occupation:    Tobacco Use    Smoking status: Never Smoker    Smokeless tobacco: Never Used   Vaping Use    Vaping Use: Never used   Substance and Sexual Activity    Alcohol use: Not Currently     Alcohol/week: 0 0 standard drinks     Comment: Was drinking up to/ aprox 3 drinks a week prior to april    Drug use: Never    Sexual activity: Yes     Partners: Male     Birth control/protection: I U D  Other Topics Concern    Not on file   Social History Narrative    Lives with  their child  Feels safe at home    Sees dentist reg    Has living will      Social Determinants of Health     Financial Resource Strain: Not on file   Food Insecurity: Not on file   Transportation Needs: Not on file   Physical Activity: Not on file   Stress: Not on file   Social Connections: Not on file   Intimate Partner Violence: Not on file   Housing Stability: Not on file         Review of Systems:  Gen:   Denies fatigue, chills, nausea, vomiting, fever  Skin: No rashes or discolorations of any concern  RESP: Denies SOB, no cough  CV: Denies chest pain or palpitations  Breasts: Denies masses, pain, skin changes and nipple discharge  GI: Denies abdominal pain, heartburn, nausea, vomiting, changes in bowel habits  : Denies dysuria, frequency, CVA tenderness, incontinence and hematuria  Genitalia: Denies abnormal vaginal discharge, external lesions, rashes, pelvic pain, pressure, abnormal bleeding  Rectal:  Denies pain, bleeding, hemorrhoids,    Physical Exam:  /78   Ht 5' 5" (1 651 m)   Wt 104 kg (228 lb 6 4 oz)   LMP 01/21/2022   BMI 38 01 kg/m²    Gen: The patient was alert and oriented x3, pleasant well-appearing female in no acute distress  Neck:  Unremarkable, no lymphadenopathy, no thyromegaly, or tenderness  CV:  RRR, no murmurs  Resp:  Clear to auscultation bilaterally, no wheezing  Breasts: Symmetric  No dominant, discrete, fixed  or suspicious masses are noted  No skin or nipple changes  No palpable axillary nodes  No supraclavicular adenopathy  Abd:  Soft, nontender, nondistended, no masses or organomegaly  Back:  No CVA tenderness, no tenderness to palpation along spine  Pelvic:  Normal appearing external female genitalia, no visible lesions, no rashes   Vagina is free of discharge, normal vaginal epithelium, no abnormal  lesions, no evidence of prolapse anteriorly or posteriorly  Normal appearing cervix, mobile and nontender  A thin prep pap smear was obtained  IUD strings are visible  Uterus is normal size, mobile and, nontender  No palpable adnexal masses or tenderness  No anoperineal lesions  Skin:  No concerning lesions  Extremeties: No edema      Assessment & Plan:   1  Routine annual exam      RTO one year orPRN  2  Encounter for cervical cancer screening, Pap smear performed today

## 2022-02-02 LAB
HPV HR 12 DNA CVX QL NAA+PROBE: NEGATIVE
HPV16 DNA CVX QL NAA+PROBE: NEGATIVE
HPV18 DNA CVX QL NAA+PROBE: NEGATIVE

## 2022-02-08 LAB
LAB AP GYN PRIMARY INTERPRETATION: NORMAL
Lab: NORMAL
PATH INTERP SPEC-IMP: NORMAL

## 2022-07-08 ENCOUNTER — OFFICE VISIT (OUTPATIENT)
Dept: GASTROENTEROLOGY | Facility: CLINIC | Age: 37
End: 2022-07-08
Payer: COMMERCIAL

## 2022-07-08 VITALS
BODY MASS INDEX: 39.75 KG/M2 | DIASTOLIC BLOOD PRESSURE: 86 MMHG | WEIGHT: 238.6 LBS | HEIGHT: 65 IN | SYSTOLIC BLOOD PRESSURE: 122 MMHG

## 2022-07-08 DIAGNOSIS — K21.9 GASTROESOPHAGEAL REFLUX DISEASE WITHOUT ESOPHAGITIS: Primary | ICD-10-CM

## 2022-07-08 DIAGNOSIS — R11.0 NAUSEA: ICD-10-CM

## 2022-07-08 DIAGNOSIS — K81.1 CHRONIC CHOLECYSTITIS: ICD-10-CM

## 2022-07-08 PROCEDURE — 99213 OFFICE O/P EST LOW 20 MIN: CPT | Performed by: INTERNAL MEDICINE

## 2022-07-08 NOTE — PROGRESS NOTES
[id number] - Outpatient Follow-up Note  Gabriela Lashay Moya 43 y o  female MRN: [id number]  Encounter: [id number]    ASSESSMENT AND PLAN:      1  Gastroesophageal reflux disease without esophagitis  36F here today for f/u  Doing well  No longer on any acid reflux meds, trying to eat healthier and lose weight     - GERD lifestyle modifications  - OK to do TUMS prn  - stress management    2  Chronic cholecystitis  S/p cholecystectomy 12/2021 - improved  Occ loose stools but no more sig discomfort  3  Nausea  improved      Followup Appointment: prn  ______________________________________________________________________    Chief Complaint   Patient presents with    Follow-up     Gallbladder surgery, GERD  HPI:  24-year-old female here today for follow-up  Overall doing much better after her gallbladder got taken out in December of 2021  She is completely off all reflux meds at this point  Occasional twinges in the right upper quadrant region with some loose stools pending what she eats, but weight is stable  No bleeding or significant discomfort or diarrhea  She has gained an additional 10 lb in the past 6 months      Historical Information   Past Medical History:   Diagnosis Date    Anemia 2007    Pernicious anemia    Asthma     Avulsion fracture of lateral malleolus of left fibula 8/4/2020    Biliary dyskinesia 12/1/2021    Depression     last assessed 11/25/13    Elevated blood pressure reading     w/o diagnosis of hypertension- Last assessed 09/15/15    Generalized anxiety disorder     last assessed 12/04/14    GERD (gastroesophageal reflux disease) April 2021    Herniated lumbar intervertebral disc     last assessed 03/10/14    Lumbar radiculopathy     last assessed 03/10/14    Lumbosacral spondylosis without myelopathy     last assessed 03/10/14    [facility] of [facility]    PONV (postoperative nausea and vomiting)     Postpartum anxiety 2019    Rectal bleeding 2020     Past Surgical History:   Procedure Laterality Date    BREAST LUMPECTOMY Right     resolved      SECTION      CHOLECYSTECTOMY      OR LAP,CHOLECYSTECTOMY N/A 2021    Procedure: CHOLECYSTECTOMY LAPAROSCOPIC;  Surgeon: Santana Willis MD;  [facility];  Service: General    OR REPAIR UMBILICAL UVUT,8+J/M,IIKSJ N/A 2021    Procedure: REPAIR HERNIA UMBILICAL;  Surgeon:  Santana Willis MD;  [facility];  Service: General    TOENAIL EXCISION      TOOTH EXTRACTION       Social History     Substance and Sexual Activity   Alcohol Use Not Currently    Alcohol/week: 0 0 standard drinks    Comment: Was drinking up to/ aprox 3 drinks a week prior to april     Social History     Substance and Sexual Activity   Drug Use Never     Social History     Tobacco Use   Smoking Status Never Smoker   Smokeless Tobacco Never Used     Family History   Problem Relation Age of Onset    Alcohol abuse Mother     Substance Abuse Mother     Mental illness Mother         Depression, anxiety, ptsd    Addiction problem Mother         Alcohol    Depression Mother         Depression    Alcohol abuse Father     Substance Abuse Father     Mental illness Father         Possible bipolar disorder, never disclosed    Addiction problem Father         Not biological father - drugs and alcohol    Early death Father         Suicide     Ovarian cancer Family     Irritable bowel syndrome Brother     Addiction problem Maternal Grandmother         Alcohol    Cancer Maternal Grandmother         I believe it was in her reproductive system    Ovarian cancer Maternal Grandmother         She also had a hysterectomy but it was not cancer related    Addiction problem Maternal Grandfather         Alcohol    Diabetes Maternal Grandfather     Irritable bowel syndrome Brother         Ibs-d    Colon cancer Neg Hx     Colon polyps Neg Hx          Current Outpatient Medications:     albuterol (Ventolin HFA) 90 mcg/act inhaler    Fluticasone Propionate (FLONASE ALLERGY RELIEF NA)    Multiple Vitamin (MULTIVITAMIN) tablet    Multiple Vitamins-Minerals (Hair Skin and Nails Formula) TABS  Allergies   Allergen Reactions    Morphine And Related Hives    Oxycodone-Acetaminophen Hyperactivity    Raspberry - Food Allergy Hives, Itching and Throat Swelling    Bentyl [Dicyclomine] Irritability    Molds & Smuts Hives, Itching and Rash     Reviewed medications and allergies and updated as indicated    PHYSICAL EXAM:    Blood pressure 122/86, height 5' 5" (1 651 m), weight 108 kg (238 lb 9 6 oz)  Body mass index is 39 71 kg/m²  General Appearance: NAD, cooperative, alert  Eyes: Anicteric, PERRLA, EOMI  ENT:  Normocephalic, atraumatic, normal mucosa  Neck:  Supple, symmetrical, trachea midline  Resp:  Clear to auscultation bilaterally; no rales, rhonchi or wheezing; respirations unlabored   CV:  S1 S2, Regular rate and rhythm; no murmur, rub, or gallop  GI:  Soft, non-tender, non-distended; normal bowel sounds; no masses, no organomegaly   Rectal: Deferred  Musculoskeletal: No cyanosis, clubbing or edema  Normal ROM    Skin:  No jaundice, rashes, or lesions   Heme/Lymph: No palpable cervical lymphadenopathy  Psych: Normal affect, good eye contact  Neuro: No gross deficits, AAOx3    Lab Results:   Lab Results   Component Value Date    WBC 7 81 11/09/2021    HGB 12 8 11/09/2021    HCT 40 8 11/09/2021    MCV 82 11/09/2021     11/09/2021     Lab Results   Component Value Date     (L) 11/29/2017    K 3 4 (L) 11/09/2021     11/09/2021    CO2 24 11/09/2021    BUN 8 11/09/2021    CREATININE 0 77 11/09/2021    CALCIUM 9 4 11/09/2021    AST 9 11/09/2021    ALT 31 11/09/2021    ALKPHOS 98 11/09/2021    PROT 6 9 11/29/2017    BILITOT 0 4 11/29/2017    EGFR 98 11/09/2021     No results found for: IRON, TIBC, FERRITIN  Lab Results Component Value Date    LIPASE 159 11/09/2021       Radiology Results:   No results found    Answers for HPI/ROS submitted by the patient on 7/7/2022  Chronicity: new  Onset: more than 1 year ago  Onset quality: sudden  Frequency: rarely  Episode duration: 12 months  Progression since onset: rapidly improving  Pain location: RUQ  Pain - numeric: 2/10  Pain quality: cramping, a sensation of fullness  Radiates to: RUQ  anorexia: Yes  arthralgias: No  belching: Yes  constipation: No  diarrhea: No  dysuria: No  fever: No  flatus: Yes  frequency: No  headaches: No  hematochezia: No  hematuria: No  melena: No  myalgias: No  nausea: Yes  weight loss: No  vomiting: No  Aggravated by: being still, eating  Relieved by: activity, passing flatus, vomiting

## 2022-07-29 ENCOUNTER — OFFICE VISIT (OUTPATIENT)
Dept: FAMILY MEDICINE CLINIC | Facility: HOSPITAL | Age: 37
End: 2022-07-29
Payer: COMMERCIAL

## 2022-07-29 VITALS
DIASTOLIC BLOOD PRESSURE: 82 MMHG | HEIGHT: 65 IN | HEART RATE: 86 BPM | OXYGEN SATURATION: 99 % | BODY MASS INDEX: 39.49 KG/M2 | WEIGHT: 237 LBS | SYSTOLIC BLOOD PRESSURE: 124 MMHG

## 2022-07-29 DIAGNOSIS — Z13.220 SCREENING FOR HYPERLIPIDEMIA: ICD-10-CM

## 2022-07-29 DIAGNOSIS — Z13.1 SCREENING FOR DIABETES MELLITUS: ICD-10-CM

## 2022-07-29 DIAGNOSIS — Z13.0 SCREENING FOR IRON DEFICIENCY ANEMIA: ICD-10-CM

## 2022-07-29 DIAGNOSIS — J01.40 ACUTE NON-RECURRENT PANSINUSITIS: ICD-10-CM

## 2022-07-29 DIAGNOSIS — Z11.4 SCREENING FOR HIV (HUMAN IMMUNODEFICIENCY VIRUS): Primary | ICD-10-CM

## 2022-07-29 DIAGNOSIS — Z11.59 NEED FOR HEPATITIS C SCREENING TEST: ICD-10-CM

## 2022-07-29 DIAGNOSIS — D84.9 IMMUNE DEFICIENCY DISORDER (HCC): ICD-10-CM

## 2022-07-29 DIAGNOSIS — Z13.29 SCREENING FOR THYROID DISORDER: ICD-10-CM

## 2022-07-29 PROCEDURE — 99213 OFFICE O/P EST LOW 20 MIN: CPT | Performed by: STUDENT IN AN ORGANIZED HEALTH CARE EDUCATION/TRAINING PROGRAM

## 2022-07-29 PROCEDURE — 3725F SCREEN DEPRESSION PERFORMED: CPT | Performed by: STUDENT IN AN ORGANIZED HEALTH CARE EDUCATION/TRAINING PROGRAM

## 2022-07-29 RX ORDER — BENZONATATE 200 MG/1
200 CAPSULE ORAL 3 TIMES DAILY PRN
Qty: 20 CAPSULE | Refills: 0 | Status: SHIPPED | OUTPATIENT
Start: 2022-07-29 | End: 2022-10-06

## 2022-07-29 RX ORDER — PROMETHAZINE HYDROCHLORIDE AND CODEINE PHOSPHATE 6.25; 1 MG/5ML; MG/5ML
5 SYRUP ORAL EVERY 4 HOURS PRN
Qty: 180 ML | Refills: 0 | Status: SHIPPED | OUTPATIENT
Start: 2022-07-29 | End: 2022-10-06

## 2022-07-29 RX ORDER — PSEUDOEPHEDRINE HCL 30 MG
60 TABLET ORAL EVERY 4 HOURS PRN
COMMUNITY
End: 2022-10-06

## 2022-07-29 RX ORDER — AMOXICILLIN AND CLAVULANATE POTASSIUM 875; 125 MG/1; MG/1
1 TABLET, FILM COATED ORAL EVERY 12 HOURS SCHEDULED
Qty: 20 TABLET | Refills: 0 | Status: SHIPPED | OUTPATIENT
Start: 2022-07-29 | End: 2022-08-08

## 2022-07-29 NOTE — PROGRESS NOTES
[facility],     Assessment/Plan:      Diagnosis ICD-10-CM Associated Orders   1  Screening for HIV (human immunodeficiency virus)  Z11 4 Human Immunodeficiency Virus 1/2 Antigen / Antibody ( Fourth Generation) with Reflex Testing     Human Immunodeficiency Virus 1/2 Antigen / Antibody ( Fourth Generation) with Reflex Testing   2  Need for hepatitis C screening test  Z11 59 Hepatitis C Qualitative by PCR     Hepatitis C Qualitative by PCR   3  Screening for iron deficiency anemia  Z13 0 CBC and differential     CBC and differential   4  Screening for thyroid disorder  Z13 29 TSH, 3rd generation with Free T4 reflex     TSH, 3rd generation with Free T4 reflex   5  Screening for diabetes mellitus  Z13 1 Comprehensive metabolic panel     Comprehensive metabolic panel   6  Screening for hyperlipidemia  Z13 220 Lipid Panel with Direct LDL reflex     Lipid Panel with Direct LDL reflex   7  Immune deficiency disorder ([address] )  D84 9    8  Acute non-recurrent pansinusitis  J01 40 amoxicillin-clavulanate (Augmentin) 875-125 mg per tablet     benzonatate (TESSALON) 200 MG capsule     promethazine-codeine (PHENERGAN WITH CODEINE) 6 25-10 mg/5 mL syrup      Screening & diagnostic bloodwork ordered, our office will reach out with results once obtained   Orders as above for cough, sinus  AP in Nov    Patient may call or return to office with any questions or concerns  ______________________________________________________________________  Subjective:     Patient ID: Ying Damon is a 43 y o  female  [facility]  Chief Complaint   Patient presents with    Ear Problem     [place]      [place] are [place], open & then full again  Started 10d ago mild  Upper dental pain,   Using mucinex, flonase, & sudafed now that it worsened  Tried nasal irrigation  Left upper lung, crackly  Hx of bronchitis  Coughing at bedtime   Sleeping upright due to PND     BMI Counseling: Body mass index is 39 44 kg/m²  The BMI is above normal  Nutrition recommendations include decreasing portion sizes and encouraging healthy choices of fruits and vegetables  Exercise recommendations include moderate physical activity 150 minutes/week and exercising 3-5 times per week  Rationale for BMI follow-up plan is due to patient being overweight or obese  Depression Screening and Follow-up Plan: Patient was screened for depression during today's encounter  They screened negative with a PHQ-2 score of 0  The following portions of the patient's history were reviewed and updated as appropriate: allergies, current medications, past medical history and problem list     Review of Systems   Constitutional: Positive for chills, diaphoresis and fever (99's)  Negative for appetite change and fatigue  HENT: Positive for congestion, postnasal drip, rhinorrhea, sinus pressure, sinus pain, sore throat and voice change (hoarse)  Respiratory: Positive for cough (barky)  Negative for shortness of breath  Cardiovascular: Negative for chest pain and palpitations  Gastrointestinal: Negative for diarrhea, nausea and vomiting  Musculoskeletal: Negative for arthralgias and myalgias  Neurological: Positive for dizziness (occasional)  Negative for light-headedness and headaches  Objective:      Vitals:    07/29/22 1138   BP: 124/82   Pulse: 86   SpO2: 99%      Physical Exam  Vitals reviewed  Constitutional:       General: She is not in acute distress  Appearance: Normal appearance  She is well-developed  She is obese  She is not ill-appearing  HENT:      Head: Normocephalic and atraumatic  Right Ear: External ear normal  There is no impacted cerumen  Left Ear: External ear normal  There is no impacted cerumen  Ears:      Comments: Mucoid right TM, left TM erythematous     Nose: Congestion present        Mouth/Throat:      Mouth: Mucous membranes are moist  Pharynx: Oropharynx is clear  No oropharyngeal exudate  Eyes:      General: No scleral icterus  Right eye: No discharge  Left eye: No discharge  Cardiovascular:      Rate and Rhythm: Normal rate and regular rhythm  Pulses: Normal pulses  Heart sounds: Normal heart sounds  No murmur heard  Pulmonary:      Effort: Pulmonary effort is normal  No respiratory distress  Breath sounds: Normal breath sounds  No stridor  No wheezing  Comments: Hoarse voice, barky cough  Musculoskeletal:      Cervical back: Normal range of motion  Skin:     General: Skin is warm  Neurological:      Mental Status: She is alert and oriented to person, place, and time  Gait: Gait normal    Psychiatric:         Mood and Affect: Mood normal          Behavior: Behavior normal          Thought Content: Thought content normal          Judgment: Judgment normal            Portions of the record may have been created with voice recognition software  Occasional wrong word or "sound alike" substitutions may have occurred due to the inherent limitations of voice recognition software  Please review the chart carefully and recognize, using context, where substitutions/typographical errors may have occurred

## 2022-10-06 ENCOUNTER — OFFICE VISIT (OUTPATIENT)
Dept: GYNECOLOGY | Facility: CLINIC | Age: 37
End: 2022-10-06
Payer: COMMERCIAL

## 2022-10-06 VITALS
DIASTOLIC BLOOD PRESSURE: 76 MMHG | WEIGHT: 247 LBS | SYSTOLIC BLOOD PRESSURE: 132 MMHG | BODY MASS INDEX: 41.15 KG/M2 | HEIGHT: 65 IN

## 2022-10-06 DIAGNOSIS — Z98.891 HISTORY OF C-SECTION: ICD-10-CM

## 2022-10-06 DIAGNOSIS — N92.1 BREAKTHROUGH BLEEDING WITH IUD: ICD-10-CM

## 2022-10-06 DIAGNOSIS — N76.0 BV (BACTERIAL VAGINOSIS): ICD-10-CM

## 2022-10-06 DIAGNOSIS — R10.2 PELVIC PAIN: ICD-10-CM

## 2022-10-06 DIAGNOSIS — N94.6 DYSMENORRHEA: ICD-10-CM

## 2022-10-06 DIAGNOSIS — N94.10 DYSPAREUNIA, FEMALE: ICD-10-CM

## 2022-10-06 DIAGNOSIS — Z97.5 BREAKTHROUGH BLEEDING WITH IUD: ICD-10-CM

## 2022-10-06 DIAGNOSIS — B96.89 BV (BACTERIAL VAGINOSIS): ICD-10-CM

## 2022-10-06 DIAGNOSIS — N92.6 IRREGULAR MENSTRUATION: ICD-10-CM

## 2022-10-06 DIAGNOSIS — Z90.49 S/P LAPAROSCOPIC CHOLECYSTECTOMY: ICD-10-CM

## 2022-10-06 DIAGNOSIS — N89.8 VAGINAL DISCHARGE: ICD-10-CM

## 2022-10-06 PROCEDURE — 99203 OFFICE O/P NEW LOW 30 MIN: CPT | Performed by: OBSTETRICS & GYNECOLOGY

## 2022-10-06 RX ORDER — CLINDAMYCIN HYDROCHLORIDE 300 MG/1
300 CAPSULE ORAL 2 TIMES DAILY
Qty: 14 CAPSULE | Refills: 0 | Status: SHIPPED | OUTPATIENT
Start: 2022-10-06 | End: 2022-10-13

## 2022-10-06 NOTE — PROGRESS NOTES
Assessment/Plan:    Diagnoses and all orders for this visit:    Pelvic pain  -     US pelvis complete non OB; Future  -     Chlamydia/GC amplified DNA by PCR    Dyspareunia, female  -     US pelvis complete non OB; Future    Dysmenorrhea    History of   -     US pelvis complete non OB; Future    S/P laparoscopic cholecystectomy    Vaginal discharge    BV (bacterial vaginosis)  -     clindamycin (CLEOCIN) 300 MG capsule; Take 1 capsule (300 mg total) by mouth 2 (two) times a day for 7 days    Breakthrough bleeding with IUD  -     US pelvis complete non OB; Future  -     hCG, quantitative; Future  -     hCG, quantitative    Irregular menstruation  -     TSH, 3rd generation with Free T4 reflex; Future  -     CBC; Future  -     hCG, quantitative; Future  -     TSH, 3rd generation with Free T4 reflex  -     CBC  -     hCG, quantitative  -     Chlamydia/GC amplified DNA by PCR         Social history works as a , she does not smoke cigarettes she is  has 1 child does not do illicit drugs, admits to social alcohol consumption  Subjective:  Pelvic pain, breakthrough bleeding, vaginal discharge     Patient ID: Carly Ngo is a 42 y o  female  HPI   17-year-old female  1 para 1 history of  section x1 has ParaGard IUD in place for past 3 years  Has recently began to have some irregular bleeding and spotting when it was unexpected  Previously she was getting 1 regular period every month which lasted about 5 days  She also has noted increase in pelvic pain and discomfort and post coital pelvic pain and cramps with dyspareunia  She has also noted some discharge recently  She has tried other contraceptive options she prefers the ParaGard because of there was no hormone in it  Screening laboratory studies ordered, pelvic ultrasound ordered culture wet mount to be completed  Review of Systems   Respiratory: Negative  Cardiovascular: Negative      Gastrointestinal: Negative  Genitourinary: Positive for dyspareunia, menstrual problem, pelvic pain and vaginal discharge  Neurological: Negative  Psychiatric/Behavioral: Negative  All other systems reviewed and are negative  Objective:  No acute distress  /76 (BP Location: Left arm, Patient Position: Sitting, Cuff Size: Standard)   Ht 5' 5" (1 651 m)   Wt 112 kg (247 lb)   BMI 41 10 kg/m²      Physical Exam  Vitals reviewed  Exam conducted with a chaperone present  Constitutional:       Appearance: Normal appearance  Eyes:      Extraocular Movements: Extraocular movements intact  Pupils: Pupils are equal, round, and reactive to light  Pulmonary:      Effort: Pulmonary effort is normal    Abdominal:      General: Abdomen is flat  Palpations: Abdomen is soft  Genitourinary:     General: Normal vulva  Comments: Normal external genitalia  Normal size uterus  Normal cervix ParaGard IUD strings clearly visible from the cervical os  No CMT  No pelvic masses  Yellow vaginal discharge, wet mount positive for clue cells consistent with BV  Culture for GC chlamydia obtained    Musculoskeletal:         General: Normal range of motion  Cervical back: Normal range of motion and neck supple  Skin:     General: Skin is warm and dry  Neurological:      Mental Status: She is alert and oriented to person, place, and time  Psychiatric:         Mood and Affect: Mood normal          Behavior: Behavior normal          Thought Content: Thought content normal          Judgment: Judgment normal         Please note    In addition to the time spent discussing the findings and results of today's visit and exam, I spent approximately 30 minutes of face-to-face time with the patient, greater than 50% of which was spent in counseling and coordination of care for this patient

## 2022-10-08 LAB
C TRACH RRNA SPEC QL NAA+PROBE: NOT DETECTED
N GONORRHOEA RRNA SPEC QL NAA+PROBE: NOT DETECTED

## 2022-10-11 ENCOUNTER — HOSPITAL ENCOUNTER (OUTPATIENT)
Dept: ULTRASOUND IMAGING | Facility: HOSPITAL | Age: 37
Discharge: HOME | End: 2022-10-11
Attending: OBSTETRICS & GYNECOLOGY
Payer: COMMERCIAL

## 2022-10-11 DIAGNOSIS — N92.1 BREAKTHROUGH BLEEDING WITH IUD: ICD-10-CM

## 2022-10-11 DIAGNOSIS — Z97.5 BREAKTHROUGH BLEEDING WITH IUD: ICD-10-CM

## 2022-10-11 DIAGNOSIS — Z98.891 HISTORY OF C-SECTION: ICD-10-CM

## 2022-10-11 DIAGNOSIS — N94.10 DYSPAREUNIA, FEMALE: ICD-10-CM

## 2022-10-11 DIAGNOSIS — R10.2 PELVIC PAIN: ICD-10-CM

## 2022-10-11 PROCEDURE — 76830 TRANSVAGINAL US NON-OB: CPT

## 2022-10-11 PROCEDURE — 76856 US EXAM PELVIC COMPLETE: CPT

## 2022-10-12 LAB
B-HCG SERPL-ACNC: <3 MIU/ML
ERYTHROCYTE [DISTWIDTH] IN BLOOD BY AUTOMATED COUNT: 13.3 % (ref 11–15)
HCT VFR BLD AUTO: 39 % (ref 35–45)
HGB BLD-MCNC: 12.8 G/DL (ref 11.7–15.5)
MCH RBC QN AUTO: 27 PG (ref 27–33)
MCHC RBC AUTO-ENTMCNC: 32.8 G/DL (ref 32–36)
MCV RBC AUTO: 82.3 FL (ref 80–100)
PLATELET # BLD AUTO: 308 THOUSAND/UL (ref 140–400)
PMV BLD REES-ECKER: 10 FL (ref 7.5–12.5)
RBC # BLD AUTO: 4.74 MILLION/UL (ref 3.8–5.1)
TSH SERPL-ACNC: 1.46 MIU/L
WBC # BLD AUTO: 7.9 THOUSAND/UL (ref 3.8–10.8)

## 2022-10-18 ENCOUNTER — TELEPHONE (OUTPATIENT)
Dept: GYNECOLOGY | Facility: CLINIC | Age: 37
End: 2022-10-18

## 2022-10-18 NOTE — TELEPHONE ENCOUNTER
Patient called  Saw her ultrasound result on MyChart and thinks it says that her IUD perforated her uterus  She has an appointment scheduled for 10/27/22 but is concerned about this result

## 2022-10-27 ENCOUNTER — OFFICE VISIT (OUTPATIENT)
Dept: GYNECOLOGY | Facility: CLINIC | Age: 37
End: 2022-10-27

## 2022-10-27 VITALS
WEIGHT: 248.8 LBS | BODY MASS INDEX: 41.45 KG/M2 | DIASTOLIC BLOOD PRESSURE: 76 MMHG | HEIGHT: 65 IN | SYSTOLIC BLOOD PRESSURE: 130 MMHG

## 2022-10-27 DIAGNOSIS — T83.32XA DISPLACEMENT OF INTRAUTERINE CONTRACEPTIVE DEVICE, INITIAL ENCOUNTER: Primary | ICD-10-CM

## 2022-10-27 DIAGNOSIS — Z97.5 IUD (INTRAUTERINE DEVICE) IN PLACE: ICD-10-CM

## 2022-10-27 LAB
ALBUMIN SERPL-MCNC: 4.2 G/DL (ref 3.6–5.1)
ALBUMIN/GLOB SERPL: 1.4 (CALC) (ref 1–2.5)
ALP SERPL-CCNC: 77 U/L (ref 31–125)
ALT SERPL-CCNC: 16 U/L (ref 6–29)
AST SERPL-CCNC: 16 U/L (ref 10–30)
BASOPHILS # BLD AUTO: 41 CELLS/UL (ref 0–200)
BASOPHILS NFR BLD AUTO: 0.5 %
BILIRUB SERPL-MCNC: 0.3 MG/DL (ref 0.2–1.2)
BUN SERPL-MCNC: 14 MG/DL (ref 7–25)
BUN/CREAT SERPL: NORMAL (CALC) (ref 6–22)
CALCIUM SERPL-MCNC: 9.8 MG/DL (ref 8.6–10.2)
CHLORIDE SERPL-SCNC: 104 MMOL/L (ref 98–110)
CHOLEST SERPL-MCNC: 205 MG/DL
CHOLEST/HDLC SERPL: 2.7 (CALC)
CO2 SERPL-SCNC: 27 MMOL/L (ref 20–32)
CREAT SERPL-MCNC: 0.73 MG/DL (ref 0.5–0.97)
EOSINOPHIL # BLD AUTO: 340 CELLS/UL (ref 15–500)
EOSINOPHIL NFR BLD AUTO: 4.2 %
ERYTHROCYTE [DISTWIDTH] IN BLOOD BY AUTOMATED COUNT: 13.1 % (ref 11–15)
GFR/BSA.PRED SERPLBLD CYS-BASED-ARV: 107 ML/MIN/1.73M2
GLOBULIN SER CALC-MCNC: 2.9 G/DL (CALC) (ref 1.9–3.7)
GLUCOSE SERPL-MCNC: 85 MG/DL (ref 65–99)
HCT VFR BLD AUTO: 39.3 % (ref 35–45)
HCV AB S/CO SERPL IA: 0.04
HCV AB SERPL QL IA: NORMAL
HDLC SERPL-MCNC: 75 MG/DL
HGB BLD-MCNC: 13.1 G/DL (ref 11.7–15.5)
HIV 1+2 AB+HIV1 P24 AG SERPL QL IA: NORMAL
LDLC SERPL CALC-MCNC: 105 MG/DL (CALC)
LYMPHOCYTES # BLD AUTO: 2082 CELLS/UL (ref 850–3900)
LYMPHOCYTES NFR BLD AUTO: 25.7 %
MCH RBC QN AUTO: 27 PG (ref 27–33)
MCHC RBC AUTO-ENTMCNC: 33.3 G/DL (ref 32–36)
MCV RBC AUTO: 81 FL (ref 80–100)
MONOCYTES # BLD AUTO: 567 CELLS/UL (ref 200–950)
MONOCYTES NFR BLD AUTO: 7 %
NEUTROPHILS # BLD AUTO: 5071 CELLS/UL (ref 1500–7800)
NEUTROPHILS NFR BLD AUTO: 62.6 %
NONHDLC SERPL-MCNC: 130 MG/DL (CALC)
PLATELET # BLD AUTO: 296 THOUSAND/UL (ref 140–400)
PMV BLD REES-ECKER: 10.6 FL (ref 7.5–12.5)
POTASSIUM SERPL-SCNC: 4.5 MMOL/L (ref 3.5–5.3)
PROT SERPL-MCNC: 7.1 G/DL (ref 6.1–8.1)
RBC # BLD AUTO: 4.85 MILLION/UL (ref 3.8–5.1)
SODIUM SERPL-SCNC: 137 MMOL/L (ref 135–146)
TRIGL SERPL-MCNC: 131 MG/DL
TSH SERPL-ACNC: 1.72 MIU/L
WBC # BLD AUTO: 8.1 THOUSAND/UL (ref 3.8–10.8)

## 2022-10-27 NOTE — PROGRESS NOTES
Iud removal    Date/Time: 10/27/2022 9:22 AM  Performed by: Henry Chang DO  Authorized by: Henry Chang DO   Universal Protocol:  Consent: Written consent obtained  Risks and benefits: risks, benefits and alternatives were discussed  Consent given by: patient  Patient understanding: patient states understanding of the procedure being performed  Patient consent: the patient's understanding of the procedure matches consent given  Procedure consent: procedure consent matches procedure scheduled  Patient identity confirmed: verbally with patient      Procedure:     Removed with no complications: yes      Removal due to mechanical complications of IUD: yes (1 arm imbedded in the uterine sidewall)    Comments:      Sterile prep with Betadine removed without difficulty  Declines new IUD at this time follow-up and annual exam next year

## 2022-12-28 ENCOUNTER — OFFICE VISIT (OUTPATIENT)
Dept: FAMILY MEDICINE CLINIC | Facility: HOSPITAL | Age: 37
End: 2022-12-28

## 2022-12-28 VITALS
BODY MASS INDEX: 41.32 KG/M2 | OXYGEN SATURATION: 98 % | HEIGHT: 65 IN | SYSTOLIC BLOOD PRESSURE: 138 MMHG | HEART RATE: 95 BPM | DIASTOLIC BLOOD PRESSURE: 94 MMHG | WEIGHT: 248 LBS

## 2022-12-28 DIAGNOSIS — F41.1 GENERALIZED ANXIETY DISORDER: ICD-10-CM

## 2022-12-28 DIAGNOSIS — K21.9 GASTROESOPHAGEAL REFLUX DISEASE WITHOUT ESOPHAGITIS: ICD-10-CM

## 2022-12-28 DIAGNOSIS — Z00.00 ROUTINE ADULT HEALTH MAINTENANCE: Primary | ICD-10-CM

## 2022-12-28 DIAGNOSIS — J45.990 EXERCISE-INDUCED ASTHMA: ICD-10-CM

## 2022-12-28 RX ORDER — OMEPRAZOLE 10 MG/1
10 CAPSULE, DELAYED RELEASE ORAL DAILY
COMMUNITY
Start: 2022-11-28

## 2022-12-28 NOTE — PROGRESS NOTES
[address] [facility] PRIMARY CARE [address]    NAME: Elke Moya  AGE: 42 y o  SEX: female  : 1985   DATE: 2022     Assessment and Plan:      Diagnosis ICD-10-CM Associated Orders   1  Routine adult health maintenance  Z00 00       2  Generalized anxiety disorder  F41 1       3  Gastroesophageal reflux disease without esophagitis  K21 9       4  Exercise-induced asthma  J45 990       5  BMI 40 0-44 9, adult [facility])  Z68 41       Labs reviewed  Immunizations and preventive care screenings were discussed with patient today  Appropriate education was printed on patient's after visit summary  Counseling:  Alcohol/drug use: discussed moderation in alcohol intake, the recommendations for healthy alcohol use, and avoidance of illicit drug use  Dental Health: discussed importance of regular tooth brushing, flossing, and dental visits  Injury prevention: discussed safety/seat belts, safety helmets, smoke detectors, carbon dioxide detectors, and smoking near bedding or upholstery  Sexual health: discussed sexually transmitted diseases, partner selection, use of condoms, avoidance of unintended pregnancy, and contraceptive alternatives  · Exercise: the importance of regular exercise/physical activity was discussed  Recommend exercise 3-5 times per week for at least 30 minutes  Return in about 1 year (around 2023) for [facility]  History of Present Illness:     Adult Annual Physical   Patient here for a comprehensive physical exam    [place]coming [place] trip  Diet and Physical Activity  · Diet/Nutrition: regular, works on 30 fibers & healthy bfast    · Exercise: moderate cardiovascular exercise, vigorous cardiovascular exercise, strength training exercises and 5-7 times a week on average  · No Drug or Tobacco use  Alcohol use - Rare/occasional/frequent: occasional     General Health  · Sleep: sleeps well  · Hearing: normal - bilateral   · Vision: goes for regular eye exams and wears glasses  · Dental: regular dental visits  /GYN Health  · Last menstrual period: Normal menses now, IUD out  · Contraceptive method: condoms  ·  one period     Review of Systems:     Review of Systems   Constitutional: Negative for chills and fever  Respiratory: Negative for cough and shortness of breath  Cardiovascular: Negative for chest pain and palpitations  Gastrointestinal: Negative for constipation and diarrhea  Genitourinary: Negative for dysuria and frequency  Neurological: Positive for headaches (occas sickness)  Negative for dizziness and light-headedness  Past Medical History:     Past Medical History:   Diagnosis Date   • Anemia     Pernicious anemia   • Asthma    • Avulsion fracture of lateral malleolus of left fibula 2020   • Biliary dyskinesia 2021   • Depression     last assessed 13   • Elevated blood pressure reading     w/o diagnosis of hypertension- Last assessed 09/15/15   • Generalized anxiety disorder     last assessed 14   • GERD (gastroesophageal reflux disease) 2021   • Herniated lumbar intervertebral disc     last assessed 03/10/14   • Lumbar radiculopathy     last assessed 03/10/14   • Lumbosacral spondylosis without myelopathy     last assessed 03/10/14   • Mild episode of recurrent major depressive disorder ([facility] [address] ) 2018   • Obesity    • PONV (postoperative nausea and vomiting)    • Postpartum anxiety 2019   • Rectal bleeding 2020      Past Surgical History:     Past Surgical History:   Procedure Laterality Date   • BREAST LUMPECTOMY Right     resolved    •  SECTION     • CHOLECYSTECTOMY     • NE LAPAROSCOPY SURG CHOLECYSTECTOMY N/A 2021    Procedure: CHOLECYSTECTOMY LAPAROSCOPIC;  Surgeon:  Anatoly Canseco MD;  [facility] MA[facility];  Service: General   • NE RPR UMBILICAL HRNA 5 YRS/> REDUCIBLE N/A 2021 Procedure: REPAIR HERNIA UMBILICAL;  Surgeon: Gaspar Goodell, MD;  Location: [facility];  Service: General   • TOENAIL EXCISION     • TOOTH EXTRACTION        Social History:     Social History     Socioeconomic History   • Marital status: /Civil Union     Spouse name: None   • Number of children: None   • Years of education: None   • Highest education level: None   Occupational History   • Occupation:    Tobacco Use   • Smoking status: Never   • Smokeless tobacco: Never   Vaping Use   • Vaping Use: Never used   Substance and Sexual Activity   • Alcohol use: Yes     Alcohol/week: 1 0 standard drink     Types: 1 Glasses of wine per week     Comment: Was drinking up to/ aprox 3 drinks a week prior to april   • Drug use: Never   • Sexual activity: Yes     Partners: Male     Birth control/protection: I U D  Other Topics Concern   • None   Social History Narrative    Lives with  their child  Feels safe at home    Sees dentist reg    Has living will      Social Determinants of Health     Financial Resource Strain: Not on file   Food Insecurity: Not on file   Transportation Needs: Not on file   Physical Activity: Not on file   Stress: Not on file   Social Connections: Not on file   Intimate Partner Violence: Not on file   Housing Stability: Not on file      Family History:     Family History   Problem Relation Age of Onset   • Alcohol abuse Mother         Not biological father   Passed away 5 years ago   • Substance Abuse Mother    • Mental illness Mother         Depression, anxiety, ptsd   • Addiction problem Mother         Alcohol   • Depression Mother         Depression   • Drug abuse Mother    • Alcohol abuse Father    • Substance Abuse Father    • Mental illness Father         Possible bipolar disorder, never disclosed   • Addiction problem Father         Not biological father - drugs and alcohol   • Early death Father         Suicide 2012   • Completed Suicide  Father    • Drug abuse Father    • Ovarian cancer Family    • Irritable bowel syndrome Brother    • Addiction problem Maternal Grandmother         Alcohol   • Cancer Maternal Grandmother         I believe it was in her reproductive system   • Ovarian cancer Maternal Grandmother         She also had a hysterectomy but it was not cancer related   • Addiction problem Maternal Grandfather         Alcohol   • Diabetes Maternal Grandfather    • Irritable bowel syndrome Brother         Ibs-d   • Colon cancer Neg Hx    • Colon polyps Neg Hx       Current Medications:     Current Outpatient Medications   Medication Sig Dispense Refill   • albuterol (Ventolin HFA) 90 mcg/act inhaler Inhale 1-2 puffs every 6 (six) hours as needed for wheezing 18 g 0   • Fluticasone Propionate (FLONASE ALLERGY RELIEF NA) 1 to 2 sprays daily prn     • Multiple Vitamin (MULTIVITAMIN) tablet Take 1 tablet by mouth daily     • Multiple Vitamins-Minerals (Hair Skin and Nails Formula) TABS      • omeprazole (PriLOSEC) 10 mg delayed release capsule Take 10 mg by mouth in the morning       No current facility-administered medications for this visit  Allergies: Allergies   Allergen Reactions   • Morphine And Related Hives   • Oxycodone-Acetaminophen Hyperactivity   • Raspberry - Food Allergy Hives, Itching and Throat Swelling   • Bentyl [Dicyclomine] Irritability   • Molds & Smuts Hives, Itching and Rash      Physical Exam:     /94   Pulse 95   Ht 5' 5" (1 651 m)   Wt 112 kg (248 lb)   SpO2 98%   BMI 41 27 kg/m²     Physical Exam  Vitals and nursing note reviewed  Constitutional:       General: She is not in acute distress  Appearance: Normal appearance  She is well-developed  She is obese  She is not ill-appearing  HENT:      Head: Normocephalic and atraumatic  Eyes:      Conjunctiva/sclera: Conjunctivae normal    Cardiovascular:      Rate and Rhythm: Normal rate and regular rhythm  Pulses: Normal pulses        Heart sounds: Normal heart sounds  No murmur heard  Pulmonary:      Effort: Pulmonary effort is normal  No respiratory distress  Breath sounds: Normal breath sounds  No wheezing  Abdominal:      Palpations: Abdomen is soft  Tenderness: There is no abdominal tenderness  Musculoskeletal:         General: No swelling  Cervical back: Neck supple  Skin:     General: Skin is warm and dry  Capillary Refill: Capillary refill takes less than 2 seconds  Neurological:      Mental Status: She is alert and oriented to person, place, and time  Gait: Gait normal    Psychiatric:         Mood and Affect: Mood normal          Behavior: Behavior normal          Thought Content:  Thought content normal          Judgment: Judgment normal           DO Butch Bolivar [address]

## 2023-01-14 ENCOUNTER — OFFICE VISIT (OUTPATIENT)
Dept: URGENT CARE | Facility: CLINIC | Age: 38
End: 2023-01-14

## 2023-01-14 ENCOUNTER — NURSE TRIAGE (OUTPATIENT)
Dept: OTHER | Facility: OTHER | Age: 38
End: 2023-01-14

## 2023-01-14 VITALS
SYSTOLIC BLOOD PRESSURE: 146 MMHG | OXYGEN SATURATION: 99 % | TEMPERATURE: 99 F | DIASTOLIC BLOOD PRESSURE: 95 MMHG | HEART RATE: 92 BPM | RESPIRATION RATE: 18 BRPM

## 2023-01-14 DIAGNOSIS — J02.9 SORE THROAT: ICD-10-CM

## 2023-01-14 DIAGNOSIS — J02.0 STREP PHARYNGITIS: Primary | ICD-10-CM

## 2023-01-14 LAB — S PYO AG THROAT QL: POSITIVE

## 2023-01-14 RX ORDER — AMOXICILLIN 500 MG/1
500 CAPSULE ORAL EVERY 12 HOURS SCHEDULED
Qty: 20 CAPSULE | Refills: 0 | Status: SHIPPED | OUTPATIENT
Start: 2023-01-14 | End: 2023-01-24

## 2023-01-14 NOTE — TELEPHONE ENCOUNTER
Regarding: sore itchy throat with white spots on it  ----- Message from Luis Fernando Stoddard sent at 1/14/2023  4:43 PM EST -----  " I have a sore itchy throat with white spots on it  I would like to get some antibiotics  "

## 2023-01-14 NOTE — TELEPHONE ENCOUNTER
Reason for Disposition  • SEVERE (e g , excruciating) throat pain    Answer Assessment - Initial Assessment Questions  1  ONSET: "When did the throat start hurting?" (Hours or days ago)        I think I have strep throat and would like antibiotic called in     2  SEVERITY: "How bad is the sore throat?" (Scale 1-10; mild, moderate or severe)    - MILD (1-3):  doesn't interfere with eating or normal activities    - MODERATE (4-7): interferes with eating some solids and normal activities    - SEVERE (8-10):  excruciating pain, interferes with most normal activities    - SEVERE DYSPHAGIA: can't swallow liquids, drooling       Severe     3  STREP EXPOSURE: "Has there been any exposure to strep within the past week?" If Yes, ask: "What type of contact occurred?"        Unsure     4  VIRAL SYMPTOMS: "Are there any symptoms of a cold, such as a runny nose, cough, hoarse voice or red eyes?"       Unsure    5  FEVER: "Do you have a fever?" If Yes, ask: "What is your temperature, how was it measured, and when did it start?"     Denies    6  PUS ON THE TONSILS: "Is there pus on the tonsils in the back of your throat?"      yes  7   OTHER SYMPTOMS: "Do you have any other symptoms?" (e g , difficulty breathing, headache, rash)      Denies    Protocols used: SORE THROAT-ADULT-

## 2023-01-14 NOTE — TELEPHONE ENCOUNTER
Patient called in with sore throat and concerned she has strep  Patient  requested antibiotic called in  Explained she needs evaluation for strep and recommended urgent care evaluation

## 2023-01-14 NOTE — PROGRESS NOTES
[facility]        NAME: Jasvir Guadalupe is a 42 y o  female  : 1985    MRN: [id number]  DATE: 2023  TIME: 6:40 PM    Assessment and Plan   Strep pharyngitis [J02 0]  1  Strep pharyngitis  amoxicillin (AMOXIL) 500 mg capsule      2  Sore throat  POCT rapid strepA    CANCELED: Throat culture            Patient Instructions       Follow up with PCP in 3-5 days  Proceed to  ER if symptoms worsen  Chief Complaint     Chief Complaint   Patient presents with   • Sore Throat     Pt woke up with a sore throat and headache today  History of Present Illness       25-year-old female reports beginning with onset of sore throat this morning  Pain has progressed throughout the day and she has now noticed white spots on the back of her throat  Denies any fevers or chills      Review of Systems   Review of Systems   Constitutional: Negative  HENT: Positive for sore throat  Eyes: Negative  Respiratory: Negative  Cardiovascular: Negative  Gastrointestinal: Negative  Endocrine: Negative  Genitourinary: Negative  Musculoskeletal: Negative  Skin: Negative  Allergic/Immunologic: Negative  Neurological: Negative  Hematological: Negative  Psychiatric/Behavioral: Negative            Current Medications       Current Outpatient Medications:   •  amoxicillin (AMOXIL) 500 mg capsule, Take 1 capsule (500 mg total) by mouth every 12 (twelve) hours for 10 days, Disp: 20 capsule, Rfl: 0  •  albuterol (Ventolin HFA) 90 mcg/act inhaler, Inhale 1-2 puffs every 6 (six) hours as needed for wheezing, Disp: 18 g, Rfl: 0  •  Fluticasone Propionate (FLONASE ALLERGY RELIEF NA), 1 to 2 sprays daily prn, Disp: , Rfl:   •  Multiple Vitamin (MULTIVITAMIN) tablet, Take 1 tablet by mouth daily, Disp: , Rfl:   •  Multiple Vitamins-Minerals (Hair Skin and Nails Formula) TABS, , Disp: , Rfl:   •  omeprazole (PriLOSEC) 10 mg delayed release capsule, Take 10 mg by mouth in the morning, Disp: , Rfl:     Current Allergies     Allergies as of 2023 - Reviewed 2023   Allergen Reaction Noted   • Morphine and related Hives 2013   • Oxycodone-acetaminophen Hyperactivity 2013   • Raspberry - food allergy Hives, Itching, and Throat Swelling 1999   • Bentyl [dicyclomine] Irritability 2022   • Molds & smuts Hives, Itching, and Rash 2021            The following portions of the patient's history were reviewed and updated as appropriate: allergies, current medications, past family history, past medical history, past social history, past surgical history and problem list      Past Medical History:   Diagnosis Date   • Anemia     Pernicious anemia   • Asthma    • Avulsion fracture of lateral malleolus of left fibula 2020   • Biliary dyskinesia 2021   • Depression     last assessed 13   • Elevated blood pressure reading     w/o diagnosis of hypertension- Last assessed 09/15/15   • Generalized anxiety disorder     last assessed 14   • GERD (gastroesophageal reflux disease) 2021   • Herniated lumbar intervertebral disc     last assessed 03/10/14   • Lumbar radiculopathy     last assessed 03/10/14   • Lumbosacral spondylosis without myelopathy     last assessed 03/10/14   • [facility] 2018   • Obesity    • PONV (postoperative nausea and vomiting)    • Postpartum anxiety 2019   • Rectal bleeding 2020       Past Surgical History:   Procedure Laterality Date   • BREAST LUMPECTOMY Right     resolved    •  SECTION     • CHOLECYSTECTOMY     • DE LAPAROSCOPY SURG CHOLECYSTECTOMY N/A 2021    Procedure: CHOLECYSTECTOMY LAPAROSCOPIC;  Surgeon: Jeannine Fierro MD;  Location: [facility];  Service: General   • DE RPR UMBILICAL HRNA 5 YRS/> REDUCIBLE N/A 2021    Procedure: REPAIR HERNIA UMBILICAL;  Surgeon:  Jeannine Fierro MD;  Location: [facility];  Service: General • TOENAIL EXCISION     • TOOTH EXTRACTION         Family History   Problem Relation Age of Onset   • Alcohol abuse Mother         Not biological father  Passed away 5 years ago   • Substance Abuse Mother    • Mental illness Mother         Depression, anxiety, ptsd   • Addiction problem Mother         Alcohol   • Depression Mother         Depression   • Drug abuse Mother    • Alcohol abuse Father    • Substance Abuse Father    • Mental illness Father         Possible bipolar disorder, never disclosed   • Addiction problem Father         Not biological father - drugs and alcohol   • Early death Father         Suicide 2012   • Completed Suicide  Father    • Drug abuse Father    • Ovarian cancer Family    • Irritable bowel syndrome Brother    • Addiction problem Maternal Grandmother         Alcohol   • Cancer Maternal Grandmother         I believe it was in her reproductive system   • Ovarian cancer Maternal Grandmother         She also had a hysterectomy but it was not cancer related   • Addiction problem Maternal Grandfather         Alcohol   • Diabetes Maternal Grandfather    • Irritable bowel syndrome Brother         Ibs-d   • Colon cancer Neg Hx    • Colon polyps Neg Hx          Medications have been verified  Objective   /95   Pulse 92   Temp 99 °F (37 2 °C)   Resp 18   SpO2 99%   No LMP recorded  Physical Exam     Physical Exam  Vitals and nursing note reviewed  Constitutional:       Appearance: She is well-developed  HENT:      Head: Normocephalic  Mouth/Throat:      Pharynx: Oropharyngeal exudate and posterior oropharyngeal erythema present  Eyes:      Pupils: Pupils are equal, round, and reactive to light  Cardiovascular:      Rate and Rhythm: Normal rate  Pulmonary:      Effort: Pulmonary effort is normal    Musculoskeletal:         General: Normal range of motion  Skin:     General: Skin is warm and dry     Neurological:      Mental Status: She is alert and oriented to person, place, and time

## 2023-01-18 ENCOUNTER — TELEPHONE (OUTPATIENT)
Dept: FAMILY MEDICINE CLINIC | Facility: HOSPITAL | Age: 38
End: 2023-01-18

## 2023-01-19 ENCOUNTER — TELEPHONE (OUTPATIENT)
Dept: FAMILY MEDICINE CLINIC | Facility: HOSPITAL | Age: 38
End: 2023-01-19

## 2023-01-25 ENCOUNTER — OFFICE VISIT (OUTPATIENT)
Dept: FAMILY MEDICINE CLINIC | Facility: HOSPITAL | Age: 38
End: 2023-01-25

## 2023-01-25 VITALS
OXYGEN SATURATION: 99 % | BODY MASS INDEX: 39.82 KG/M2 | DIASTOLIC BLOOD PRESSURE: 82 MMHG | SYSTOLIC BLOOD PRESSURE: 118 MMHG | WEIGHT: 239 LBS | HEIGHT: 65 IN | HEART RATE: 74 BPM

## 2023-01-25 DIAGNOSIS — J02.0 STREP PHARYNGITIS: Primary | ICD-10-CM

## 2023-01-25 DIAGNOSIS — D84.9 IMMUNE DEFICIENCY DISORDER (HCC): ICD-10-CM

## 2023-01-25 RX ORDER — AMOXICILLIN AND CLAVULANATE POTASSIUM 875; 125 MG/1; MG/1
1 TABLET, FILM COATED ORAL EVERY 12 HOURS SCHEDULED
Qty: 8 TABLET | Refills: 0 | Status: SHIPPED | OUTPATIENT
Start: 2023-01-25 | End: 2023-01-29

## 2023-01-25 NOTE — PROGRESS NOTES
[facility],     Assessment/Plan:      Diagnosis ICD-10-CM Associated Orders   1  Strep pharyngitis  J02 0 amoxicillin-clavulanate (AUGMENTIN) 875-125 mg per tablet      2  Immune deficiency disorder (HCC)  D84 9 amoxicillin-clavulanate (AUGMENTIN) 875-125 mg per tablet        • Finish Augmentin for 4 additional days  F/U in prn  • Patient may call or return to office with any questions or concerns  _____________________________________________________________________  Subjective:     Patient ID: Holli Nix is a 42 y o  female  [facility]  Elke Moya  Chief Complaint   Patient presents with   • Follow-up     Strep throat   Seen in UC   Left side throat pain      Just finished Amoxil po x10d yesterday  Strep + rapid in UC  Is way better, but not     Left glands & left ear feel swollen  Sick for a while even on the meds  Still hurts to eat  Pink eye this time, but did go away  Hx of strep throats as a teen, 12 yo  The following portions of the patient's history were reviewed and updated as appropriate: allergies, current medications, past medical history, and problem list     Review of Systems   Constitutional: Negative for chills and fever  HENT: Positive for ear pain, sore throat and trouble swallowing  Negative for congestion, postnasal drip, rhinorrhea, sinus pressure, sinus pain and voice change  Eyes: Positive for itching (rarely)  Negative for pain and redness  Respiratory: Negative for cough and shortness of breath  Cardiovascular: Negative for chest pain and palpitations  Neurological: Negative for dizziness, light-headedness and headaches  Objective:      Vitals:    01/25/23 1414   BP: 118/82   Pulse: 74   SpO2: 99%      Physical Exam  Vitals reviewed  Constitutional:       General: She is not in acute distress  Appearance: Normal appearance  She is well-developed  She is obese  She is not ill-appearing     HENT:      Head: Normocephalic and atraumatic  Right Ear: Tympanic membrane, ear canal and external ear normal  There is no impacted cerumen  Left Ear: Tympanic membrane, ear canal and external ear normal  There is no impacted cerumen  Nose: Nose normal  No congestion  Mouth/Throat:      Mouth: Mucous membranes are moist       Pharynx: Oropharynx is clear  No oropharyngeal exudate  Eyes:      General: No scleral icterus  Right eye: No discharge  Left eye: No discharge  Cardiovascular:      Rate and Rhythm: Normal rate and regular rhythm  Pulses: Normal pulses  Heart sounds: Normal heart sounds  No murmur heard  Pulmonary:      Effort: Pulmonary effort is normal  No respiratory distress  Breath sounds: Normal breath sounds  No stridor  No wheezing  Musculoskeletal:      Cervical back: Normal range of motion and neck supple  Tenderness present  No rigidity  Lymphadenopathy:      Cervical: Cervical adenopathy (b/l ant chain) present  Skin:     General: Skin is warm  Neurological:      Mental Status: She is alert and oriented to person, place, and time  Psychiatric:         Mood and Affect: Mood normal          Behavior: Behavior normal          Thought Content: Thought content normal          Judgment: Judgment normal            Portions of the record may have been created with voice recognition software  Occasional wrong word or "sound alike" substitutions may have occurred due to the inherent limitations of voice recognition software  Please review the chart carefully and recognize, using context, where substitutions/typographical errors may have occurred

## 2023-02-02 ENCOUNTER — ANNUAL EXAM (OUTPATIENT)
Dept: GYNECOLOGY | Facility: CLINIC | Age: 38
End: 2023-02-02

## 2023-02-02 VITALS
DIASTOLIC BLOOD PRESSURE: 84 MMHG | WEIGHT: 244.2 LBS | SYSTOLIC BLOOD PRESSURE: 126 MMHG | HEIGHT: 65 IN | BODY MASS INDEX: 40.69 KG/M2

## 2023-02-02 DIAGNOSIS — E66.9 OBESITY (BMI 35.0-39.9 WITHOUT COMORBIDITY): ICD-10-CM

## 2023-02-02 DIAGNOSIS — Z01.419 ENCOUNTER FOR ANNUAL ROUTINE GYNECOLOGICAL EXAMINATION: ICD-10-CM

## 2023-02-02 DIAGNOSIS — Z90.49 S/P LAPAROSCOPIC CHOLECYSTECTOMY: ICD-10-CM

## 2023-02-02 DIAGNOSIS — Z12.31 ENCOUNTER FOR MAMMOGRAM TO ESTABLISH BASELINE MAMMOGRAM: ICD-10-CM

## 2023-02-02 NOTE — PROGRESS NOTES
Assessment   Annual well woman exam  History of  section x1  No new GYN problems or complaints  Reviewed self breast exam techniques  Obesity BMI 40  Discussed diet and exercise        Plan   Considering tubal ligation  Screening mammogram ordered  Normal Pap in , next Pap due    All questions answered  Subjective here for annual exam     Humberto Cruz is a 42 y o  female who presents for annual exam  Periods are regular every 28-30 days, lasting 5 days  Dysmenorrhea:none  Cyclic symptoms include none  No intermenstrual bleeding, spotting, or discharge  The patient reports that there is not domestic violence in her life  Current contraception: condoms  History of abnormal Pap smear: no  Family history of uterine or ovarian cancer: no  Regular self breast exam: yes  History of abnormal mammogram: no  Family history of breast cancer: no  History of abnormal lipids: no  Menstrual History:  OB History    No obstetric history on file  Menarche age: 13  Patient's last menstrual period was 2023 (exact date)  Review of Systems  Pertinent items are noted in HPI        Objective no acute distress   /84 (BP Location: Left arm, Patient Position: Sitting, Cuff Size: Large)   Ht 5' 5" (1 651 m)   Wt 111 kg (244 lb 3 2 oz)   LMP 2023 (Exact Date)   BMI 40 64 kg/m²     General:   alert and oriented, in no acute distress, alert, appears stated age and cooperative   Heart: regular rate and rhythm, S1, S2 normal, no murmur, click, rub or gallop   Lungs: clear to auscultation bilaterally   Abdomen: soft, non-tender, without masses or organomegaly   Vulva: normal, Bartholin's, Urethra, [place]'s normal, female escutcheon   Vagina: normal mucosa, normal discharge, no palpable nodules   Cervix: anteverted, no cervical motion tenderness, no lesions and nulliparous appearance   Uterus: normal size, anteverted, mobile, non-tender, normal shape and consistency   Adnexa: normal adnexa Bilateral breast exam in the sitting and supine position with chaperone present, no visible or palpable breast lesions identified  No breast masses noted  No supraclavicular or axillary lymphadenopathy noted  No nipple discharge  Reviewed self-breast exam techniques     Rectal exam,  deferred

## 2023-02-14 ENCOUNTER — OFFICE VISIT (OUTPATIENT)
Dept: FAMILY MEDICINE CLINIC | Facility: HOSPITAL | Age: 38
End: 2023-02-14

## 2023-02-14 VITALS
DIASTOLIC BLOOD PRESSURE: 86 MMHG | WEIGHT: 246 LBS | HEIGHT: 65 IN | HEART RATE: 89 BPM | BODY MASS INDEX: 40.98 KG/M2 | SYSTOLIC BLOOD PRESSURE: 129 MMHG | OXYGEN SATURATION: 98 %

## 2023-02-14 DIAGNOSIS — J06.9 URI WITH COUGH AND CONGESTION: Primary | ICD-10-CM

## 2023-02-14 DIAGNOSIS — J45.990 EXERCISE-INDUCED ASTHMA: ICD-10-CM

## 2023-02-14 RX ORDER — CEFUROXIME AXETIL 500 MG/1
500 TABLET ORAL EVERY 12 HOURS SCHEDULED
Qty: 14 TABLET | Refills: 0 | Status: SHIPPED | OUTPATIENT
Start: 2023-02-14 | End: 2023-02-21

## 2023-02-14 RX ORDER — ALBUTEROL SULFATE 90 UG/1
1-2 AEROSOL, METERED RESPIRATORY (INHALATION) EVERY 6 HOURS PRN
Qty: 18 G | Refills: 2 | Status: SHIPPED | OUTPATIENT
Start: 2023-02-14

## 2023-02-14 RX ORDER — GUAIFENESIN 600 MG/1
600 TABLET, EXTENDED RELEASE ORAL DAILY
COMMUNITY
Start: 2023-02-12

## 2023-02-14 RX ORDER — PSEUDOEPHEDRINE HCL 30 MG
30 TABLET ORAL DAILY
COMMUNITY
Start: 2023-02-12

## 2023-02-14 NOTE — PROGRESS NOTES
[facility],     Assessment/Plan:      Diagnosis ICD-10-CM Associated Orders   1  URI with cough and congestion  J06 9 albuterol (Ventolin HFA) 90 mcg/act inhaler     cefuroxime (CEFTIN) 500 mg tablet      2  Exercise-induced asthma  J45 990 albuterol (Ventolin HFA) 90 mcg/act inhaler        • Ceftin & renewed albuterol script  Declined perles  Using mucinex 1200 mg bid  • Call if not getting better, could trial medrol angus  Return if symptoms worsen or fail to improve  • Patient may call or return to office with any questions or concerns  ______________________________________________________________________  Subjective:     Patient ID: Cristobal Gross is a 42 y o  female  [facility]  Chief Complaint   Patient presents with   • Cough   • Wheezing     2-3 weeks of bark cough  Mostly without mucus, now it is mucousy  Some pearly white or green colored  Not yellow  No blood  Wheezing at night  MCGOWAN +  Muscles tired, fatigued starting mostly Sunday  Mucinex & Sudafed helped Friday, but now worse again  No one else sick  The following portions of the patient's history were reviewed and updated as appropriate: allergies, current medications, past medical history, and problem list     Review of Systems   Constitutional: Positive for fatigue  Negative for appetite change, chills, diaphoresis and fever  HENT: Positive for congestion, postnasal drip, rhinorrhea, sinus pressure, sinus pain and voice change (hoarse)  Negative for ear pain, sore throat and trouble swallowing  Eyes: Negative for pain and redness  Respiratory: Positive for cough and chest tightness (upper)  Negative for shortness of breath  MCGOWAN +   Cardiovascular: Negative for chest pain and palpitations  Gastrointestinal: Negative for diarrhea, nausea and vomiting  Musculoskeletal: Negative for myalgias     Neurological: Negative for dizziness, weakness, light-headedness and headaches  Objective:      Vitals:    02/14/23 1452   BP: 129/86   Pulse: 89   SpO2: 98%      Physical Exam  Vitals reviewed  Constitutional:       General: She is not in acute distress  Appearance: Normal appearance  She is well-developed  She is obese  She is not ill-appearing  HENT:      Head: Normocephalic and atraumatic  Right Ear: Tympanic membrane, ear canal and external ear normal  There is no impacted cerumen  Left Ear: Tympanic membrane, ear canal and external ear normal       Nose: Nose normal  No congestion  Mouth/Throat:      Mouth: Mucous membranes are moist       Pharynx: Oropharynx is clear  No oropharyngeal exudate  Eyes:      General: No scleral icterus  Right eye: No discharge  Left eye: No discharge  Cardiovascular:      Rate and Rhythm: Normal rate and regular rhythm  Pulses: Normal pulses  Heart sounds: Normal heart sounds  No murmur heard  Pulmonary:      Effort: Pulmonary effort is normal  No respiratory distress  Breath sounds: Normal breath sounds  No stridor  No wheezing  Comments: Barky cough, hoarse voice  Musculoskeletal:      Cervical back: Normal range of motion  No rigidity  Right lower leg: No edema  Left lower leg: No edema  Skin:     General: Skin is warm  Neurological:      Mental Status: She is alert and oriented to person, place, and time  Gait: Gait normal    Psychiatric:         Mood and Affect: Mood normal          Behavior: Behavior normal          Thought Content: Thought content normal          Judgment: Judgment normal            Portions of the record may have been created with voice recognition software  Occasional wrong word or "sound alike" substitutions may have occurred due to the inherent limitations of voice recognition software  Please review the chart carefully and recognize, using context, where substitutions/typographical errors may have occurred

## 2023-02-17 ENCOUNTER — TELEPHONE (OUTPATIENT)
Dept: FAMILY MEDICINE CLINIC | Facility: HOSPITAL | Age: 38
End: 2023-02-17

## 2023-02-17 DIAGNOSIS — J06.9 UPPER RESPIRATORY TRACT INFECTION, UNSPECIFIED TYPE: Primary | ICD-10-CM

## 2023-02-17 RX ORDER — METHYLPREDNISOLONE 4 MG/1
TABLET ORAL
Qty: 21 EACH | Refills: 0 | Status: SHIPPED | OUTPATIENT
Start: 2023-02-17 | End: 2023-02-24 | Stop reason: ALTCHOICE

## 2023-02-17 NOTE — TELEPHONE ENCOUNTER
Pt left VM stating she was seen for cough on Tuesday, was told told to call if not feeling any better  Feels slightly better but more productive cough and wheezing  Asking if a steroid would be an option   PCB

## 2023-02-22 ENCOUNTER — TELEPHONE (OUTPATIENT)
Dept: FAMILY MEDICINE CLINIC | Facility: HOSPITAL | Age: 38
End: 2023-02-22

## 2023-02-22 NOTE — TELEPHONE ENCOUNTER
Patient has had cough for 3 wks  Yesterday was last day of abx and today is last day of steroid  Still has productive cough and crackle in lungs  Asking if she should be seen again or wait it out for another day or two

## 2023-02-24 ENCOUNTER — HOSPITAL ENCOUNTER (OUTPATIENT)
Dept: RADIOLOGY | Facility: HOSPITAL | Age: 38
End: 2023-02-24
Attending: STUDENT IN AN ORGANIZED HEALTH CARE EDUCATION/TRAINING PROGRAM

## 2023-02-24 ENCOUNTER — OFFICE VISIT (OUTPATIENT)
Dept: FAMILY MEDICINE CLINIC | Facility: HOSPITAL | Age: 38
End: 2023-02-24

## 2023-02-24 VITALS
WEIGHT: 245 LBS | OXYGEN SATURATION: 99 % | SYSTOLIC BLOOD PRESSURE: 118 MMHG | TEMPERATURE: 98.8 F | BODY MASS INDEX: 40.82 KG/M2 | HEIGHT: 65 IN | HEART RATE: 94 BPM | DIASTOLIC BLOOD PRESSURE: 88 MMHG

## 2023-02-24 DIAGNOSIS — J45.990 EXERCISE-INDUCED ASTHMA: ICD-10-CM

## 2023-02-24 DIAGNOSIS — J06.9 URI WITH COUGH AND CONGESTION: Primary | ICD-10-CM

## 2023-02-24 DIAGNOSIS — J06.9 URI WITH COUGH AND CONGESTION: ICD-10-CM

## 2023-02-24 RX ORDER — FLUTICASONE PROPIONATE 44 UG/1
2 AEROSOL, METERED RESPIRATORY (INHALATION) 2 TIMES DAILY
Qty: 10.6 G | Refills: 1 | Status: SHIPPED | OUTPATIENT
Start: 2023-02-24

## 2023-02-24 NOTE — PROGRESS NOTES
1/25/2022    Jesse Tejada MD  [address] [facility] [address]  [facility] [address]    RE: Madonna Duque       Dear Colleague,     I had the pleasure of seeing Madonna Duque in the [facility].  CARDIOLOGY CLINIC VISIT  DATE OF SERVICE:  January 25, 2022      PRIMARY CARE PHYSICIAN:  Jesse Tejada    HISTORY OF PRESENT ILLNESS:  Ms. Madonna Duque  is 68 years old and is followed for history of coronary disease, prior coronary intervention and prior myocardial infarct who presents to clinic today for follow up.  She was last seen by Dr. Bennett in June 2020 and this is my first visit with Madonna.       In 2015, she was in [place].  She experienced an acute anterior ST elevation myocardial infarct complicated by ventricular fibrillatory arrest.  She was evacuated by air and eventually underwent coronary angiography which resulted in placement of 2 stents in the LAD.  She has done well since that time without recurrent symptoms.      She has dyslipidemia but developed severe myalgias with 40 mg of rosuvastatin.  She then was in a cholesterol therapy study at [facility] run by Dr. Surjit Perez that was discontinued, then was on therapy with Praluent.  She developed leg aching and weakness with Praluent and it necessitated the discontinuation of the medication though her LDL had fallen to 81 mg/dl. She has been significantly intolerant of statins due to severe myalgias with Zetia, rosuvastatin, atorvastatin and simvastatin.  With her last visit, rosuvastatin 5 mg daily was started with concurrent use of coenzyme Q 10 and her LDL has returned at 91 mg/dL (HDL 58 mg/dL liter) as measured last week.     She continues on aspirin and beta blockade.  She completed a stress echocardiogram in 2016 with an excellent exercise time and no evidence of ischemia.     Her carvedilol was decreased due to fatigue and light-headedness.    In follow up today, Madonna reports feeling very well. She denies any exertional  [facility],     Assessment/Plan:      Diagnosis ICD-10-CM Associated Orders   1  URI with cough and congestion  J06 9 XR chest pa & lateral     fluticasone (Flovent HFA) 44 mcg/act inhaler      2  Exercise-induced asthma  J45 990 fluticasone (Flovent HFA) 44 mcg/act inhaler        • Trial flovent inhaler, if not covered, can order nebs  Try to decrease the albuterol  • Warned of thrush for inhaler  • Nasal saline flushes, tons of water, vaseline  F/U in Dec for annual  • Patient may call or return to office with any questions or concerns  ______________________________________________________________________  Subjective:     Patient ID: Rebekah Will is a 42 y o  female  HPI  [facility]  Chief Complaint   Patient presents with   • Cough   • Wheezing     Was in chest feels like its moved down   • Headache     Mucus color was green, now better, more white  Finished abx this week & medrol last week  Coughing so hard she puked yesterday  Has been working  Saff had stomach bug, but better  HA frontal, and pressure with coughing  Sleeping with robitussin, sitting up  Couldn't sleep with steroid angus  The following portions of the patient's history were reviewed and updated as appropriate: allergies, current medications, past medical history, and problem list     Review of Systems   Constitutional: Positive for fatigue  Negative for chills and fever  HENT: Positive for congestion, postnasal drip, rhinorrhea and voice change  Negative for ear discharge, ear pain and sore throat  Respiratory: Positive for cough, shortness of breath and wheezing  Crackling on bases with coughing  Some stridorous sounds   Cardiovascular: Negative for chest pain and palpitations  Ribs painful   Musculoskeletal: Positive for myalgias  Negative for arthralgias  Neurological: Positive for headaches  Negative for dizziness and light-headedness  Objective:      Vitals:    02/24/23 1534   BP: 118/88   Pulse: 94   Temp: 98 8 °F (37 1 °C)   SpO2: 99%      Physical Exam  Vitals reviewed  Constitutional:       General: She is not in acute distress  Appearance: Normal appearance  She is well-developed  She is not ill-appearing  HENT:      Head: Normocephalic and atraumatic  Right Ear: Ear canal and external ear normal  There is no impacted cerumen  Left Ear: Ear canal and external ear normal       Ears:      Comments: Mucoid appearing mild     Nose: Nose normal  No congestion  Comments: Sinus TTP mild     Mouth/Throat:      Mouth: Mucous membranes are moist       Pharynx: Oropharynx is clear  No oropharyngeal exudate  Eyes:      General: No scleral icterus  Right eye: No discharge  Left eye: No discharge  Cardiovascular:      Rate and Rhythm: Normal rate and regular rhythm  Pulses: Normal pulses  Heart sounds: Normal heart sounds  No murmur heard  Comments: Tender on lateral ribs  Pulmonary:      Effort: Pulmonary effort is normal  No respiratory distress  Breath sounds: No stridor  Wheezing present  Musculoskeletal:      Cervical back: Normal range of motion and neck supple  No rigidity or tenderness  Right lower leg: No edema  Left lower leg: No edema  Lymphadenopathy:      Cervical: No cervical adenopathy  Skin:     General: Skin is warm  Neurological:      Mental Status: She is alert and oriented to person, place, and time  Gait: Gait normal    Psychiatric:         Mood and Affect: Mood normal          Behavior: Behavior normal          Thought Content: Thought content normal          Judgment: Judgment normal            Portions of the record may have been created with voice recognition software  Occasional wrong word or "sound alike" substitutions may have occurred due to the inherent limitations of voice recognition software   Please review the chart carefully and chest pain, chest discomfort or shortness of breath. She denies any orthopnea, PND or lower extremity edema. She is entirely without cardiovascular complaints.      PAST MEDICAL HISTORY:  Past Medical History:   Diagnosis Date     Abdominal discomfort 12/2013    ct abd and pelvis no cause found     Cancer (H) 2017    Basal cell left forarm     Chest pain 02/2014    neg est echo     Colonic polyp 2005    fu 2011 nl and to fu 2021     Coronary artery disease involving native coronary artery of native heart without angina pectoris 2015    NSTEMI/Vfib arrest/stents x2     Dizzy 2009    mri brain nl     DJD (degenerative joint disease)     neck     Elevated blood sugar      GERD (gastroesophageal reflux disease) 2007    egd, benign stricture     Hypercholesteremia 2010    crestor intolerant, lipitor intolerant     CORINA (iron deficiency anaemia) 2008    ugi and sbft nl, be nl     Left ventricular diastolic dysfunction 01/2016    echo done for fu and nl ef, no wma     Osteopenia     fu dexa 2011 -1.9 fem neck and -1.2 spine, stable c/w 2007     Osteopenia of both hips 10/2019    screening dexa     PONV (postoperative nausea and vomiting)      STEMI (ST elevation myocardial infarction) (H) 12/2015    in [place] on cruise, lytics then ptca of lad, 2 stents, had vfib arrest, ef on angio 67%, 40% rca, no other dz; fu est echo 12/16 nl     Syncope     episode August 2017, possible vasovagal     Tubular adenoma of colon 04/2021    fu 5 years     Urethral strictures      Ventricular fibrillation (H) 12/2015    during mi     Vitamin D deficiency        MEDICATIONS:  Current Outpatient Medications   Medication     aspirin 81 MG EC tablet     carvedilol (COREG) 3.125 MG tablet     Cholecalciferol (VITAMIN D3 PO)     coenzyme Q-10 (CO-Q10) 50 MG capsule     Cranberry 1000 MG CAPS     nitroGLYcerin (NITROSTAT) 0.4 MG sublingual tablet     omeprazole (PRILOSEC) 20 MG capsule     rosuvastatin (CRESTOR) 10 MG tablet     No current  facility-administered medications for this visit.       ALLERGIES:  Allergies   Allergen Reactions     Crestor [Rosuvastatin]      Muscle aches, debilitating     Codeine Nausea and Vomiting     Shrimp Nausea and Vomiting       SOCIAL HISTORY:  I have reviewed this patient's social history and updated it with pertinent information if needed. Madonna Duque  reports that she quit smoking about 40 years ago. Her smoking use included cigarettes. She started smoking about 48 years ago. She has a 5.00 pack-year smoking history. She has never used smokeless tobacco. She reports that she does not drink alcohol and does not use drugs.    FAMILY HISTORY:  I have reviewed this patient's family history and updated it with pertinent information if needed.   Family History   Problem Relation Age of Onset     Cancer Mother         CLL     Hypertension Mother      Cerebrovascular Disease Mother      Diabetes Father      Cancer Father         Castlemans     Other Cancer Father         Castelman's disease     Other Cancer Sister         Endometrial cancer     Thyroid Disease Sister      Diabetes Brother      Diabetes Paternal Aunt      Diabetes Paternal Aunt      Diabetes Paternal Aunt      Breast Cancer Paternal Aunt      Cancer - colorectal Paternal Aunt      Cancer - colorectal Paternal Aunt      Diabetes Paternal Uncle      Diabetes Paternal Uncle      Diabetes Paternal Uncle      Cancer Paternal Uncle         bladder       REVIEW OF SYSTEMS:  A complete ROS was obtained and the pertinent positives are outlined in the history of present illness above.  The remainder of systems is negative.      PHYSICAL EXAM:                     Vital Signs with Ranges     0 lbs 0 oz    Constitutional: awake, alert, no distress  Eyes: PERRL, sclera nonicteric  ENT: trachea midline  Respiratory: CTAB  Cardiovascular: RRR no m/r/g, no JVD  GI: nondistended, nontender, bowel sounds present  Lymph/Hematologic: no lymphadenopathy  Skin: dry, no  recognize, using context, where substitutions/typographical errors may have occurred  rash  Musculoskeletal: good muscle tone, no edema bilaterally  Neurologic: no focal deficits  Neuropsychiatric: appropriate affact    DATA:  Labs:    HDL 59    ASSESSMENT:  1.  Coronary artery disease:  S/P anterior STEMI complicated by VF arrest; 2 KISHA to LAD.  Exercise stress echocardiogram demonstrated normal LV function with no inducible ischemia.  She is feeling well without symptoms concerning for obstructive CAD.  2.  Hyperlipidemia:  .  Difficulty with statins in the past and did not tolerate praluent, but currently tolerating rosuvastatin 10 mg daily with CoQ10.  She has participated in a number of studies through Dr. Perez' lipid clinic.  She has been on zetia in the past, which did not change her LDL and it was ultimatel stopped.        RECOMMENDATIONS:  1. Continue current cardiac meds; no changes made today.  2.  Plan for follow up in 12 months or before than as needed.      Delmy Mccurdy MD [facility]  January 25, 2022            Today's clinic visit entailed:  Review of the result(s) of each unique test - lipid panel, exercise stress echocardiogram  30 minutes spent on the date of the encounter doing chart review, history and exam, documentation and further activities per the note  Provider  Link to [facility] Help Grid     The level of medical decision making during this visit was of moderate complexity.    Thank you for allowing me to participate in the care of your patient.      Sincerely,     Delmy Mccurdy MD     [facility]  cc:   No referring provider defined for this encounter.

## 2023-08-17 ENCOUNTER — TELEMEDICINE (OUTPATIENT)
Dept: FAMILY MEDICINE CLINIC | Facility: HOSPITAL | Age: 38
End: 2023-08-17
Payer: COMMERCIAL

## 2023-08-17 ENCOUNTER — TELEPHONE (OUTPATIENT)
Dept: FAMILY MEDICINE CLINIC | Facility: HOSPITAL | Age: 38
End: 2023-08-17

## 2023-08-17 DIAGNOSIS — D84.9 IMMUNE DEFICIENCY DISORDER (HCC): ICD-10-CM

## 2023-08-17 DIAGNOSIS — J45.990 EXERCISE-INDUCED ASTHMA: ICD-10-CM

## 2023-08-17 DIAGNOSIS — F41.1 GENERALIZED ANXIETY DISORDER: ICD-10-CM

## 2023-08-17 DIAGNOSIS — U07.1 COVID-19: Primary | ICD-10-CM

## 2023-08-17 PROCEDURE — 99214 OFFICE O/P EST MOD 30 MIN: CPT | Performed by: NURSE PRACTITIONER

## 2023-08-17 RX ORDER — CHOLECALCIFEROL (VITAMIN D3) 50 MCG
2000 TABLET ORAL DAILY
Qty: 30 TABLET | Refills: 0 | Status: SHIPPED | OUTPATIENT
Start: 2023-08-17

## 2023-08-17 RX ORDER — AZITHROMYCIN 250 MG/1
250 TABLET, FILM COATED ORAL 2 TIMES WEEKLY
COMMUNITY

## 2023-08-17 RX ORDER — NIRMATRELVIR AND RITONAVIR 300-100 MG
3 KIT ORAL 2 TIMES DAILY
Qty: 30 TABLET | Refills: 0 | Status: SHIPPED | OUTPATIENT
Start: 2023-08-17 | End: 2023-08-22

## 2023-08-17 NOTE — TELEPHONE ENCOUNTER
Hi, my name is Eliz Weeks. My date of birth is [birth year]. I'm sorry if I'm leaving a message on the wrong line. I'm not quite sure how I got to this line, but I'm calling because yesterday I had a positive COVID test. 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 But I wasn't sure if I should be taking, like, an antiviral for having COVID. It is kind of making me feel like a little stuffy in my nose and like a little tight in my chest. But I have a pulse oximeter and it says that my pulse ox is like 9899. I do have some shortness of breath, but again, I'm not sure if it's something that I should be taking an antiviral for if I needed to schedule an appointment to try to come in. I'm feeling kind of weak, but I could try to come in and. Or if you guys would just do that over the phone. I'm so sorry. This is rambling. I'm getting a little foggy. My phone number is [phone]. Again, that's [phone]. Thank you.

## 2023-08-17 NOTE — PROGRESS NOTES
COVID-19 Outpatient Progress Note    Assessment/Plan:    Problem List Items Addressed This Visit        Respiratory    Exercise-induced asthma       Other    Generalized anxiety disorder    Immune deficiency disorder ([address])   Other Visit Diagnoses     COVID-19    -  Primary    Relevant Medications    nirmatrelvir & ritonavir (Paxlovid, 300/100,) tablet therapy pack    Cholecalciferol (Vitamin D) 50 MCG (2000 UT) tablet         Disposition:     Discussed symptom directed medication options with patient. Discussed vitamin D, vitamin C, and/or zinc supplementation with patient. Discussed risks, benefits, and side effects of Remdesivir with patient and they agree to treatment. Clinical trials have shown a significant reduction in hospitalization when Remdesivir was given for 3 days in mild to moderate COVID-19 patients within seven days of symptom onset. Recommended dosing is 200 mg IV once on day 1, followed by 100mg IV on days 2-3. Most common adverse reactions can include: nausea, vomiting, diarrhea, headaches, rash, infusion reactions. Other less common reactions can include: bradycardia, seizure, hypersensitivity reactions, anaphylaxis/angioedema, elevated LFTs. Patient meets criteria for PAXLOVID and they have been counseled appropriately according to EUA documentation released by the FDA. After discussion, patient agrees to treatment. Arlene Chalet is an investigational medicine used to treat mild-to-moderate COVID-19 in adults and children (15years of age and older weighing at least 80 pounds (40 kg)) with positive results of direct SARS-CoV-2 viral testing, and who are at high risk for progression to severe COVID-19, including hospitalization or death. PAXLOVID is investigational because it is still being studied. There is limited information about the safety and effectiveness of using PAXLOVID to treat people with mild-to-moderate COVID-19.     The FDA has authorized the emergency use of PAXLOVID for the treatment of mild-tomoderate COVID-19 in adults and children (15years of age and older weighing at least 80 pounds (40 kg)) with a positive test for the virus that causes COVID-19, and who are at high risk for progression to severe COVID-19, including hospitalization or death, under an EUA. What should I tell my healthcare provider before I take PAXLOVID? Tell your healthcare provider if you:  - Have any allergies  - Have liver or kidney disease  - Are pregnant or plan to become pregnant  - Are breastfeeding a child  - Have any serious illnesses    Tell your healthcare provider about all the medicines you take, including prescription and over-the-counter medicines, vitamins, and herbal supplements. Some medicines may interact with PAXLOVID and may cause serious side effects. Keep a list of your medicines to show your healthcare provider and pharmacist when you get a new medicine. You can ask your healthcare provider or pharmacist for a list of medicines that interact with PAXLOVID. Do not start taking a new medicine without telling your healthcare provider. Your healthcare provider can tell you if it is safe to take PAXLOVID with other medicines. Tell your healthcare provider if you are taking combined hormonal contraceptive. PAXLOVID may affect how your birth control pills work. Females who are able to become pregnant should use another effective alternative form of contraception or an additional barrier method of contraception. Talk to your healthcare provider if you have any questions about contraceptive methods that might be right for you. How do I take PAXLOVID? PAXLOVID consists of 2 medicines: nirmatrelvir and ritonavir. - Take 2 pink tablets of nirmatrelvir with 1 white tablet of ritonavir by mouth 2 times each day (in the morning and in the evening) for 5 days. For each dose, take all 3 tablets at the same time. - If you have kidney disease, talk to your healthcare provider.  You may need a different dose. - Swallow the tablets whole. Do not chew, break, or crush the tablets  - Take PAXLOVID with or without food. - Do not stop taking PAXLOVID without talking to your healthcare provider, even if you feel better. - If you miss a dose of PAXLOVID within 8 hours of the time it is usually taken, take it as soon as you remember. If you miss a dose by more than 8 hours, skip the missed dose and take the next dose at your regular time. Do not take 2 doses of PAXLOVID at the same time. - If you take too much PAXLOVID, call your healthcare provider or go to the nearest hospital emergency room right away. - If you are taking a ritonavir- or cobicistat-containing medicine to treat hepatitis C or Human Immunodeficiency Virus (HIV), you should continue to take your medicine as prescribed by your healthcare provider.  - Talk to your healthcare provider if you do not feel better or if you feel worse after 5 days. Who should generally not take PAXLOVID? Do not take PAXLOVID if:  You are allergic to nirmatrelvir, ritonavir, or any of the ingredients in PAXLOVID. You are taking any of the following medicines:  - Alfuzosin  - Pethidine, piroxicam, propoxyphene  - Ranolazine  - Amiodarone, dronedarone, flecainide, propafenone, quinidine  - Colchicine  - Lurasidone, pimozide, clozapine  - Dihydroergotamine, ergotamine, methylergonovine  - Lovastatin, simvastatin  - Sildenafil (Revatio®) for pulmonary arterial hypertension (PAH)  - Triazolam, oral midazolam  - Apalutamide  - Carbamazepine, phenobarbital, phenytoin  - Rifampin  - Elina’s Wort (hypericum perforatum)    What are the important possible side effects of PAXLOVID? Possible side effects of PAXLOVID are:  - Liver Problems.  Tell your healthcare provider right away if you have any of these signs and symptoms of liver problems: loss of appetite, yellowing of your skin and the whites of eyes (jaundice), dark-colored urine, pale colored stools and itchy skin, stomach area (abdominal) pain. - Resistance to HIV Medicines. If you have untreated HIV infection, PAXLOVID may lead to some HIV medicines not working as well in the future. - Other possible side effects include: altered sense of taste, diarrhea, high blood pressure, or muscle aches    These are not all the possible side effects of PAXLOVID. Not many people have taken PAXLOVID. Serious and unexpected side effects may happen. Angely Liu is still being studied, so it is possible that all of the risks are not known at this time. What other treatment choices are there? Like Diana Ala may allow for the emergency use of other medicines to treat people with COVID-19. Go to https://QRGL/ for information on the emergency use of other medicines that are authorized by FDA to treat people with COVID-19. Your healthcare provider may talk with you about clinical trials for which you may be eligible. It is your choice to be treated or not to be treated with PAXLOVID. Should you decide not to receive it or for your child not to receive it, it will not change your standard medical care. What if I am pregnant or breastfeeding? There is no experience treating pregnant women or breastfeeding mothers with PAXLOVID. For a mother and unborn baby, the benefit of taking PAXLOVID may be greater than the risk from the treatment. If you are pregnant, discuss your options and specific situation with your healthcare provider. It is recommended that you use effective barrier contraception or do not have sexual activity while taking PAXLOVID. If you are breastfeeding, discuss your options and specific situation with your healthcare provider. How do I report side effects with PAXLOVID? Contact your healthcare provider if you have any side effects that bother you or do not go away.     Report side effects to FDA MedWatch at www.fda.gov/medwatch or call 0-108-DSN8536 or you can report side effects to [facility]. at the contact information provided below. Website Fax number Telephone number   [facility] [phone]     How should I store [address] [place] [address]? Store PAXLOVID tablets at room temperature between 68°F to 77°F (20°C to 25°C). Full fact sheet for patients, parents, and caregivers can be found at: "[employer].co.za    I have spent a total time of 15 minutes on the day of the encounter for this patient including risks and benefits of treatment options, instructions for management, patient and family education, risk factor reductions, impressions and counseling/coordination of care. Discussed use of Paxlovid, side effects, potential rebound. Discussed when to seek emergent care. Encounter provider: JEY Clemente     Provider located at: [address] [place] [address] PRIMARY CARE [address] [place] [phone]     Recent Visits  No visits were found meeting these conditions. Showing recent visits within past 7 days and meeting all other requirements  Today's Visits  Date Type Provider Dept   08/17/23 Telemedicine JEY Philippe  [facility]   08/17/23 Telephone Nancy Retana [facility]   Showing today's visits and meeting all other requirements  Future Appointments  No visits were found meeting these conditions. Showing future appointments within next 150 days and meeting all other requirements     This virtual check-in was done via [address] [place] [address] and patient was informed that this is a secure, HIPAA-compliant platform. She agrees to proceed. Patient agrees to participate in a virtual check in via telephone or video visit instead of presenting to the office to address urgent/immediate medical needs.  Patient is aware this is a billable service. She acknowledged consent and understanding of privacy and security of the video platform. The patient has agreed to participate and understands they can discontinue the visit at any time. After connecting through [facility], the patient was identified by name and date of birth. Diony Welch was informed that this was a telemedicine visit and that the exam was being conducted confidentially over secure lines. My office door was closed. No one else was in the room. Elke Moya acknowledged consent and understanding of privacy and security of the telemedicine visit. I informed the patient that I have reviewed her record in Epic and presented the opportunity for her to ask any questions regarding the visit today. The patient agreed to participate. Verification of patient location:  Patient is located in the following state in which I hold an active license: [place]    Subjective:   Diony Welch is a 42 y.o. female who is concerned about COVID-19. Patient's symptoms include nasal congestion, shortness of breath and diarrhea. Patient denies fever, chills, fatigue and cough. - Date of symptom onset: 8/16/2023      COVID-19 vaccination status: Fully vaccinated with booster    Adherent to flovent, has albuterol if needed. Feels a little foggy. Pulse ox 98-99% RA. No results found for: "SARSCOV2", "[facility]", "[address] [place] [address],[address]", "CORONAVIRUSR", "[facility]", "[facility]"    Review of Systems   Constitutional: Negative. Negative for activity change, appetite change, chills, fatigue and fever. HENT: Positive for congestion. Negative for ear pain, postnasal drip and sinus pain. Eyes: Negative. Respiratory: Positive for shortness of breath. Negative for cough. Cardiovascular: Negative. Negative for chest pain and leg swelling. Gastrointestinal: Positive for diarrhea. Negative for constipation. Endocrine: Negative. Genitourinary: Negative. Negative for dysuria. Musculoskeletal: Negative. Skin: Negative. Allergic/Immunologic: Negative. Negative for immunocompromised state. Neurological: Negative. Negative for dizziness and light-headedness. Hematological: Negative. Psychiatric/Behavioral: Positive for decreased concentration. Current Outpatient Medications on File Prior to Visit   Medication Sig   • azithromycin (ZITHROMAX) 250 mg tablet Take 250 mg by mouth 2 (two) times a week   • albuterol (Ventolin HFA) 90 mcg/act inhaler Inhale 1-2 puffs every 6 (six) hours as needed for wheezing or shortness of breath   • fluticasone (Flovent HFA) 44 mcg/act inhaler Inhale 2 puffs 2 (two) times a day Rinse mouth after use. • Fluticasone Propionate (FLONASE ALLERGY RELIEF NA) 1 to 2 sprays daily prn   • guaiFENesin (MUCINEX) 600 mg 12 hr tablet Take 600 mg by mouth daily   • Multiple Vitamin (MULTIVITAMIN) tablet Take 1 tablet by mouth daily   • Multiple Vitamins-Minerals (Hair Skin and Nails Formula) TABS    • pseudoephedrine (SUDAFED) 30 mg tablet Take 30 mg by mouth daily       Objective: There were no vitals taken for this visit. Physical Exam  Vitals and nursing note reviewed. Constitutional:       General: She is not in acute distress. Appearance: She is well-developed. HENT:      Head: Normocephalic and atraumatic. Nose: Congestion present. Eyes:      Conjunctiva/sclera: Conjunctivae normal.   Cardiovascular:      Heart sounds: No murmur heard. Pulmonary:      Effort: Pulmonary effort is normal. No respiratory distress. Abdominal:      Tenderness: There is no abdominal tenderness. Musculoskeletal:         General: No swelling. Normal range of motion. Cervical back: Normal range of motion. Neurological:      General: No focal deficit present. Mental Status: She is alert and oriented to person, place, and time.    Psychiatric:         Mood and Affect: Mood normal.         Behavior: Behavior normal.       Nancy Poe Precious Martinez

## 2023-08-24 ENCOUNTER — TELEMEDICINE (OUTPATIENT)
Dept: FAMILY MEDICINE CLINIC | Facility: HOSPITAL | Age: 38
End: 2023-08-24
Payer: COMMERCIAL

## 2023-08-24 VITALS — WEIGHT: 248 LBS | TEMPERATURE: 98.8 F | BODY MASS INDEX: 41.27 KG/M2

## 2023-08-24 DIAGNOSIS — D84.9 IMMUNE DEFICIENCY DISORDER (HCC): ICD-10-CM

## 2023-08-24 DIAGNOSIS — J45.990 EXERCISE-INDUCED ASTHMA: ICD-10-CM

## 2023-08-24 DIAGNOSIS — U07.1 COVID-19: Primary | ICD-10-CM

## 2023-08-24 PROCEDURE — 99213 OFFICE O/P EST LOW 20 MIN: CPT | Performed by: NURSE PRACTITIONER

## 2023-08-24 NOTE — PROGRESS NOTES
COVID-19 Outpatient Progress Note    Assessment/Plan:    Problem List Items Addressed This Visit        Respiratory    Exercise-induced asthma       Other    Immune deficiency disorder ([address])    COVID-19 - Primary      Disposition:     I have spent a total time of 10 minutes on the day of the encounter for this patient including risks and benefits of treatment options, instructions for management, patient and family education and impressions. Reinforced that COVID rebound is a side effect with Paxlovid for some patients. Continue symptom management. Rest, push fluids and optimize nutrition. Continue to wear mask at work. Reach out if short of breath, chest pressure/pain, fever/chills. Encounter provider: Lucien Cabot, CRNP     Provider located at: [address] [place] [phone]     Recent Visits  Date Type Provider Dept   08/17/23 Telephone [address] [place] [facility]   Showing recent visits within past 7 days and meeting all other requirements  Today's Visits  Date Type Provider Dept   08/24/23 Telemedicine JEY Oswald [facility]   Showing today's visits and meeting all other requirements  Future Appointments  No visits were found meeting these conditions. Showing future appointments within next 150 days and meeting all other requirements     This virtual check-in was done via [address] [place] [address] and patient was informed that this is a secure, HIPAA-compliant platform. She agrees to proceed. Patient agrees to participate in a virtual check in via telephone or video visit instead of presenting to the office to address urgent/immediate medical needs. Patient is aware this is a billable service. She acknowledged consent and understanding of privacy and security of the video platform.  The patient has agreed to participate and understands they can discontinue the visit at any time. After connecting through [facility], the patient was identified by name and date of birth. Shania Palma was informed that this was a telemedicine visit and that the exam was being conducted confidentially over secure lines. My office door was closed. No one else was in the room. Elke Moya acknowledged consent and understanding of privacy and security of the telemedicine visit. I informed the patient that I have reviewed her record in Epic and presented the opportunity for her to ask any questions regarding the visit today. The patient agreed to participate. Verification of patient location:  Patient is located in the following state in which I hold an active license: [place]    Subjective:   Shania Palma is a 42 y.o. female who has been screened for COVID-19. Symptom change since last report: unchanged. Patient's symptoms include fatigue, cough and headache. Patient denies fever, chills, shortness of breath and chest tightness.     - Date of symptom onset: 8/16/2023  - Date of positive COVID-19 test: 8/17/2023. Type of test: Home antigen. COVID-19 vaccination status: Fully vaccinated with booster    Elke has been staying home and has isolated themselves in her home. She is taking care to not share personal items and is cleaning all surfaces that are touched often, like counters, tabletops, and doorknobs using household cleaning sprays or wipes. She is wearing a mask when she leaves her room. [place] like she had a nasty cold, then took paxlovid which made her feel baseline. Last dose Tuesday morning. Wednesday went back to work with a mask and by the afternoon was feeling fatigue/dizzy with a HA. Today increased fatigue, persistent HA. Sleeping a lot. Daughter and spouse ill as well. No results found for: "SARSCOV2", "[facility]", "[address] [facility],[address]", "CORONAVIRUSR", "[facility]", "[facility]"    Review of Systems   Constitutional: Positive for fatigue.  Negative for chills and fever. Respiratory: Positive for cough. Negative for chest tightness and shortness of breath. Cough is almost resolved, dry   Neurological: Positive for dizziness and headaches. Psychiatric/Behavioral: Positive for sleep disturbance. Current Outpatient Medications on File Prior to Visit   Medication Sig   • albuterol (Ventolin HFA) 90 mcg/act inhaler Inhale 1-2 puffs every 6 (six) hours as needed for wheezing or shortness of breath   • azithromycin (ZITHROMAX) 250 mg tablet Take 250 mg by mouth 2 (two) times a week   • Cholecalciferol (Vitamin D) 50 MCG (2000 UT) tablet Take 1 tablet (2,000 Units total) by mouth daily   • fluticasone (Flovent HFA) 44 mcg/act inhaler Inhale 2 puffs 2 (two) times a day Rinse mouth after use. • Fluticasone Propionate (FLONASE ALLERGY RELIEF NA) 1 to 2 sprays daily prn   • Multiple Vitamin (MULTIVITAMIN) tablet Take 1 tablet by mouth daily   • Multiple Vitamins-Minerals (Hair Skin and Nails Formula) TABS    • [DISCONTINUED] guaiFENesin (MUCINEX) 600 mg 12 hr tablet Take 600 mg by mouth daily   • [DISCONTINUED] pseudoephedrine (SUDAFED) 30 mg tablet Take 30 mg by mouth daily       Objective:    Temp 98.8 °F (37.1 °C)   Wt 112 kg (248 lb)   LMP 08/22/2023   BMI 41.27 kg/m²        Physical Exam  Constitutional:       General: She is awake. Appearance: Normal appearance. She is not ill-appearing. HENT:      Head: Normocephalic and atraumatic. Nose: Nose normal.   Eyes:      Pupils: Pupils are equal, round, and reactive to light. Pulmonary:      Effort: Pulmonary effort is normal.   Musculoskeletal:         General: Normal range of motion. Cervical back: Normal range of motion. Neurological:      Mental Status: She is alert and oriented to person, place, and time. Psychiatric:         Mood and Affect: Mood normal.         Behavior: Behavior normal. Behavior is cooperative.        JEY Jimenez

## 2023-08-24 NOTE — LETTER
August 24, 2023     Patient: Cheryl Mckeon  YOB: 1985  Date of Visit: 8/24/2023      To Whom it May Concern:    Eriberto Rodriguez is under my professional care. Elke was seen in my office on 8/24/2023. Norah Metzger may return to work on Monday 8/28/23 if fever free x24 hours . If you have any questions or concerns, please don't hesitate to call.          Sincerely,          Lucien Cabot, CRNP        CC: No Recipients

## 2023-10-30 ENCOUNTER — HOSPITAL ENCOUNTER (OUTPATIENT)
Dept: MAMMOGRAPHY | Facility: IMAGING CENTER | Age: 38
Discharge: HOME | End: 2023-10-30
Payer: COMMERCIAL

## 2023-10-30 VITALS — BODY MASS INDEX: 40.18 KG/M2 | WEIGHT: 250 LBS | HEIGHT: 66 IN

## 2023-10-30 DIAGNOSIS — Z12.31 ENCOUNTER FOR MAMMOGRAM TO ESTABLISH BASELINE MAMMOGRAM: ICD-10-CM

## 2023-10-30 PROCEDURE — 77063 BREAST TOMOSYNTHESIS BI: CPT

## 2023-10-30 PROCEDURE — 77067 SCR MAMMO BI INCL CAD: CPT

## 2023-11-22 ENCOUNTER — OFFICE VISIT (OUTPATIENT)
Dept: FAMILY MEDICINE CLINIC | Facility: HOSPITAL | Age: 38
End: 2023-11-22
Payer: COMMERCIAL

## 2023-11-22 VITALS
SYSTOLIC BLOOD PRESSURE: 120 MMHG | HEIGHT: 66 IN | BODY MASS INDEX: 40.5 KG/M2 | DIASTOLIC BLOOD PRESSURE: 84 MMHG | OXYGEN SATURATION: 99 % | WEIGHT: 252 LBS | HEART RATE: 105 BPM

## 2023-11-22 DIAGNOSIS — J30.1 SEASONAL ALLERGIC RHINITIS DUE TO POLLEN: ICD-10-CM

## 2023-11-22 DIAGNOSIS — J01.00 ACUTE NON-RECURRENT MAXILLARY SINUSITIS: ICD-10-CM

## 2023-11-22 DIAGNOSIS — H65.111 ACUTE MUCOID OTITIS MEDIA OF RIGHT EAR: Primary | ICD-10-CM

## 2023-11-22 PROBLEM — J02.0 STREP PHARYNGITIS: Status: RESOLVED | Noted: 2023-01-14 | Resolved: 2023-11-22

## 2023-11-22 PROBLEM — U07.1 COVID-19: Status: RESOLVED | Noted: 2023-08-17 | Resolved: 2023-11-22

## 2023-11-22 PROCEDURE — 99213 OFFICE O/P EST LOW 20 MIN: CPT | Performed by: STUDENT IN AN ORGANIZED HEALTH CARE EDUCATION/TRAINING PROGRAM

## 2023-11-22 RX ORDER — AMOXICILLIN AND CLAVULANATE POTASSIUM 875; 125 MG/1; MG/1
1 TABLET, FILM COATED ORAL EVERY 12 HOURS SCHEDULED
Qty: 20 TABLET | Refills: 0 | Status: SHIPPED | OUTPATIENT
Start: 2023-11-22 | End: 2023-12-02

## 2023-11-22 NOTE — PROGRESS NOTES
[address], DO    Assessment/Plan:      Diagnosis ICD-10-CM Associated Orders   1. Acute mucoid otitis media of right ear  H65.111 amoxicillin-clavulanate (AUGMENTIN) 875-125 mg per tablet      2. Acute non-recurrent maxillary sinusitis  J01.00 amoxicillin-clavulanate (AUGMENTIN) 875-125 mg per tablet      3. Seasonal allergic rhinitis due to pollen  J30.1         No further imaging at this time. Will try augmentin as above. C/W nasla rinses & flonase. Could add mucinex or claritin while sick/   Try Vit C, D, Zinc.   Return if symptoms worsen or fail to improve - Scheduled next month. Patient may call or return to office with any questions or concerns. ______________________________________________________________________  Subjective:     Patient ID: Yuniel Franco is a 34 y.o. female. [facility]  Chief Complaint   Patient presents with    Earache     Bilateral ear pain   Daughter has RSV     Saffron became sick last week. Diagnosed with RSV, coughing, tired. Pulling at ears. Had ear infxn just prior. Given amoxil and then augmentin. Cyanotic coughing, went to ED. Mom started getting sick after. Not really having much cough. Ears popped and now ears are hurting. Used sudafed, felt slightly better Friday. Now nose problems & ears painful/ringing/stuffy again. R > L.      Wt Readings from Last 3 Encounters:   11/22/23 114 kg (252 lb)   10/30/23 113 kg (250 lb)   08/24/23 112 kg (248 lb)     Temp Readings from Last 3 Encounters:   08/24/23 98.8 °F (37.1 °C)   02/24/23 98.8 °F (37.1 °C)   01/14/23 99 °F (37.2 °C)     BP Readings from Last 3 Encounters:   11/22/23 120/84   02/24/23 118/88   02/14/23 129/86     Pulse Readings from Last 3 Encounters:   11/22/23 105   02/24/23 94   02/14/23 89          The following portions of the patient's history were reviewed and updated as appropriate: allergies, current medications, past medical history, and problem list.    Review of Systems   Constitutional:  Positive for fatigue. Negative for chills and fever. HENT:  Positive for congestion, rhinorrhea, sinus pressure, sinus pain and voice change (raspy). Negative for sore throat. Eyes:  Negative for pain, redness and itching. Respiratory:  Positive for cough (occas). Negative for chest tightness and shortness of breath. Mild MCGOWAN +    Cardiovascular:  Negative for chest pain and palpitations. Gastrointestinal:  Negative for diarrhea, nausea and vomiting. Musculoskeletal:  Negative for arthralgias and myalgias. Neurological:  Positive for dizziness (brief this morning). Negative for light-headedness and headaches. Objective:      Vitals:    11/22/23 1206   BP: 120/84   Pulse: 105   SpO2: 99%      Physical Exam  Vitals and nursing note reviewed. Constitutional:       General: She is not in acute distress. Appearance: Normal appearance. She is obese. She is not ill-appearing. HENT:      Head: Normocephalic and atraumatic. Right Ear: Ear canal and external ear normal. There is no impacted cerumen. Left Ear: Tympanic membrane, ear canal and external ear normal. There is no impacted cerumen. Ears:      Comments: Right mucoid TM, and mildly erythematous canal     Nose: Congestion present. Mouth/Throat:      Mouth: Mucous membranes are moist.      Pharynx: Oropharynx is clear. Posterior oropharyngeal erythema (minimal on left tonsil, slightly puffy as well) present. No oropharyngeal exudate. Comments: Raspy voice, rare cough  Eyes:      General: No scleral icterus. Right eye: No discharge. Left eye: No discharge. Cardiovascular:      Rate and Rhythm: Normal rate and regular rhythm. Pulses: Normal pulses. Heart sounds: Normal heart sounds. No murmur heard. Pulmonary:      Effort: Pulmonary effort is normal. No respiratory distress. Breath sounds: Normal breath sounds. No stridor. No wheezing. Musculoskeletal:      Cervical back: Normal range of motion and neck supple. No rigidity or tenderness. Right lower leg: No edema. Left lower leg: No edema. Lymphadenopathy:      Cervical: No cervical adenopathy. Neurological:      Mental Status: She is alert and oriented to person, place, and time. Gait: Gait normal.   Psychiatric:         Mood and Affect: Mood normal.         Behavior: Behavior normal.         Thought Content: Thought content normal.         Judgment: Judgment normal.           Portions of the record may have been created with voice recognition software. Occasional wrong word or "sound alike" substitutions may have occurred due to the inherent limitations of voice recognition software. Please review the chart carefully and recognize, using context, where substitutions/typographical errors may have occurred.

## 2024-02-08 ENCOUNTER — ANNUAL EXAM (OUTPATIENT)
Dept: GYNECOLOGY | Facility: CLINIC | Age: 39
End: 2024-02-08
Payer: COMMERCIAL

## 2024-02-08 VITALS — SYSTOLIC BLOOD PRESSURE: 130 MMHG | WEIGHT: 250 LBS | BODY MASS INDEX: 40.35 KG/M2 | DIASTOLIC BLOOD PRESSURE: 80 MMHG

## 2024-02-08 DIAGNOSIS — Z00.6 ENCOUNTER FOR EXAMINATION FOR NORMAL COMPARISON OR CONTROL IN CLINICAL RESEARCH PROGRAM: ICD-10-CM

## 2024-02-08 DIAGNOSIS — F41.1 GENERALIZED ANXIETY DISORDER: ICD-10-CM

## 2024-02-08 DIAGNOSIS — Z90.49 S/P LAPAROSCOPIC CHOLECYSTECTOMY: ICD-10-CM

## 2024-02-08 DIAGNOSIS — Z92.89 HISTORY OF MAMMOGRAM: ICD-10-CM

## 2024-02-08 DIAGNOSIS — Z01.419 WOMEN'S ANNUAL ROUTINE GYNECOLOGICAL EXAMINATION: ICD-10-CM

## 2024-02-08 DIAGNOSIS — E66.01 MORBID OBESITY (HCC): ICD-10-CM

## 2024-02-08 PROCEDURE — S0612 ANNUAL GYNECOLOGICAL EXAMINA: HCPCS | Performed by: OBSTETRICS & GYNECOLOGY

## 2024-02-08 NOTE — PROGRESS NOTES
Assessment   Annual well woman exam  History of  section x 1  Obesity  History of cholecystectomy        Plan   Screening mammogram ordered   All questions answered.  Breast self exam technique reviewed and patient encouraged to perform self-exam monthly.  Discussed healthy lifestyle modifications.     Subjective here for annual exam     Elke Moya is a 38 y.o. female who presents for annual exam. Periods are regular every 28-30 days, lasting 5 days. Dysmenorrhea:none. Cyclic symptoms include none. No intermenstrual bleeding, spotting, or discharge. The patient reports that there is not domestic violence in her life.     Current contraception: vasectomy  History of abnormal Pap smear: no  Family history of uterine or ovarian cancer: no  Regular self breast exam: yes  History of abnormal mammogram: no  Family history of breast cancer: no  History of abnormal lipids: no  Menstrual History:  OB History               Para        Term                AB        Living             SAB        IAB        Ectopic        Multiple        Live Births   1                Menarche age: 10  Patient's last menstrual period was 01/15/2024.     Review of Systems  Pertinent items are noted in HPI.      Objective no acute distress     /80   Wt 113 kg (250 lb)   LMP 01/15/2024   BMI 40.35 kg/m²     General:   alert and oriented, in no acute distress, alert, appears stated age, and cooperative   Heart: regular rate and rhythm, S1, S2 normal, no murmur, click, rub or gallop   Lungs: clear to auscultation bilaterally   Abdomen: soft, non-tender, without masses or organomegaly   Vulva: normal, Bartholin's, Urethra, [place]'s normal, female escutcheon   Vagina: normal mucosa, normal discharge, no palpable nodules   Cervix: anteverted, no lesions, and nulliparous appearance   Uterus: normal size, anteverted, mobile, non-tender, normal shape and consistency   Adnexa: normal adnexa and no mass, fullness,  tenderness   Bilateral breast exam in the sitting and supine position with chaperone present, no visible or palpable breast lesions identified.  No breast masses noted.    No supraclavicular or axillary lymphadenopathy noted.  No nipple discharge.   Reviewed self-breast exam techniques.   Rectal exam,  deferred

## 2024-02-21 ENCOUNTER — OFFICE VISIT (OUTPATIENT)
Dept: FAMILY MEDICINE CLINIC | Facility: HOSPITAL | Age: 39
End: 2024-02-21
Payer: COMMERCIAL

## 2024-02-21 ENCOUNTER — APPOINTMENT (OUTPATIENT)
Dept: LAB | Facility: HOSPITAL | Age: 39
End: 2024-02-21

## 2024-02-21 VITALS
WEIGHT: 253 LBS | SYSTOLIC BLOOD PRESSURE: 142 MMHG | DIASTOLIC BLOOD PRESSURE: 100 MMHG | BODY MASS INDEX: 40.66 KG/M2 | HEART RATE: 79 BPM | OXYGEN SATURATION: 99 % | HEIGHT: 66 IN

## 2024-02-21 DIAGNOSIS — Z00.6 ENCOUNTER FOR EXAMINATION FOR NORMAL COMPARISON OR CONTROL IN CLINICAL RESEARCH PROGRAM: ICD-10-CM

## 2024-02-21 DIAGNOSIS — Z13.1 SCREENING FOR DIABETES MELLITUS: ICD-10-CM

## 2024-02-21 DIAGNOSIS — Z13.0 SCREENING FOR IRON DEFICIENCY ANEMIA: ICD-10-CM

## 2024-02-21 DIAGNOSIS — Z84.89: ICD-10-CM

## 2024-02-21 DIAGNOSIS — K21.9 GASTROESOPHAGEAL REFLUX DISEASE WITHOUT ESOPHAGITIS: ICD-10-CM

## 2024-02-21 DIAGNOSIS — D84.9 IMMUNE DEFICIENCY DISORDER (HCC): ICD-10-CM

## 2024-02-21 DIAGNOSIS — J45.990 EXERCISE-INDUCED ASTHMA: Primary | ICD-10-CM

## 2024-02-21 DIAGNOSIS — Z13.220 SCREENING FOR HYPERLIPIDEMIA: ICD-10-CM

## 2024-02-21 DIAGNOSIS — F41.1 GENERALIZED ANXIETY DISORDER: ICD-10-CM

## 2024-02-21 DIAGNOSIS — Z13.29 SCREENING FOR THYROID DISORDER: ICD-10-CM

## 2024-02-21 PROCEDURE — 99396 PREV VISIT EST AGE 40-64: CPT | Performed by: STUDENT IN AN ORGANIZED HEALTH CARE EDUCATION/TRAINING PROGRAM

## 2024-02-21 PROCEDURE — 36415 COLL VENOUS BLD VENIPUNCTURE: CPT

## 2024-02-21 PROCEDURE — 99214 OFFICE O/P EST MOD 30 MIN: CPT | Performed by: STUDENT IN AN ORGANIZED HEALTH CARE EDUCATION/TRAINING PROGRAM

## 2024-02-21 RX ORDER — OMEPRAZOLE 20 MG/1
20 CAPSULE, DELAYED RELEASE ORAL DAILY
COMMUNITY

## 2024-02-21 NOTE — PROGRESS NOTES
ADULT ANNUAL PHYSICAL  [facility] PRIMARY CARE [address]    NAME: Elke Moya  AGE: 38 y.o. SEX: female  : 1985   DATE: 2024     Assessment and Plan:     Problem List Items Addressed This Visit        Digestive    Gastroesophageal reflux disease without esophagitis     Stable with PPI otc         Relevant Medications    omeprazole (PriLOSEC) 20 mg delayed release capsule       Respiratory    Exercise-induced asthma - Primary     Has flovent & albuterol inhalers, uses when sick, exercising, or congestion/cough            Other    Immune deficiency disorder (HCC)     Pt being seen by [facility]/allergist - virtuals in  and as needed         Generalized anxiety disorder     Not on meds for DASHAWN, doing much better now        Other Visit Diagnoses     Screening for iron deficiency anemia        Relevant Orders    CBC and Platelet    Screening for diabetes mellitus        Relevant Orders    Comprehensive metabolic panel    Screening for hyperlipidemia        Relevant Orders    Lipid Panel with Direct LDL reflex    Screening for thyroid disorder        Relevant Orders    TSH, 3rd generation    T4, free    Family history of benign neoplasm of pituitary gland and craniopharyngeal duct        Relevant Orders    TSH, 3rd generation    T4, free    ACTH    Insulin-like growth factor 1 (IGF-1)    SALIVARY CORTISOL,ONE SPECIMEN    BMI 40.0-44.9, adult (HCC)        Relevant Orders    TSH, 3rd generation    T4, free    ACTH    Insulin-like growth factor 1 (IGF-1)    SALIVARY CORTISOL,ONE SPECIMEN        Screening & diagnostic bloodwork ordered, our office will reach out with results once obtained.   Will look into pituitary contributing factors for weight & family hx.   Patient's medications were reviewed and reconciled.  Ordered/Re-ordered as above.    UTD with GYN.   Tdap UTD, but not in chart, will get record.   Flu UTD.    Immunizations and preventive care  screenings were discussed with patient today. Appropriate education was printed on patient's after visit summary.    Counseling:  Alcohol/drug use: discussed moderation in alcohol intake, the recommendations for healthy alcohol use, and avoidance of illicit drug use.  Dental Health: discussed importance of regular tooth brushing, flossing, and dental visits.  Injury prevention: discussed safety/seat belts, safety helmets, smoke detectors, carbon dioxide detectors, and smoking near bedding or upholstery.  Sexual health: discussed sexually transmitted diseases, partner selection, use of condoms, avoidance of unintended pregnancy, and contraceptive alternatives.  Exercise: the importance of regular exercise/physical activity was discussed. Recommend exercise 3-5 times per week for at least 30 minutes.          No follow-ups on file.     Chief Complaint:     Chief Complaint   Patient presents with   • Physical Exam      History of Present Illness:     Adult Annual Physical   Patient here for a comprehensive physical exam. The patient reports  : mother had pituitary tumor, took meds for it.  Pt has struggled with weight for a long time, active at the gym. Also gets new weird sensation of breast engorgement without leaking.     Diet and Physical Activity  Diet/Nutrition:  regular .   Exercise: 3d cardio, 2x strength, snow shoeing, hiking  No Drug use.   Tobacco use:  reports that she has never smoked. She has never used smokeless tobacco.  Alcohol use - occasional     Depression Screening  PHQ-2/9 Depression Screening    Little interest or pleasure in doing things: 0 - not at all  Feeling down, depressed, or hopeless: 0 - not at all       General Health  Sleep: sleeps well and gets more than 8 hours of sleep on average.   Hearing: normal - bilateral.  Vision: goes for regular eye exams and wears glasses.   Dental: regular dental visits and brushes teeth twice daily.       /GYN Health  Follows with gynecology? yes -just  seen last month     Review of Systems:     Review of Systems   Constitutional:  Negative for chills and fever.   Respiratory:  Negative for cough and shortness of breath.    Cardiovascular:  Negative for chest pain and palpitations.   Gastrointestinal:  Positive for diarrhea (occas). Negative for nausea and vomiting.        Silent reflux, not bad pain or burning, but mild   Genitourinary:  Negative for dysuria and frequency.   Neurological:  Negative for dizziness (used to happen in the summer after illnesses), light-headedness and headaches.   Psychiatric/Behavioral:  Negative for dysphoric mood. The patient is nervous/anxious (minimal).       Past Medical History:     Past Medical History:   Diagnosis Date   • Anemia     Pernicious anemia   • Asthma    • Avulsion fracture of lateral malleolus of left fibula 2020   • Biliary dyskinesia 2021   • Depression     last assessed 13   • Elevated blood pressure reading     w/o diagnosis of hypertension- Last assessed 09/15/15   • Generalized anxiety disorder     last assessed 14   • GERD (gastroesophageal reflux disease) 2021   • Herniated lumbar intervertebral disc     last assessed 03/10/14   • Lumbar radiculopathy     last assessed 03/10/14   • Lumbosacral spondylosis without myelopathy     last assessed 03/10/14   • Mild episode of recurrent major depressive disorder (HCC) 2018   • Obesity    • PONV (postoperative nausea and vomiting)    • Postpartum anxiety 2019   • Rectal bleeding 2020      Past Surgical History:     Past Surgical History:   Procedure Laterality Date   • BREAST BIOPSY Right     excisional biopsy, negative   • BREAST LUMPECTOMY      Right breast   •  SECTION     • CHOLECYSTECTOMY     • OH LAPAROSCOPY SURG CHOLECYSTECTOMY N/A 2021    Procedure: CHOLECYSTECTOMY LAPAROSCOPIC;  Surgeon: Matt Smith MD;  Location: [facility];  Service: General   • OH RPR UMBILICAL HRNA 5 YRS/>  REDUCIBLE N/A 12/08/2021    Procedure: REPAIR HERNIA UMBILICAL;  Surgeon: Matt Smith MD;  Location: [facility];  Service: General   • TOENAIL EXCISION     • TOOTH EXTRACTION        Social History:     Social History     Socioeconomic History   • Marital status: /Civil Union     Spouse name: None   • Number of children: None   • Years of education: None   • Highest education level: None   Occupational History   • Occupation:    Tobacco Use   • Smoking status: Never   • Smokeless tobacco: Never   Vaping Use   • Vaping status: Never Used   Substance and Sexual Activity   • Alcohol use: Yes     Alcohol/week: 1.0 standard drink of alcohol     Types: 1 Glasses of wine per week     Comment: Was drinking up to/ aprox 3 drinks a week prior to april   • Drug use: Never   • Sexual activity: Yes     Partners: Male     Birth control/protection: Male Sterilization   Other Topics Concern   • None   Social History Narrative    Lives with  their child.    Feels safe at home    Sees dentist reg    Has living will      Social Determinants of Health     Financial Resource Strain: Not on file   Food Insecurity: Not on file   Transportation Needs: Not on file   Physical Activity: Not on file   Stress: Not on file   Social Connections: Not on file   Intimate Partner Violence: Not on file   Housing Stability: Not on file      Family History:     Family History   Problem Relation Age of Onset   • Alcohol abuse Mother         Not biological father. Passed away 5 years ago   • Substance Abuse Mother    • Mental illness Mother         Depression, anxiety, ptsd   • Addiction problem Mother         Alcohol   • Depression Mother         Depression   • Drug abuse Mother    • Alcohol abuse Father    • Substance Abuse Father    • Mental illness Father         Possible bipolar disorder, never disclosed   • Addiction problem Father         Not biological father - drugs and alcohol   • Early death Father         Suicide  "2012   • Completed Suicide  Father    • Drug abuse Father    • No Known Problems Sister    • No Known Problems Daughter    • Addiction problem Maternal Grandmother         Alcohol   • Cancer Maternal Grandmother         I believe it was in her reproductive system   • Addiction problem Maternal Grandfather         Alcohol   • Diabetes Maternal Grandfather    • Irritable bowel syndrome Brother    • Irritable bowel syndrome Brother         Ibs-d   • No Known Problems Maternal Aunt    • No Known Problems Maternal Aunt    • Colon cancer Neg Hx    • Colon polyps Neg Hx       Current Medications:     Current Outpatient Medications   Medication Sig Dispense Refill   • albuterol (Ventolin HFA) 90 mcg/act inhaler Inhale 1-2 puffs every 6 (six) hours as needed for wheezing or shortness of breath 18 g 2   • azithromycin (ZITHROMAX) 250 mg tablet Take 250 mg by mouth 2 (two) times a week     • Cholecalciferol (Vitamin D) 50 MCG (2000 UT) tablet Take 1 tablet (2,000 Units total) by mouth daily 30 tablet 0   • fluticasone (Flovent HFA) 44 mcg/act inhaler Inhale 2 puffs 2 (two) times a day Rinse mouth after use. 10.6 g 1   • Fluticasone Propionate (FLONASE ALLERGY RELIEF NA) 1 to 2 sprays daily prn     • Multiple Vitamin (MULTIVITAMIN) tablet Take 1 tablet by mouth daily     • Multiple Vitamins-Minerals (Hair Skin and Nails Formula) TABS      • omeprazole (PriLOSEC) 20 mg delayed release capsule Take 20 mg by mouth daily       No current facility-administered medications for this visit.      Allergies:     Allergies   Allergen Reactions   • Morphine And Related Hives   • Oxycodone-Acetaminophen Hyperactivity   • Raspberry - Food Allergy Hives, Itching and Throat Swelling   • Bentyl [Dicyclomine] Irritability   • Molds & Smuts Hives, Itching and Rash      Physical Exam:     /100   Pulse 79   Ht 5' 6\" (1.676 m)   Wt 115 kg (253 lb)   LMP 01/15/2024   SpO2 99%   BMI 40.84 kg/m²     Physical Exam  Vitals reviewed. "   Constitutional:       General: She is not in acute distress.     Appearance: Normal appearance. She is obese. She is not ill-appearing.   HENT:      Head: Normocephalic and atraumatic.      Right Ear: Tympanic membrane, ear canal and external ear normal. There is no impacted cerumen.      Left Ear: Tympanic membrane, ear canal and external ear normal. There is no impacted cerumen.      Nose: Nose normal. No congestion or rhinorrhea.      Mouth/Throat:      Mouth: Mucous membranes are moist.      Pharynx: Oropharynx is clear. No oropharyngeal exudate or posterior oropharyngeal erythema.   Eyes:      General: No scleral icterus.        Right eye: No discharge.         Left eye: No discharge.      Conjunctiva/sclera: Conjunctivae normal.   Neck:      Comments: No thyroid nodules & minimal stable thyromegaly.  Cardiovascular:      Rate and Rhythm: Normal rate and regular rhythm.      Pulses: Normal pulses.      Heart sounds: Normal heart sounds. No murmur heard.     No friction rub. No gallop.   Pulmonary:      Effort: Pulmonary effort is normal. No respiratory distress.      Breath sounds: Normal breath sounds. No stridor. No wheezing.   Musculoskeletal:         General: Normal range of motion.      Cervical back: Normal range of motion and neck supple. No rigidity.      Right lower leg: No edema.      Left lower leg: No edema.   Lymphadenopathy:      Cervical: No cervical adenopathy.   Skin:     General: Skin is warm and dry.      Capillary Refill: Capillary refill takes less than 2 seconds.      Coloration: Skin is not jaundiced or pale.   Neurological:      Mental Status: She is alert and oriented to person, place, and time.      Gait: Gait normal.   Psychiatric:         Mood and Affect: Mood normal.         Behavior: Behavior normal.         Thought Content: Thought content normal.         Judgment: Judgment normal.          Emma Sandoval DO  [facility] PR[facility] [address]

## 2024-03-25 LAB
APOB+LDLR+PCSK9 GENE MUT ANL BLD/T: NOT DETECTED
BRCA1+BRCA2 DEL+DUP + FULL MUT ANL BLD/T: NOT DETECTED
MLH1+MSH2+MSH6+PMS2 GN DEL+DUP+FUL M: NOT DETECTED

## 2024-06-20 LAB
ALBUMIN SERPL-MCNC: 4.3 G/DL (ref 3.6–5.1)
ALBUMIN/GLOB SERPL: 1.7 (CALC) (ref 1–2.5)
ALP SERPL-CCNC: 89 U/L (ref 31–125)
ALT SERPL-CCNC: 19 U/L (ref 6–29)
AST SERPL-CCNC: 17 U/L (ref 10–30)
BILIRUB SERPL-MCNC: 0.4 MG/DL (ref 0.2–1.2)
BUN SERPL-MCNC: 11 MG/DL (ref 7–25)
BUN/CREAT SERPL: NORMAL (CALC) (ref 6–22)
CALCIUM SERPL-MCNC: 9.2 MG/DL (ref 8.6–10.2)
CHLORIDE SERPL-SCNC: 104 MMOL/L (ref 98–110)
CHOLEST SERPL-MCNC: 202 MG/DL
CHOLEST/HDLC SERPL: 3.1 (CALC)
CO2 SERPL-SCNC: 26 MMOL/L (ref 20–32)
CREAT SERPL-MCNC: 0.66 MG/DL (ref 0.5–0.99)
ERYTHROCYTE [DISTWIDTH] IN BLOOD BY AUTOMATED COUNT: 13.3 % (ref 11–15)
GFR/BSA.PRED SERPLBLD CYS-BASED-ARV: 114 ML/MIN/1.73M2
GLOBULIN SER CALC-MCNC: 2.6 G/DL (CALC) (ref 1.9–3.7)
GLUCOSE SERPL-MCNC: 93 MG/DL (ref 65–99)
HCT VFR BLD AUTO: 36.9 % (ref 35–45)
HDLC SERPL-MCNC: 65 MG/DL
HGB BLD-MCNC: 12.3 G/DL (ref 11.7–15.5)
LDLC SERPL CALC-MCNC: 112 MG/DL (CALC)
MCH RBC QN AUTO: 25.9 PG (ref 27–33)
MCHC RBC AUTO-ENTMCNC: 33.3 G/DL (ref 32–36)
MCV RBC AUTO: 77.7 FL (ref 80–100)
NONHDLC SERPL-MCNC: 137 MG/DL (CALC)
PLATELET # BLD AUTO: 330 THOUSAND/UL (ref 140–400)
PMV BLD REES-ECKER: 10 FL (ref 7.5–12.5)
POTASSIUM SERPL-SCNC: 4.2 MMOL/L (ref 3.5–5.3)
PROT SERPL-MCNC: 6.9 G/DL (ref 6.1–8.1)
RBC # BLD AUTO: 4.75 MILLION/UL (ref 3.8–5.1)
SODIUM SERPL-SCNC: 139 MMOL/L (ref 135–146)
T4 FREE SERPL-MCNC: 1.1 NG/DL (ref 0.8–1.8)
TRIGL SERPL-MCNC: 139 MG/DL
TSH SERPL-ACNC: 1.77 MIU/L
WBC # BLD AUTO: 6.5 THOUSAND/UL (ref 3.8–10.8)

## 2024-06-24 LAB
ACTH PLAS-MCNC: 19 PG/ML (ref 6–50)
ALBUMIN SERPL-MCNC: 4.3 G/DL (ref 3.6–5.1)
ALBUMIN/GLOB SERPL: 1.7 (CALC) (ref 1–2.5)
ALP SERPL-CCNC: 89 U/L (ref 31–125)
ALT SERPL-CCNC: 19 U/L (ref 6–29)
AST SERPL-CCNC: 17 U/L (ref 10–30)
BILIRUB SERPL-MCNC: 0.4 MG/DL (ref 0.2–1.2)
BUN SERPL-MCNC: 11 MG/DL (ref 7–25)
BUN/CREAT SERPL: NORMAL (CALC) (ref 6–22)
CALCIUM SERPL-MCNC: 9.2 MG/DL (ref 8.6–10.2)
CHLORIDE SERPL-SCNC: 104 MMOL/L (ref 98–110)
CHOLEST SERPL-MCNC: 202 MG/DL
CHOLEST/HDLC SERPL: 3.1 (CALC)
CO2 SERPL-SCNC: 26 MMOL/L (ref 20–32)
CREAT SERPL-MCNC: 0.66 MG/DL (ref 0.5–0.99)
ERYTHROCYTE [DISTWIDTH] IN BLOOD BY AUTOMATED COUNT: 13.3 % (ref 11–15)
GFR/BSA.PRED SERPLBLD CYS-BASED-ARV: 114 ML/MIN/1.73M2
GLOBULIN SER CALC-MCNC: 2.6 G/DL (CALC) (ref 1.9–3.7)
GLUCOSE SERPL-MCNC: 93 MG/DL (ref 65–99)
HCT VFR BLD AUTO: 36.9 % (ref 35–45)
HDLC SERPL-MCNC: 65 MG/DL
HGB BLD-MCNC: 12.3 G/DL (ref 11.7–15.5)
IGF-I SERPL-MCNC: 116 NG/ML (ref 52–328)
IGF-I Z-SCORE SERPL: -0.4 SD
LDLC SERPL CALC-MCNC: 112 MG/DL (CALC)
MCH RBC QN AUTO: 25.9 PG (ref 27–33)
MCHC RBC AUTO-ENTMCNC: 33.3 G/DL (ref 32–36)
MCV RBC AUTO: 77.7 FL (ref 80–100)
NONHDLC SERPL-MCNC: 137 MG/DL (CALC)
PLATELET # BLD AUTO: 330 THOUSAND/UL (ref 140–400)
PMV BLD REES-ECKER: 10 FL (ref 7.5–12.5)
POTASSIUM SERPL-SCNC: 4.2 MMOL/L (ref 3.5–5.3)
PROT SERPL-MCNC: 6.9 G/DL (ref 6.1–8.1)
RBC # BLD AUTO: 4.75 MILLION/UL (ref 3.8–5.1)
SODIUM SERPL-SCNC: 139 MMOL/L (ref 135–146)
T4 FREE SERPL-MCNC: 1.1 NG/DL (ref 0.8–1.8)
TRIGL SERPL-MCNC: 139 MG/DL
TSH SERPL-ACNC: 1.77 MIU/L
WBC # BLD AUTO: 6.5 THOUSAND/UL (ref 3.8–10.8)

## 2024-08-21 ENCOUNTER — OFFICE VISIT (OUTPATIENT)
Dept: FAMILY MEDICINE CLINIC | Facility: HOSPITAL | Age: 39
End: 2024-08-21
Payer: COMMERCIAL

## 2024-08-21 VITALS
WEIGHT: 258 LBS | OXYGEN SATURATION: 99 % | HEART RATE: 89 BPM | DIASTOLIC BLOOD PRESSURE: 84 MMHG | BODY MASS INDEX: 41.46 KG/M2 | HEIGHT: 66 IN | SYSTOLIC BLOOD PRESSURE: 130 MMHG

## 2024-08-21 DIAGNOSIS — Z86.2 HISTORY OF PERNICIOUS ANEMIA: ICD-10-CM

## 2024-08-21 DIAGNOSIS — O92.79 LACTATION SYMPTOM: ICD-10-CM

## 2024-08-21 DIAGNOSIS — F41.1 GENERALIZED ANXIETY DISORDER: ICD-10-CM

## 2024-08-21 DIAGNOSIS — J45.990 EXERCISE-INDUCED ASTHMA: ICD-10-CM

## 2024-08-21 DIAGNOSIS — D84.9 IMMUNE DEFICIENCY DISORDER (HCC): Primary | ICD-10-CM

## 2024-08-21 PROCEDURE — 99215 OFFICE O/P EST HI 40 MIN: CPT | Performed by: STUDENT IN AN ORGANIZED HEALTH CARE EDUCATION/TRAINING PROGRAM

## 2024-08-21 NOTE — PROGRESS NOTES
[facility]   Emma Sandoval DO    Assessment/Plan:      Diagnosis ICD-10-CM Associated Orders   1. Immune deficiency disorder (HCC)  D84.9       2. Exercise-induced asthma  J45.990       3. Generalized anxiety disorder  F41.1       4. BMI 40.0-44.9, adult (HCC)  Z68.41 Vitamin D 25 hydroxy     Hemoglobin A1C W/EAG With Reflex To Glycomark(R)     Vitamin D 25 hydroxy     Hemoglobin A1C W/EAG With Reflex To Glycomark(R)      5. Lactation symptom  O92.79 Prolactin     Prolactin      6. History of pernicious anemia  Z86.2 Vitamin B12     Iron, TIBC and Ferritin Panel     Vitamin B12     Iron, TIBC and Ferritin Panel        Discussed weight loss options, meds. Not interested in surgery. Hx of hernias.   Could consider GLP's.   Dr. Best in [facility] - dx her with pernicious anemia in her 20's.   On and off has been on B12, tabs & sublingual.   Return in about 6 months (around 2/21/2025) for Yrly Physical.  Patient may call or return to office with any questions or concerns.   ______________________________________________________________________  Subjective:     Patient ID: Elke Moya is a 38 y.o. female.  HPI  Elke Moya  Chief Complaint   Patient presents with   • Follow-up     Breasts have been painful for several months now.   Lactation from L breast happened on 7/23/24, only that day.   Breast still sore again after & now normal, no pain even while on menses now.     Wt Readings from Last 3 Encounters:   08/21/24 117 kg (258 lb)   02/21/24 115 kg (253 lb)   02/08/24 113 kg (250 lb)     Temp Readings from Last 3 Encounters:   08/24/23 98.8 °F (37.1 °C)   02/24/23 98.8 °F (37.1 °C)   01/14/23 99 °F (37.2 °C)     BP Readings from Last 3 Encounters:   08/21/24 130/84   02/21/24 142/100   02/08/24 130/80     Pulse Readings from Last 3 Encounters:   08/21/24 89   02/21/24 79   11/22/23 105     Depression Screening and Follow-up Plan: Patient with underlying depression and was advised to  continue current medications as prescribed. Pt stressors, more situational      The following portions of the patient's history were reviewed and updated as appropriate: allergies, current medications, past medical history, and problem list.    Review of Systems   Constitutional:  Positive for fatigue. Negative for appetite change, chills, diaphoresis, fever and unexpected weight change.   HENT:  Negative for congestion, ear pain and sore throat.         Some hair falling out at times, some dry skin at times   Eyes:  Negative for pain and redness.   Respiratory:  Negative for cough and shortness of breath.         MCGOWAN + random times, works out daily, and has good conditioning otherwise   Cardiovascular:  Negative for chest pain and palpitations.   Gastrointestinal:  Positive for diarrhea (occas, since GB out). Negative for nausea and vomiting.   Endocrine: Positive for polyuria (does drink a lot of water). Negative for polydipsia and polyphagia.   Neurological:  Positive for light-headedness (only if she skips a meal by accident). Negative for dizziness and headaches.       Objective:      Vitals:    08/21/24 0750   BP: 130/84   Pulse: 89   SpO2: 99%      Physical Exam  Vitals and nursing note reviewed.   Constitutional:       General: She is not in acute distress.     Appearance: Normal appearance. She is obese. She is not ill-appearing.   HENT:      Head: Normocephalic and atraumatic.   Eyes:      General: No scleral icterus.        Right eye: No discharge.         Left eye: No discharge.   Neck:      Comments: No thyroid nodules or thyromegaly.  Cardiovascular:      Rate and Rhythm: Normal rate and regular rhythm.      Pulses: Normal pulses.      Heart sounds: Normal heart sounds. No murmur heard.  Pulmonary:      Effort: Pulmonary effort is normal. No respiratory distress.      Breath sounds: Normal breath sounds. No stridor. No wheezing.   Musculoskeletal:      Cervical back: Normal range of motion and neck  "supple. No rigidity or tenderness.      Right lower leg: No edema.      Left lower leg: No edema.   Lymphadenopathy:      Cervical: No cervical adenopathy.   Neurological:      Mental Status: She is alert and oriented to person, place, and time.      Gait: Gait normal.   Psychiatric:         Mood and Affect: Mood normal.         Behavior: Behavior normal.         Thought Content: Thought content normal.         Judgment: Judgment normal.         I have spent a total time of 50 minutes in caring for this patient on the day of the visit/encounter including Risks and benefits of tx options, Instructions for management, Patient and family education, Impressions, and Documenting in the medical record.    Portions of the record may have been created with voice recognition software. Occasional wrong word or \"sound alike\" substitutions may have occurred due to the inherent limitations of voice recognition software. Please review the chart carefully and recognize, using context, where substitutions/typographical errors may have occurred.     "

## 2024-08-27 LAB
25(OH)D3 SERPL-MCNC: 35 NG/ML (ref 30–100)
FERRITIN SERPL-MCNC: 9 NG/ML (ref 16–154)
HBA1C MFR BLD: 5.6 % OF TOTAL HGB
IRON SATN MFR SERPL: 29 % (CALC) (ref 16–45)
IRON SERPL-MCNC: 114 MCG/DL (ref 40–190)
PROLACTIN SERPL-MCNC: 10.2 NG/ML
TIBC SERPL-MCNC: 387 MCG/DL (CALC) (ref 250–450)
VIT B12 SERPL-MCNC: 689 PG/ML (ref 200–1100)

## 2024-09-09 LAB — CORTIS SAL-MCNC: <0.03 MCG/DL

## 2024-10-03 DIAGNOSIS — E66.01 CLASS 3 SEVERE OBESITY DUE TO EXCESS CALORIES WITH SERIOUS COMORBIDITY AND BODY MASS INDEX (BMI) OF 40.0 TO 44.9 IN ADULT (HCC): Primary | ICD-10-CM

## 2024-10-03 DIAGNOSIS — E66.813 CLASS 3 SEVERE OBESITY DUE TO EXCESS CALORIES WITH SERIOUS COMORBIDITY AND BODY MASS INDEX (BMI) OF 40.0 TO 44.9 IN ADULT (HCC): Primary | ICD-10-CM

## 2024-10-03 RX ORDER — TIRZEPATIDE 2.5 MG/.5ML
2.5 INJECTION, SOLUTION SUBCUTANEOUS WEEKLY
Qty: 2 ML | Refills: 0 | Status: SHIPPED | OUTPATIENT
Start: 2024-10-03 | End: 2024-10-31

## 2024-11-25 ENCOUNTER — HOSPITAL ENCOUNTER (OUTPATIENT)
Dept: MAMMOGRAPHY | Facility: CLINIC | Age: 39
Discharge: HOME | End: 2024-11-25
Payer: COMMERCIAL

## 2024-11-25 ENCOUNTER — TELEPHONE (OUTPATIENT)
Dept: GYNECOLOGY | Facility: CLINIC | Age: 39
End: 2024-11-25

## 2024-11-25 VITALS — HEIGHT: 66 IN | WEIGHT: 258 LBS | BODY MASS INDEX: 41.46 KG/M2

## 2024-11-25 DIAGNOSIS — Z92.89 HISTORY OF MAMMOGRAM: ICD-10-CM

## 2024-11-25 PROCEDURE — 77067 SCR MAMMO BI INCL CAD: CPT

## 2024-11-25 PROCEDURE — 77063 BREAST TOMOSYNTHESIS BI: CPT

## 2024-11-25 NOTE — TELEPHONE ENCOUNTER
Left message for patient to call office and reschedule appointment, due to Dr. Riedel retiring.  My chart message sent.

## 2024-11-26 ENCOUNTER — RESULTS FOLLOW-UP (OUTPATIENT)
Dept: GYNECOLOGY | Facility: CLINIC | Age: 39
End: 2024-11-26

## 2024-11-26 DIAGNOSIS — R92.8 ABNORMAL MAMMOGRAM: Primary | ICD-10-CM

## 2024-12-02 ENCOUNTER — RESULTS FOLLOW-UP (OUTPATIENT)
Dept: FAMILY MEDICINE CLINIC | Facility: HOSPITAL | Age: 39
End: 2024-12-02

## 2024-12-18 ENCOUNTER — HOSPITAL ENCOUNTER (OUTPATIENT)
Dept: ULTRASOUND IMAGING | Facility: CLINIC | Age: 39
Discharge: HOME | End: 2024-12-18
Payer: COMMERCIAL

## 2024-12-18 DIAGNOSIS — R92.8 ABNORMAL MAMMOGRAM: ICD-10-CM

## 2024-12-18 PROCEDURE — 76642 ULTRASOUND BREAST LIMITED: CPT

## 2024-12-18 NOTE — PROGRESS NOTES
Met with patient and Dr. Puente  regarding recommendation for;      _____ RIGHT ___x___LEFT      __x___Ultrasound guided  ______Stereotactic  Breast biopsy.      __x___Verbalized understanding.      Blood thinners:  _____yes __x___no    Date stopped: ____x_______    Biopsy teaching sheet given:  ____x___yes ______no

## 2024-12-20 ENCOUNTER — RESULTS FOLLOW-UP (OUTPATIENT)
Dept: GYNECOLOGY | Facility: CLINIC | Age: 39
End: 2024-12-20

## 2024-12-20 ENCOUNTER — PATIENT MESSAGE (OUTPATIENT)
Dept: FAMILY MEDICINE CLINIC | Facility: HOSPITAL | Age: 39
End: 2024-12-20

## 2024-12-20 DIAGNOSIS — R92.8 ABNORMAL MAMMOGRAM: Primary | ICD-10-CM

## 2024-12-23 NOTE — PATIENT COMMUNICATION
"I spoke with patient and she would like to know PCP recommendation on who to see regarding her breast pain. She is very comfortable with PCP but, will go on if a specialist is best. She is having left breast discomfort and has an erect left nipple. She said the breast feels heavy sometimes (like when lactating). She also is having pain above breast  (upper chest) and upper left back discomfort. Note on chart by Dr. Sheriff(GYN) from 12/20/24:  \"Pt needs ultrasound bx of left breast and this was ordered and is scheduled for 1-8-25.  She will also need follow up US of the left breast in 6 months, this was ordered.\"  I did mention to her GYN was ordering doctor for [facility] but, she seems unfamiliar with doctor.  "

## 2024-12-30 ENCOUNTER — HOSPITAL ENCOUNTER (OUTPATIENT)
Dept: MAMMOGRAPHY | Facility: CLINIC | Age: 39
Discharge: HOME | End: 2024-12-30
Payer: COMMERCIAL

## 2024-12-30 ENCOUNTER — HOSPITAL ENCOUNTER (OUTPATIENT)
Dept: ULTRASOUND IMAGING | Facility: CLINIC | Age: 39
Discharge: HOME | End: 2024-12-30
Payer: COMMERCIAL

## 2024-12-30 VITALS — HEART RATE: 81 BPM | SYSTOLIC BLOOD PRESSURE: 123 MMHG | DIASTOLIC BLOOD PRESSURE: 83 MMHG

## 2024-12-30 DIAGNOSIS — R92.8 ABNORMAL ULTRASOUND OF BREAST: ICD-10-CM

## 2024-12-30 DIAGNOSIS — Z98.890 STATUS POST BIOPSY: ICD-10-CM

## 2024-12-30 PROCEDURE — 88305 TISSUE EXAM BY PATHOLOGIST: CPT | Performed by: PATHOLOGY

## 2024-12-30 PROCEDURE — 88341 IMHCHEM/IMCYTCHM EA ADD ANTB: CPT | Performed by: PATHOLOGY

## 2024-12-30 PROCEDURE — 19083 BX BREAST 1ST LESION US IMAG: CPT

## 2024-12-30 PROCEDURE — A4648 IMPLANTABLE TISSUE MARKER: HCPCS

## 2024-12-30 PROCEDURE — 88342 IMHCHEM/IMCYTCHM 1ST ANTB: CPT | Performed by: PATHOLOGY

## 2024-12-30 RX ORDER — LIDOCAINE HYDROCHLORIDE 10 MG/ML
5 INJECTION, SOLUTION EPIDURAL; INFILTRATION; INTRACAUDAL; PERINEURAL ONCE
Status: COMPLETED | OUTPATIENT
Start: 2024-12-30 | End: 2024-12-30

## 2024-12-30 RX ADMIN — LIDOCAINE HYDROCHLORIDE 5 ML: 10 INJECTION, SOLUTION EPIDURAL; INFILTRATION; INTRACAUDAL; PERINEURAL at 13:15

## 2024-12-30 NOTE — PROGRESS NOTES
Procedure type:    _x____ultrasound guided _____stereotactic    Breast:    __x___Left _____Right    Location: 3:00 14cmfn    Needle: 12g Mukund    # of passes: 4    Clip: Ribbon    Performed by: Dr. Eller    Pressure held for 5 minutes by: Ling Briggs Strips:    ___x__yes _____no    Band aid:    ____x_yes_____no    Tape and guaze:    _____yes __x___no    Tolerated procedure:    ___x__yes _____no

## 2024-12-31 NOTE — PROGRESS NOTES
Post procedure call completed    Bleeding: _____yes __X___no    Pain: _____yes ___X___no (soreness. Continuing to Ice today)    Redness/Swelling: ______yes ___X___no    Band aid removed: _____yes ___X__no (discussed removing when she showers)    Steri-Strips intact: ___X___yes _____no (discussed with patient to remove steri strips on Saturday if they have not come off on their own)    Pt with no questions at this time, adv will call when results available, adv to call with any questions or concerns, has name/# for contact

## 2025-01-03 ENCOUNTER — TELEPHONE (OUTPATIENT)
Dept: MAMMOGRAPHY | Facility: CLINIC | Age: 40
End: 2025-01-03

## 2025-01-03 PROCEDURE — 88341 IMHCHEM/IMCYTCHM EA ADD ANTB: CPT | Performed by: PATHOLOGY

## 2025-01-03 PROCEDURE — 88342 IMHCHEM/IMCYTCHM 1ST ANTB: CPT | Performed by: PATHOLOGY

## 2025-01-03 PROCEDURE — 88305 TISSUE EXAM BY PATHOLOGIST: CPT | Performed by: PATHOLOGY

## 2025-01-03 NOTE — TELEPHONE ENCOUNTER
Patient was given breast biopsy results and advised that she will receive a call with recommendation when Dr. Eller completes report. Patient verbalized understanding.

## 2025-01-06 ENCOUNTER — TELEPHONE (OUTPATIENT)
Dept: MAMMOGRAPHY | Facility: CLINIC | Age: 40
End: 2025-01-06

## 2025-01-08 ENCOUNTER — HOSPITAL ENCOUNTER (OUTPATIENT)
Dept: ULTRASOUND IMAGING | Facility: CLINIC | Age: 40
Discharge: HOME | End: 2025-01-08

## 2025-01-15 ENCOUNTER — RESULTS FOLLOW-UP (OUTPATIENT)
Dept: GYNECOLOGY | Facility: CLINIC | Age: 40
End: 2025-01-15

## 2025-01-15 DIAGNOSIS — R92.8 ABNORMAL MAMMOGRAM: Primary | ICD-10-CM

## 2025-01-20 ENCOUNTER — OFFICE VISIT (OUTPATIENT)
Dept: FAMILY MEDICINE CLINIC | Facility: HOSPITAL | Age: 40
End: 2025-01-20
Payer: COMMERCIAL

## 2025-01-20 ENCOUNTER — TELEPHONE (OUTPATIENT)
Age: 40
End: 2025-01-20

## 2025-01-20 VITALS
BODY MASS INDEX: 38.67 KG/M2 | WEIGHT: 239.6 LBS | SYSTOLIC BLOOD PRESSURE: 126 MMHG | HEART RATE: 110 BPM | DIASTOLIC BLOOD PRESSURE: 76 MMHG | OXYGEN SATURATION: 98 % | TEMPERATURE: 97.6 F

## 2025-01-20 DIAGNOSIS — J10.1 INFLUENZA A: ICD-10-CM

## 2025-01-20 DIAGNOSIS — Z20.828 EXPOSURE TO INFLUENZA: ICD-10-CM

## 2025-01-20 DIAGNOSIS — Z20.828 EXPOSURE TO RESPIRATORY SYNCYTIAL VIRUS (RSV): ICD-10-CM

## 2025-01-20 DIAGNOSIS — J10.1 INFLUENZA A: Primary | ICD-10-CM

## 2025-01-20 DIAGNOSIS — J45.990 EXERCISE-INDUCED ASTHMA: ICD-10-CM

## 2025-01-20 DIAGNOSIS — E27.1 PRIMARY ADRENOCORTICAL INSUFFICIENCY (HCC): ICD-10-CM

## 2025-01-20 DIAGNOSIS — D84.9 IMMUNE DEFICIENCY DISORDER (HCC): ICD-10-CM

## 2025-01-20 PROCEDURE — 99214 OFFICE O/P EST MOD 30 MIN: CPT | Performed by: FAMILY MEDICINE

## 2025-01-20 RX ORDER — OSELTAMIVIR PHOSPHATE 75 MG/1
75 CAPSULE ORAL 2 TIMES DAILY
Qty: 10 CAPSULE | Refills: 0 | Status: SHIPPED | OUTPATIENT
Start: 2025-01-20 | End: 2025-01-25

## 2025-01-20 RX ORDER — OSELTAMIVIR PHOSPHATE 75 MG/1
75 CAPSULE ORAL DAILY
Qty: 5 CAPSULE | Refills: 0 | Status: SHIPPED | OUTPATIENT
Start: 2025-01-20 | End: 2025-01-20 | Stop reason: SDUPTHER

## 2025-01-20 NOTE — TELEPHONE ENCOUNTER
Dr Soto out of the office for the remainder of the day. Is Dr Sandoval able to advise on this? Thank you

## 2025-01-20 NOTE — TELEPHONE ENCOUNTER
Lew from [facility] pharmacy called back. Checked with office staff and confirmed updated prescription for tamiflu was just sent over

## 2025-01-20 NOTE — TELEPHONE ENCOUNTER
[facility] pharmacy called to check on the dosage of the Tamiflu that doctor Charles sent over today to fill for the patient. The [facility] pharmacist informed us that usually they are done 10 at a time but only 5 pills were requested. He wanted to know if they could resend it with either doing 10 pills once a day or would they like the patient to do 2 pills for five days. Can someone please follow up with [facility] pharmacy in regards to this thank you

## 2025-01-20 NOTE — ASSESSMENT & PLAN NOTE
Currently stable.  Due to underlying influenza she restarted the Flovent.  Doing well.  Continue with Flovent for now.  Albuterol as needed as needed.

## 2025-01-20 NOTE — PROGRESS NOTES
Name: Elke Moya      : 1985      MRN: [id number]  Encounter Provider: Harris Talbot MD  Encounter Date: 2025   Encounter department: [facility] CARE [address]   :  Assessment & Plan  Influenza A  Patient with influenza A.  She is in the window to use Tamiflu.  Discussed pros and cons of using Tamiflu.  Reviewed side effects and adverse reaction.    Continue with supportive treatment.  Orders:    oseltamivir (TAMIFLU) 75 mg capsule; Take 1 capsule (75 mg total) by mouth daily for 5 days    Exercise-induced asthma  Currently stable.  Due to underlying influenza she restarted the Flovent.  Doing well.  Continue with Flovent for now.  Albuterol as needed as needed.       Exposure to influenza         Exposure to respiratory syncytial virus (RSV)         Primary adrenocortical insufficiency (HCC)  Stable.  Monitor.  Continue follow-up with primary care physician.       Immune deficiency disorder (HCC)  Stable.  Continue current regimen.  Continue follow-up with immunologist.              History of Present Illness   About 2-1/2 days ago she started with low-grade fever, myalgias, overall feeling sick.  Nasal congestion, PND, ear pain, sore throat.  Intermittent wheezing but no significant shortness of breath.  Some coughing.  Known exercise-induced asthma.  With underlying immunodeficiency syndrome.  Her daughter has influenza A.      Review of Systems    Objective   /76 (Patient Position: Sitting, Cuff Size: Standard)   Pulse (!) 110   Temp 97.6 °F (36.4 °C) (Tympanic)   Wt 109 kg (239 lb 9.6 oz)   SpO2 98%   BMI 38.67 kg/m²      Physical Exam  Vitals and nursing note reviewed.   Constitutional:       General: She is not in acute distress.     Appearance: Normal appearance. She is well-developed. She is ill-appearing.   HENT:      Head: Normocephalic and atraumatic.      Right Ear: External ear normal.      Left Ear: External ear normal.      Nose: Nose normal.       Mouth/Throat:      Mouth: Mucous membranes are moist.   Eyes:      Conjunctiva/sclera: Conjunctivae normal.      Pupils: Pupils are equal, round, and reactive to light.   Cardiovascular:      Rate and Rhythm: Normal rate and regular rhythm.      Heart sounds: Normal heart sounds.   Pulmonary:      Effort: Pulmonary effort is normal.      Breath sounds: Normal breath sounds.   Abdominal:      General: Bowel sounds are normal.      Palpations: Abdomen is soft.   Musculoskeletal:      Cervical back: Normal range of motion and neck supple.   Skin:     General: Skin is warm and dry.   Neurological:      General: No focal deficit present.      Mental Status: She is alert and oriented to person, place, and time.   Psychiatric:         Mood and Affect: Mood normal.         Behavior: Behavior normal.

## 2025-01-24 ENCOUNTER — OFFICE VISIT (OUTPATIENT)
Dept: FAMILY MEDICINE CLINIC | Facility: HOSPITAL | Age: 40
End: 2025-01-24
Payer: COMMERCIAL

## 2025-01-24 ENCOUNTER — HOSPITAL ENCOUNTER (OUTPATIENT)
Dept: RADIOLOGY | Facility: HOSPITAL | Age: 40
End: 2025-01-24
Payer: COMMERCIAL

## 2025-01-24 VITALS
BODY MASS INDEX: 38.73 KG/M2 | HEIGHT: 66 IN | TEMPERATURE: 98.6 F | WEIGHT: 241 LBS | HEART RATE: 100 BPM | SYSTOLIC BLOOD PRESSURE: 126 MMHG | OXYGEN SATURATION: 99 % | DIASTOLIC BLOOD PRESSURE: 88 MMHG

## 2025-01-24 DIAGNOSIS — J40 BRONCHITIS: ICD-10-CM

## 2025-01-24 DIAGNOSIS — J40 BRONCHITIS: Primary | ICD-10-CM

## 2025-01-24 DIAGNOSIS — E66.9 OBESITY (BMI 30-39.9): ICD-10-CM

## 2025-01-24 PROCEDURE — 71046 X-RAY EXAM CHEST 2 VIEWS: CPT

## 2025-01-24 PROCEDURE — 99214 OFFICE O/P EST MOD 30 MIN: CPT

## 2025-01-24 RX ORDER — METHYLPREDNISOLONE 4 MG/1
TABLET ORAL
Qty: 21 EACH | Refills: 0 | Status: SHIPPED | OUTPATIENT
Start: 2025-01-24

## 2025-01-24 RX ORDER — SEMAGLUTIDE 0.5 MG/.5ML
INJECTION, SOLUTION SUBCUTANEOUS
Qty: 2 ML | Refills: 0 | Status: SHIPPED | OUTPATIENT
Start: 2025-01-24

## 2025-01-24 RX ORDER — SEMAGLUTIDE 0.25 MG/.5ML
INJECTION, SOLUTION SUBCUTANEOUS
Qty: 2 ML | Refills: 0 | Status: SHIPPED | OUTPATIENT
Start: 2025-01-24 | End: 2025-01-24 | Stop reason: DRUGHIGH

## 2025-01-24 NOTE — PROGRESS NOTES
Name: Elke Moya      : 1985      MRN: [id number]  Encounter Provider: Elvin Moran MD  Encounter Date: 2025   Encounter department: [facility] PRIMARY CARE [address]  :  Assessment & Plan  Bronchitis  Wheezing noted on exam, concern for asthma exacerbation secondary to likely viral URI, recently diagnosed with influenza, continue Tamiflu course, of note daughter is being treated for RSV.  Will treat symptomatically with orders below.  Advised to go to the emergency room for acute shortness of breath.  Due to immunodeficiency, will get stat chest x-ray to exclude pneumonia.  Advised to let us know immediately if she develops a temperature of 100.3 or other signs of a bacterial infection.    Orders:    methylPREDNISolone 4 MG tablet therapy pack; Use as directed on package    XR chest pa and lateral; Future    dextromethorphan-guaifenesin (MUCINEX DM)  MG per 12 hr tablet; Take 1 tablet by mouth every 12 (twelve) hours    Semaglutide-Weight Management (Wegovy) 0.5 MG/0.5ML; Inject 0.5 mg under the skin weekly    Obesity (BMI 30-39.9)      Patient has been getting compounded semaglutide through her [employer] subscription and is currently dose at 0.5.  Medication appears to be preferred under her plan, prescription sent.  Counseled on lifestyle modifications including diet and exercise    Orders:    Semaglutide-Weight Management (Wegovy) 0.5 MG/0.5ML; Inject 0.5 mg under the skin weekly           History of Present Illness   HPI  Patient recently diagnosed with influenza 4 days ago and on Tamiflu is concerned that she is not getting better, reports productive cough.  She reports having a immune deficiency which puts her at increased risk of infection.  She denies any pain or pressure over her sinuses or ears.  She does report exacerbation of her known asthma with wheezing and some shortness of breath       Review of Systems    Objective   /88   Pulse 100   Temp 98.6 °F (37  "°C)   Ht 5' 6\" (1.676 m)   Wt 109 kg (241 lb)   SpO2 99%   BMI 38.90 kg/m²      Physical Exam  Constitutional:       General: She is not in acute distress.     Appearance: Normal appearance. She is not ill-appearing, toxic-appearing or diaphoretic.   HENT:      Head: Normocephalic.      Right Ear: Tympanic membrane, ear canal and external ear normal. There is no impacted cerumen.      Left Ear: Tympanic membrane, ear canal and external ear normal. There is no impacted cerumen.      Mouth/Throat:      Mouth: Mucous membranes are moist.      Pharynx: Oropharynx is clear.   Eyes:      Conjunctiva/sclera: Conjunctivae normal.   Cardiovascular:      Rate and Rhythm: Normal rate and regular rhythm.      Heart sounds: Normal heart sounds. No murmur heard.  Pulmonary:      Effort: Pulmonary effort is normal. No respiratory distress.      Breath sounds: No stridor. Wheezing present. No rhonchi or rales.   Neurological:      Mental Status: She is alert and oriented to person, place, and time.   Psychiatric:         Mood and Affect: Mood normal.         Behavior: Behavior normal.         "

## 2025-03-21 ENCOUNTER — TELEPHONE (OUTPATIENT)
Dept: FAMILY MEDICINE CLINIC | Facility: HOSPITAL | Age: 40
End: 2025-03-21

## 2025-03-21 NOTE — TELEPHONE ENCOUNTER
PA for (Wegovy) 0.5 MG/0.5ML EXCLUDED from plan       Reason:(Screenshot if applicable)        Message sent to office clinical pool Yes

## 2025-03-21 NOTE — TELEPHONE ENCOUNTER
PA for (Wegovy) 0.5 MG/0.5ML SUBMITTED to [employer]    via    [x]CMM-KEY: [id number]  []Surescripts-Case ID #   []Availity-Auth ID # NDC #   []Faxed to plan   []Other website   []Phone call Case ID #     [x]PA sent as URGENT    All office notes, labs and other pertaining documents and studies sent. Clinical questions answered. Awaiting determination from insurance company.     Turnaround time for your insurance to make a decision on your Prior Authorization can take 7-21 business days.

## 2025-03-21 NOTE — TELEPHONE ENCOUNTER
Reason for call:   [x] Prior Auth  [] Other:     Caller:  [x] Patient  [] Pharmacy [facility] - [place] - [address] [facility]. [phone]     Medication Zepbound 2.5mg    Dose/Frequency: inject weekly    Quantity: 2ml    Ordering Provider:   [x] PCP/Provider - Jaime  [] Speciality/Provider -     Has the patient tried other medications and failed? If failed, which medications did they fail?    [] No   [x] Yes - Wegovy    Is the patient's insurance updated in EPIC?   [x] Yes   [] No     Is a copy of the patient's insurance scanned in EPIC?   [x] Yes   [] No

## 2025-03-21 NOTE — TELEPHONE ENCOUNTER
I do not see Zepbound ordered in patients chart. I do see Wegovy. Is this suppose to be for Wegovy? If so, there is a note patient receives this via [employer].

## 2025-04-02 ENCOUNTER — OFFICE VISIT (OUTPATIENT)
Dept: FAMILY MEDICINE CLINIC | Facility: HOSPITAL | Age: 40
End: 2025-04-02
Payer: COMMERCIAL

## 2025-04-02 VITALS
OXYGEN SATURATION: 99 % | BODY MASS INDEX: 37.28 KG/M2 | HEART RATE: 77 BPM | SYSTOLIC BLOOD PRESSURE: 138 MMHG | HEIGHT: 66 IN | WEIGHT: 232 LBS | DIASTOLIC BLOOD PRESSURE: 84 MMHG

## 2025-04-02 DIAGNOSIS — Z86.2 HISTORY OF PERNICIOUS ANEMIA: ICD-10-CM

## 2025-04-02 DIAGNOSIS — F41.1 GENERALIZED ANXIETY DISORDER: ICD-10-CM

## 2025-04-02 DIAGNOSIS — E27.1 PRIMARY ADRENOCORTICAL INSUFFICIENCY (HCC): ICD-10-CM

## 2025-04-02 DIAGNOSIS — E66.09 CLASS 2 OBESITY DUE TO EXCESS CALORIES WITHOUT SERIOUS COMORBIDITY WITH BODY MASS INDEX (BMI) OF 37.0 TO 37.9 IN ADULT: ICD-10-CM

## 2025-04-02 DIAGNOSIS — Z00.00 ANNUAL PHYSICAL EXAM: Primary | ICD-10-CM

## 2025-04-02 DIAGNOSIS — E66.812 CLASS 2 OBESITY DUE TO EXCESS CALORIES WITHOUT SERIOUS COMORBIDITY WITH BODY MASS INDEX (BMI) OF 37.0 TO 37.9 IN ADULT: ICD-10-CM

## 2025-04-02 PROCEDURE — 99396 PREV VISIT EST AGE 40-64: CPT | Performed by: STUDENT IN AN ORGANIZED HEALTH CARE EDUCATION/TRAINING PROGRAM

## 2025-04-02 RX ORDER — FERROUS SULFATE 325(65) MG
325 TABLET, DELAYED RELEASE (ENTERIC COATED) ORAL
COMMUNITY

## 2025-04-02 RX ORDER — SEMAGLUTIDE 1 MG/.5ML
1 INJECTION, SOLUTION SUBCUTANEOUS
COMMUNITY
Start: 2025-02-14

## 2025-04-02 NOTE — ASSESSMENT & PLAN NOTE
Check levels this summer.   Orders:  •  CBC and differential; Future  •  Iron, TIBC and Ferritin Panel; Future  •  Vitamin B12; Future

## 2025-04-02 NOTE — PROGRESS NOTES
"Adult Annual Physical  Name: Elke Moya      : 1985      MRN: [id number]  Encounter Provider: Emma Sandoval DO  Encounter Date: 2025   Encounter department: [facility] PRIMARY CARE [address]    Assessment & Plan  Annual physical exam  Topics as below.        Class 2 obesity due to excess calories without serious comorbidity with body mass index (BMI) of 37.0 to 37.9 in adult  \"Patient has been getting compounded semaglutide through her [employer] subscription and is currently dose at 0.5.  Medication appears to be preferred under her plan, prescription sent. \"    Using [employer] ordered wegovy option.     Orders:  •  Comprehensive metabolic panel; Future  •  Lipid Panel with Direct LDL reflex; Future  •  Hemoglobin A1C W/EAG With Reflex To Glycomark(R); Future    Generalized anxiety disorder  Not on meds, feels stable mostly.   Dealing with work stress & some newer family developments.        Primary adrenocortical insufficiency (HCC)  Seen in the past by luke. Chronic no issue right now       History of pernicious anemia  Check levels this summer.   Orders:  •  CBC and differential; Future  •  Iron, TIBC and Ferritin Panel; Future  •  Vitamin B12; Future    Preventive Screenings:  - Diabetes Screening: screening up-to-date and orders placed  - Cholesterol Screening: orders placed   - Hepatitis C screening: screening up-to-date   - HIV screening: screening up-to-date   - Cervical cancer screening: screening up-to-date   - Breast cancer screening: screening up-to-date   - Colon cancer screening: screening not indicated   - Lung cancer screening: screening not indicated     Immunizations:  - Immunizations due: Prevnar 20, Tdap and Tdap UTD - done in pregnancy, daughter 5 yo  - Risks/benefits immunizations discussed    - Immunizations given per orders    - The patient declines recommended vaccines currently despite my recommendations      Counseling/Anticipatory Guidance:  - Alcohol: discussed " moderation in alcohol intake and recommendations for healthy alcohol use.   - Drug use: discussed harms of illicit drug use and how it can negatively impact mental/physical health.   - Tobacco use: discussed harms of tobacco use and management options for quitting.   - Dental health: discussed importance of regular tooth brushing, flossing, and dental visits.   - Sexual health: discussed sexually transmitted diseases, partner selection, use of condoms, avoidance of unintended pregnancy, and contraceptive alternatives.   - Diet: discussed recommendations for a healthy/well-balanced diet.   - Exercise: the importance of regular exercise/physical activity was discussed. Recommend exercise 3-5 times per week for at least 30 minutes.   - Injury prevention: discussed safety/seat belts, safety helmets, smoke detectors, carbon monoxide detectors, and smoking near bedding or upholstery.          History of Present Illness     Adult Annual Physical:  Patient presents for annual physical.     Diet and Physical Activity:  - Diet/Nutrition: well balanced diet, portion control, consuming 3-5 servings of fruits/vegetables daily, adequate fiber intake and adequate whole grain intake. drinking tons of water  - Exercise: moderate cardiovascular exercise, 5-7 times a week on average and strength training exercises. pool, strength, cardio    Depression Screening:  - PHQ-2 Score: 0    General Health:  - Sleep: 4-6 hours of sleep on average and 7-8 hours of sleep on average. Having some trouble sleeping but I do often in the spring, gets stuffy  - Hearing: normal hearing right ear and normal hearing left ear.  - Vision: most recent eye exam > 1 year ago and wears glasses.  - Dental: regular dental visits and brushes teeth twice daily.    /GYN Health:  - Follows with GYN: yes.   - History of STDs: no  - Contraception: male partner had vasectomy.      Advanced Care Planning:  - Has an advanced directive?: yes    - Has a durable medical  POA?: no      Wt Readings from Last 3 Encounters:   04/02/25 105 kg (232 lb)   01/24/25 109 kg (241 lb)   01/20/25 109 kg (239 lb 9.6 oz)     Temp Readings from Last 3 Encounters:   01/24/25 98.6 °F (37 °C)   01/20/25 97.6 °F (36.4 °C) (Tympanic)   08/24/23 98.8 °F (37.1 °C)     BP Readings from Last 3 Encounters:   04/02/25 138/84   01/24/25 126/88   01/20/25 126/76     Pulse Readings from Last 3 Encounters:   04/02/25 77   01/24/25 100   01/20/25 (!) 110     Review of Systems   Constitutional:  Negative for chills and fever.   Respiratory:  Negative for cough and shortness of breath.    Cardiovascular:  Negative for chest pain and palpitations.        Some breast tenderness chronic mild    Gastrointestinal:  Positive for constipation (some on meds, on and off). Negative for diarrhea.   Genitourinary:  Negative for dysuria and pelvic pain.   Neurological:  Negative for dizziness, light-headedness and headaches.     Current Outpatient Medications on File Prior to Visit   Medication Sig Dispense Refill   • albuterol (Ventolin HFA) 90 mcg/act inhaler Inhale 1-2 puffs every 6 (six) hours as needed for wheezing or shortness of breath 18 g 2   • azithromycin (ZITHROMAX) 250 mg tablet Take 250 mg by mouth 2 (two) times a week     • Cholecalciferol (Vitamin D) 50 MCG (2000 UT) tablet Take 1 tablet (2,000 Units total) by mouth daily 30 tablet 0   • ELDERBERRY PO Take by mouth     • ferrous sulfate 325 (65 FE) MG EC tablet Take 325 mg by mouth daily with breakfast     • fluticasone (Flovent HFA) 44 mcg/act inhaler Inhale 2 puffs 2 (two) times a day Rinse mouth after use. (Patient taking differently: Inhale 2 puffs 2 (two) times a day Rinse mouth after use.    PRN) 10.6 g 1   • Fluticasone Propionate (FLONASE ALLERGY RELIEF NA) 1 to 2 sprays daily prn     • Multiple Vitamin (MULTIVITAMIN) tablet Take 1 tablet by mouth daily     • Multiple Vitamins-Minerals (Hair Skin and Nails Formula) TABS      • omeprazole (PriLOSEC) 20 mg  "delayed release capsule Take 20 mg by mouth daily     • Probiotic Product (PROBIOTIC BLEND PO) Take by mouth     • Semaglutide-Weight Management (Wegovy) 1 MG/0.5ML Inject 1 mg under the skin     • [DISCONTINUED] dextromethorphan-guaifenesin (MUCINEX DM)  MG per 12 hr tablet Take 1 tablet by mouth every 12 (twelve) hours 60 tablet 0   • [DISCONTINUED] methylPREDNISolone 4 MG tablet therapy pack Use as directed on package 21 each 0   • [DISCONTINUED] Semaglutide-Weight Management (Wegovy) 0.5 MG/0.5ML Inject 0.5 mg under the skin weekly 2 mL 0     No current facility-administered medications on file prior to visit.      Social History     Tobacco Use   • Smoking status: Never   • Smokeless tobacco: Never   Vaping Use   • Vaping status: Never Used   Substance and Sexual Activity   • Alcohol use: Yes     Alcohol/week: 1.0 standard drink of alcohol     Types: 1 Glasses of wine per week     Comment: Was drinking up to/ aprox 3 drinks a week prior to april   • Drug use: Never   • Sexual activity: Yes     Partners: Male     Birth control/protection: I.U.D.     Objective   /84   Pulse 77   Ht 5' 6\" (1.676 m)   Wt 105 kg (232 lb)   SpO2 99%   BMI 37.45 kg/m²     Physical Exam  Vitals and nursing note reviewed.   Constitutional:       General: She is not in acute distress.     Appearance: Normal appearance. She is well-developed. She is obese. She is not ill-appearing.   HENT:      Head: Normocephalic and atraumatic.      Right Ear: Tympanic membrane, ear canal and external ear normal. There is no impacted cerumen.      Left Ear: Tympanic membrane, ear canal and external ear normal. There is no impacted cerumen.      Nose: Nose normal. No congestion or rhinorrhea.      Mouth/Throat:      Mouth: Mucous membranes are moist.      Pharynx: Oropharynx is clear. No oropharyngeal exudate or posterior oropharyngeal erythema.   Eyes:      General: No scleral icterus.        Right eye: No discharge.         Left eye: " No discharge.      Conjunctiva/sclera: Conjunctivae normal.   Neck:      Comments: No thyroid nodules or thyromegaly.  Cardiovascular:      Rate and Rhythm: Normal rate and regular rhythm.      Pulses: Normal pulses.      Heart sounds: Normal heart sounds. No murmur heard.  Pulmonary:      Effort: Pulmonary effort is normal. No respiratory distress.      Breath sounds: Normal breath sounds. No wheezing.   Musculoskeletal:         General: Normal range of motion.      Cervical back: Normal range of motion and neck supple. No rigidity or tenderness.      Right lower leg: No edema.      Left lower leg: No edema.   Lymphadenopathy:      Cervical: No cervical adenopathy.   Skin:     General: Skin is warm and dry.      Capillary Refill: Capillary refill takes less than 2 seconds.      Findings: No erythema or rash.   Neurological:      Mental Status: She is alert and oriented to person, place, and time.      Gait: Gait normal.   Psychiatric:         Mood and Affect: Mood normal.         Behavior: Behavior normal.         Thought Content: Thought content normal.         Judgment: Judgment normal.

## 2025-04-02 NOTE — ASSESSMENT & PLAN NOTE
Not on meds, feels stable mostly.   Dealing with work stress & some newer family developments.

## 2025-08-11 ENCOUNTER — HOSPITAL ENCOUNTER (OUTPATIENT)
Dept: ULTRASOUND IMAGING | Facility: CLINIC | Age: 40
Discharge: HOME | End: 2025-08-11
Attending: OBSTETRICS & GYNECOLOGY
Payer: COMMERCIAL

## (undated) DEVICE — Device: Brand: OMNICLOSE TROCAR SITE CLOSURE DEVICE

## (undated) DEVICE — LIGACLIP MCA MULTIPLE CLIP APPLIERS, 20 MEDIUM CLIPS: Brand: LIGACLIP

## (undated) DEVICE — PENCIL ELECTROSURG E-Z CLEAN -0035H

## (undated) DEVICE — INTENDED FOR TISSUE SEPARATION, AND OTHER PROCEDURES THAT REQUIRE A SHARP SURGICAL BLADE TO PUNCTURE OR CUT.: Brand: BARD-PARKER SAFETY BLADES SIZE 11, STERILE

## (undated) DEVICE — NEEDLE 25G X 1 1/2

## (undated) DEVICE — PAD GROUNDING ADULT

## (undated) DEVICE — COTTON BALLS: Brand: DEROYAL

## (undated) DEVICE — BETHLEHEM UNIVERSAL MINOR GEN: Brand: CARDINAL HEALTH

## (undated) DEVICE — TROCAR: Brand: KII® SLEEVE

## (undated) DEVICE — INTENDED FOR TISSUE SEPARATION, AND OTHER PROCEDURES THAT REQUIRE A SHARP SURGICAL BLADE TO PUNCTURE OR CUT.: Brand: BARD-PARKER SAFETY BLADES SIZE 15, STERILE

## (undated) DEVICE — SUT VICRYL 2-0 REEL 54 IN J286G

## (undated) DEVICE — TISSUE RETRIEVAL SYSTEM: Brand: INZII RETRIEVAL SYSTEM

## (undated) DEVICE — PACK PBDS LAP CHOLE RF

## (undated) DEVICE — 3M™ TEGADERM™ TRANSPARENT FILM DRESSING FRAME STYLE, 1624W, 2-3/8 IN X 2-3/4 IN (6 CM X 7 CM), 100/CT 4CT/CASE: Brand: 3M™ TEGADERM™

## (undated) DEVICE — 3000CC GUARDIAN II: Brand: GUARDIAN

## (undated) DEVICE — ENDOPATH PNEUMONEEDLE INSUFFLATION NEEDLES WITH LUER LOCK CONNECTORS 120MM: Brand: ENDOPATH

## (undated) DEVICE — POOLE SUCTION HANDLE: Brand: CARDINAL HEALTH

## (undated) DEVICE — GLOVE SRG BIOGEL ECLIPSE 7.5

## (undated) DEVICE — ELECTRODE LAP J HOOK E-Z CLEAN 33CM-0021

## (undated) DEVICE — ABDOMINAL PAD: Brand: DERMACEA

## (undated) DEVICE — TROCAR: Brand: KII FIOS FIRST ENTRY

## (undated) DEVICE — PLUMEPEN PRO 10FT

## (undated) DEVICE — ADHESIVE SKIN HIGH VISCOSITY EXOFIN 1ML

## (undated) DEVICE — NEEDLE 25GA X 1 IN SAFETY GLIDE

## (undated) DEVICE — BETHLEHEM MAJOR GENERAL PACK: Brand: CARDINAL HEALTH

## (undated) DEVICE — ANTIBACTERIAL UNDYED BRAIDED (POLYGLACTIN 910), SYNTHETIC ABSORBABLE SUTURE: Brand: COATED VICRYL

## (undated) DEVICE — SUT MONOCRYL 4-0 PS-2 18 IN Y496G

## (undated) DEVICE — SUT ETHIBOND 0 SH 30 IN X834H

## (undated) DEVICE — TRAY FOLEY 16FR URIMETER SURESTEP

## (undated) DEVICE — CHLORAPREP HI-LITE 26ML ORANGE

## (undated) DEVICE — SCD SEQUENTIAL COMPRESSION COMFORT SLEEVE LARGE KNEE LENGTH: Brand: KENDALL SCD

## (undated) DEVICE — SUT VICRYL 3-0 REEL 54 IN J285G

## (undated) DEVICE — SUT MONOCRYL 4-0 PS-2 27 IN Y426H

## (undated) DEVICE — SCD SEQUENTIAL COMPRESSION COMFORT SLEEVE MEDIUM KNEE LENGTH: Brand: KENDALL SCD

## (undated) DEVICE — LIGAMAX 5 MM ENDOSCOPIC MULTIPLE CLIP APPLIER: Brand: LIGAMAX

## (undated) DEVICE — ALL PURPOSE SPONGES,NONWOVEN, 4 PLY: Brand: CURITY

## (undated) DEVICE — TUBING SMOKE EVAC W/FILTRATION DEVICE PLUMEPORT ACTIV